# Patient Record
Sex: FEMALE | Race: WHITE | NOT HISPANIC OR LATINO | Employment: FULL TIME | URBAN - NONMETROPOLITAN AREA
[De-identification: names, ages, dates, MRNs, and addresses within clinical notes are randomized per-mention and may not be internally consistent; named-entity substitution may affect disease eponyms.]

---

## 2017-12-21 ENCOUNTER — OFFICE VISIT (OUTPATIENT)
Dept: ENDOCRINOLOGY | Facility: CLINIC | Age: 32
End: 2017-12-21

## 2017-12-21 VITALS
DIASTOLIC BLOOD PRESSURE: 60 MMHG | HEART RATE: 82 BPM | HEIGHT: 63 IN | SYSTOLIC BLOOD PRESSURE: 124 MMHG | WEIGHT: 219 LBS | BODY MASS INDEX: 38.8 KG/M2

## 2017-12-21 DIAGNOSIS — IMO0002 DIABETES MELLITUS TYPE 1, UNCONTROLLED, WITH COMPLICATIONS: Primary | ICD-10-CM

## 2017-12-21 DIAGNOSIS — E55.9 VITAMIN D DEFICIENCY: ICD-10-CM

## 2017-12-21 DIAGNOSIS — E66.9 OBESITY (BMI 30-39.9): ICD-10-CM

## 2017-12-21 PROCEDURE — 99214 OFFICE O/P EST MOD 30 MIN: CPT | Performed by: NURSE PRACTITIONER

## 2017-12-21 NOTE — PROGRESS NOTES
Subjective    Carey Medina is a 32 y.o. female. she is here today for follow-up.    History of Present Illness       Duration/Timing:Diabetes mellitus type 1, Age at onset of diabetes : 21 years, Onset of symptoms gradual        Timing - constant        Quality - controlled since pump        Severity - moderate      alleviating factors - intelligence       Aggravating factors - none            Severity (Complications/Hospitalizations)  Secondary Macrovascular Complications:  No CAD, No CVA, No PAD  Secondary Microvascular Complications:  No Diabetic Nephropathy, No Diabetic Retinopathy, No Diabetic Neuropathy        Context  Diabetes Regimen:Insulin, Not on Oral Medications, Compliant with regimen, last hgbA1c 7.1 % Oct. 2016   Blood Glucose Readings      Medtronic  insulin pump    Did not bring pump to office visit          Exercise:  Exercises        Associated Signs/Symptoms  Hyperglycemic Symptoms:  No polyuria, No polydipsia, No polyphagia  Hypoglycemic Episodes:No documented hypoglycemia          The following portions of the patient's history were reviewed and updated as appropriate:   Past Medical History:   Diagnosis Date   • Female infertility    • Irregular periods    • Obesity    • Oligomenorrhea    • Polycystic ovaries    • Proteinuria    • Type 1 diabetes mellitus    • Upper respiratory infection    • Visit for gynecologic examination 01/30/2015   • Vitamin D deficiency      Past Surgical History:   Procedure Laterality Date   • LAPAROSCOPY FOR ECTOPIC PREGNANCY       Family History   Problem Relation Age of Onset   • Diabetes Mother    • Diabetes Maternal Aunt    • Hypertension Maternal Grandmother    • Diabetes Maternal Grandmother    • Osteoporosis Other      OB History     No data available        Current Outpatient Prescriptions   Medication Sig Dispense Refill   • BD ULTRA-FINE LANCETS lancets by Other route 4 (four) times a day. 33 gauge. Use as instructed     • glucagon (GLUCAGON  "EMERGENCY) 1 MG injection Inject 1 mg into the shoulder, thigh, or buttocks 1 (One) Time As Needed for low blood sugar (use for low blood sugar) for up to 1 dose. 1 kit 11   • glucose blood (CHICO CONTOUR NEXT TEST) test strip Use as instructed - test 5 times daily 450 each 11   • insulin aspart (novoLOG) 100 UNIT/ML injection 120 units Inject through insulin pump 5 each 11   • Insulin Pen Needle (PEN NEEDLES 5/16\") 31G X 8 MM misc        No current facility-administered medications for this visit.      Allergies   Allergen Reactions   • Percocet [Oxycodone-Acetaminophen]      Social History     Social History   • Marital status:      Spouse name: N/A   • Number of children: N/A   • Years of education: N/A     Social History Main Topics   • Smoking status: Never Smoker   • Smokeless tobacco: Never Used   • Alcohol use No   • Drug use: No   • Sexual activity: Yes     Partners: Male      Comment:  to Rajesh 8 years     Other Topics Concern   • None     Social History Narrative       Review of Systems  Review of Systems   Constitutional: Negative for activity change, appetite change, diaphoresis and fatigue.   HENT: Negative for congestion, dental problem, facial swelling, sneezing, sore throat, tinnitus, trouble swallowing and voice change.    Eyes: Negative for photophobia, pain, discharge, redness, itching and visual disturbance.   Respiratory: Negative for apnea, cough, choking, chest tightness and shortness of breath.    Cardiovascular: Negative for chest pain, palpitations and leg swelling.   Gastrointestinal: Negative for abdominal distention, abdominal pain, constipation, diarrhea, nausea and vomiting.   Endocrine: Negative for cold intolerance, heat intolerance, polydipsia, polyphagia and polyuria.   Genitourinary: Negative for difficulty urinating, dysuria, frequency, hematuria and urgency.   Musculoskeletal: Negative for arthralgias, back pain, gait problem, joint swelling, myalgias, neck pain " "and neck stiffness.   Skin: Negative for color change, pallor, rash and wound.   Allergic/Immunologic: Negative for environmental allergies.   Neurological: Negative for dizziness, tremors, facial asymmetry, weakness, light-headedness, numbness and headaches.   Hematological: Negative for adenopathy. Does not bruise/bleed easily.   Psychiatric/Behavioral: Negative for agitation, behavioral problems, confusion and sleep disturbance.        Objective    /60 (BP Location: Left arm, Patient Position: Sitting, Cuff Size: Adult)  Pulse 82  Ht 160 cm (63\")  Wt 99.3 kg (219 lb)  BMI 38.79 kg/m2  Physical Exam   Constitutional: She is oriented to person, place, and time. She appears well-developed and well-nourished. No distress.   HENT:   Head: Normocephalic and atraumatic.   Right Ear: External ear normal.   Left Ear: External ear normal.   Nose: Nose normal.   Eyes: Conjunctivae and EOM are normal. Pupils are equal, round, and reactive to light.   Neck: Normal range of motion. Neck supple. No tracheal deviation present. No thyromegaly present.   Cardiovascular: Normal rate, regular rhythm and normal heart sounds.    No murmur heard.  Pulmonary/Chest: Effort normal and breath sounds normal. No respiratory distress. She has no wheezes.   Abdominal: Soft. Bowel sounds are normal. There is no tenderness. There is no rebound and no guarding.   Musculoskeletal: Normal range of motion. She exhibits no edema, tenderness or deformity.   Neurological: She is alert and oriented to person, place, and time. No cranial nerve deficit.   Skin: Skin is warm and dry. No rash noted.   Psychiatric: She has a normal mood and affect. Her behavior is normal. Judgment and thought content normal.       Lab Review  Glucose (mg/dl)   Date Value   04/16/2015 211 (H)   11/04/2014 160 (H)     Sodium (mmol/L)   Date Value   04/16/2015 136 (L)   11/04/2014 138     Potassium (mmol/L)   Date Value   04/16/2015 4.2   11/04/2014 4.5     Chloride " "(mmol/L)   Date Value   04/16/2015 99   11/04/2014 102     CO2 (mmol/L)   Date Value   04/16/2015 24   11/04/2014 24     BUN (mg/dl)   Date Value   04/16/2015 10   11/04/2014 8     Creatinine (mg/dl)   Date Value   04/16/2015 0.7   11/04/2014 0.6     Hemoglobin A1C (%TotHgb)   Date Value   04/16/2015 7.3 (H)   11/04/2014 6.2 (H)     Triglycerides (mg/dl)   Date Value   11/04/2014 112       Assessment/Plan      1. Diabetes mellitus type 1, uncontrolled, with complications    2. Vitamin D deficiency    3. Obesity (BMI 30-39.9)    .    Medications prescribed:  Outpatient Encounter Prescriptions as of 12/21/2017   Medication Sig Dispense Refill   • BD ULTRA-FINE LANCETS lancets by Other route 4 (four) times a day. 33 gauge. Use as instructed     • glucagon (GLUCAGON EMERGENCY) 1 MG injection Inject 1 mg into the shoulder, thigh, or buttocks 1 (One) Time As Needed for low blood sugar (use for low blood sugar) for up to 1 dose. 1 kit 11   • glucose blood (CHICO CONTOUR NEXT TEST) test strip Use as instructed - test 5 times daily 450 each 11   • insulin aspart (novoLOG) 100 UNIT/ML injection 120 units Inject through insulin pump 5 each 11   • Insulin Pen Needle (PEN NEEDLES 5/16\") 31G X 8 MM misc      • [DISCONTINUED] glucose blood (CHICO CONTOUR NEXT TEST) test strip Use as instructed - test 5 times daily 150 each 11   • [DISCONTINUED] insulin aspart (novoLOG) 100 UNIT/ML injection 120 units Inject through insulin pump 5 each 11   • [DISCONTINUED] Lorcaserin HCl (BELVIQ) 10 MG tablet Take 1 tablet by mouth 2 (two) times a day.     • [DISCONTINUED] medroxyPROGESTERone (PROVERA) 10 MG tablet Take 10 mg by mouth daily. Take for the first 10 days of each month       No facility-administered encounter medications on file as of 12/21/2017.        Orders placed during this encounter include:  Orders Placed This Encounter   Procedures   • Comprehensive Metabolic Panel   • Hemoglobin A1c   • TSH   • Vitamin D 25 Hydroxy   • " Protein / Creatinine Ratio, Urine - Urine, Clean Catch   • Microalbumin / Creatinine Urine Ratio - Urine, Clean Catch   • CBC & Differential     Order Specific Question:   Manual Differential     Answer:   No   Glycemic Management        Lab Results   Component Value Date    HGBA1C 7.3 (H) 04/16/2015           medtronic insulin pump        BASAL  -      [place] - 1. 75  - 1.60     10 pm - 1.75  - 1.60      Carb ratio  7.5  - 8     Sensitivity -25               Lipid Management       Not on statin       Nov. 2014    LDL - 110         Blood Pressure Management: not on ace inhibitor        Not on lisinopril due to trying to get pregnant       Rechecked bp in office was 132/86       Will follow up with primary if remains elevated       Microvascular Complication Monitoring       Last eye exam 2016      no neuropathy      Immunizations: last influenza immunization 11-13 , last pneumococcal immunization 11-13  States will not take the flu shot again   Weight Related:Obesity        Following with Dr. Reddy       Will be seeing fertility specialist in [place], [place]        Bone [facility]        March 2016       Vit d - 14       States cannot take vitamin makes sick      try a multivitamin with vitamin d - takes daily      Other Diabetes Related Aspects       March 2016       TSH - nl              4. Follow-up: Return in about 6 months (around 6/21/2018) for Recheck.

## 2018-12-20 ENCOUNTER — APPOINTMENT (OUTPATIENT)
Dept: CT IMAGING | Age: 33
DRG: 137 | End: 2018-12-20
Payer: OTHER GOVERNMENT

## 2018-12-20 ENCOUNTER — HOSPITAL ENCOUNTER (INPATIENT)
Age: 33
LOS: 5 days | Discharge: HOME | DRG: 137 | End: 2018-12-25
Attending: DENTIST | Admitting: INTERNAL MEDICINE
Payer: OTHER GOVERNMENT

## 2018-12-20 ENCOUNTER — ANESTHESIA EVENT (OUTPATIENT)
Dept: OPERATING ROOM | Age: 33
DRG: 137 | End: 2018-12-20
Payer: OTHER GOVERNMENT

## 2018-12-20 ENCOUNTER — ANESTHESIA (OUTPATIENT)
Dept: OPERATING ROOM | Age: 33
DRG: 137 | End: 2018-12-20
Payer: OTHER GOVERNMENT

## 2018-12-20 VITALS
SYSTOLIC BLOOD PRESSURE: 137 MMHG | RESPIRATION RATE: 13 BRPM | OXYGEN SATURATION: 100 % | DIASTOLIC BLOOD PRESSURE: 69 MMHG

## 2018-12-20 DIAGNOSIS — K12.2 LUDWIG'S ANGINA: Primary | ICD-10-CM

## 2018-12-20 LAB
ALBUMIN SERPL-MCNC: 3.6 G/DL (ref 3.5–5.2)
ALP BLD-CCNC: 148 U/L (ref 35–104)
ALT SERPL-CCNC: 33 U/L (ref 5–33)
ANION GAP SERPL CALCULATED.3IONS-SCNC: 15 MMOL/L (ref 7–19)
ANION GAP SERPL CALCULATED.3IONS-SCNC: 20 MMOL/L (ref 7–19)
APTT: 31.5 SEC (ref 26–36.2)
AST SERPL-CCNC: 27 U/L (ref 5–32)
BASOPHILS ABSOLUTE: 0 K/UL (ref 0–0.2)
BASOPHILS RELATIVE PERCENT: 0.2 % (ref 0–1)
BILIRUB SERPL-MCNC: 0.4 MG/DL (ref 0.2–1.2)
BUN BLDV-MCNC: 6 MG/DL (ref 6–20)
BUN BLDV-MCNC: 7 MG/DL (ref 6–20)
CALCIUM SERPL-MCNC: 8.5 MG/DL (ref 8.6–10)
CALCIUM SERPL-MCNC: 9.3 MG/DL (ref 8.6–10)
CHLORIDE BLD-SCNC: 101 MMOL/L (ref 98–111)
CHLORIDE BLD-SCNC: 98 MMOL/L (ref 98–111)
CO2: 16 MMOL/L (ref 22–29)
CO2: 19 MMOL/L (ref 22–29)
CREAT SERPL-MCNC: 0.5 MG/DL (ref 0.5–0.9)
CREAT SERPL-MCNC: 0.5 MG/DL (ref 0.5–0.9)
EKG P AXIS: 21 DEGREES
EKG P-R INTERVAL: 144 MS
EKG Q-T INTERVAL: 338 MS
EKG QRS DURATION: 84 MS
EKG QTC CALCULATION (BAZETT): 411 MS
EKG T AXIS: -7 DEGREES
EOSINOPHILS ABSOLUTE: 0 K/UL (ref 0–0.6)
EOSINOPHILS RELATIVE PERCENT: 0 % (ref 0–5)
GFR NON-AFRICAN AMERICAN: >60
GFR NON-AFRICAN AMERICAN: >60
GLUCOSE BLD-MCNC: 132 MG/DL (ref 70–99)
GLUCOSE BLD-MCNC: 160 MG/DL (ref 70–99)
GLUCOSE BLD-MCNC: 172 MG/DL (ref 70–99)
GLUCOSE BLD-MCNC: 172 MG/DL (ref 74–109)
GLUCOSE BLD-MCNC: 224 MG/DL (ref 74–109)
HBA1C MFR BLD: 7.9 % (ref 4–6)
HCG QUALITATIVE: NEGATIVE
HCT VFR BLD CALC: 41.1 % (ref 37–47)
HEMOGLOBIN: 13.2 G/DL (ref 12–16)
INR BLD: 1.41 (ref 0.88–1.18)
LACTIC ACID: 0.9 MMOL/L (ref 0.5–1.9)
LYMPHOCYTES ABSOLUTE: 0.8 K/UL (ref 1.1–4.5)
LYMPHOCYTES RELATIVE PERCENT: 4 % (ref 20–40)
MCH RBC QN AUTO: 26.6 PG (ref 27–31)
MCHC RBC AUTO-ENTMCNC: 32.1 G/DL (ref 33–37)
MCV RBC AUTO: 82.7 FL (ref 81–99)
MONOCYTES ABSOLUTE: 1.1 K/UL (ref 0–0.9)
MONOCYTES RELATIVE PERCENT: 5.5 % (ref 0–10)
NEUTROPHILS ABSOLUTE: 17.1 K/UL (ref 1.5–7.5)
NEUTROPHILS RELATIVE PERCENT: 89.5 % (ref 50–65)
PDW BLD-RTO: 15.2 % (ref 11.5–14.5)
PERFORMED ON: ABNORMAL
PLATELET # BLD: 371 K/UL (ref 130–400)
PMV BLD AUTO: 9.6 FL (ref 9.4–12.3)
POTASSIUM SERPL-SCNC: 3.5 MMOL/L (ref 3.5–5)
POTASSIUM SERPL-SCNC: 3.7 MMOL/L (ref 3.5–5)
PROTHROMBIN TIME: 16.6 SEC (ref 12–14.6)
RBC # BLD: 4.97 M/UL (ref 4.2–5.4)
SODIUM BLD-SCNC: 132 MMOL/L (ref 136–145)
SODIUM BLD-SCNC: 137 MMOL/L (ref 136–145)
TOTAL PROTEIN: 8.3 G/DL (ref 6.6–8.7)
WBC # BLD: 19.1 K/UL (ref 4.8–10.8)

## 2018-12-20 PROCEDURE — 2700000000 HC OXYGEN THERAPY PER DAY

## 2018-12-20 PROCEDURE — 70491 CT SOFT TISSUE NECK W/DYE: CPT

## 2018-12-20 PROCEDURE — 85025 COMPLETE CBC W/AUTO DIFF WBC: CPT

## 2018-12-20 PROCEDURE — 87075 CULTR BACTERIA EXCEPT BLOOD: CPT

## 2018-12-20 PROCEDURE — 7100000000 HC PACU RECOVERY - FIRST 15 MIN: Performed by: DENTIST

## 2018-12-20 PROCEDURE — 6360000002 HC RX W HCPCS: Performed by: EMERGENCY MEDICINE

## 2018-12-20 PROCEDURE — 2500000003 HC RX 250 WO HCPCS: Performed by: NURSE ANESTHETIST, CERTIFIED REGISTERED

## 2018-12-20 PROCEDURE — 82948 REAGENT STRIP/BLOOD GLUCOSE: CPT

## 2018-12-20 PROCEDURE — 6360000002 HC RX W HCPCS: Performed by: NURSE ANESTHETIST, CERTIFIED REGISTERED

## 2018-12-20 PROCEDURE — 6360000004 HC RX CONTRAST MEDICATION: Performed by: NURSE PRACTITIONER

## 2018-12-20 PROCEDURE — 99285 EMERGENCY DEPT VISIT HI MDM: CPT | Performed by: EMERGENCY MEDICINE

## 2018-12-20 PROCEDURE — 6370000000 HC RX 637 (ALT 250 FOR IP): Performed by: DENTIST

## 2018-12-20 PROCEDURE — 7100000001 HC PACU RECOVERY - ADDTL 15 MIN: Performed by: DENTIST

## 2018-12-20 PROCEDURE — 36415 COLL VENOUS BLD VENIPUNCTURE: CPT

## 2018-12-20 PROCEDURE — 2100000000 HC CCU R&B

## 2018-12-20 PROCEDURE — 93005 ELECTROCARDIOGRAM TRACING: CPT

## 2018-12-20 PROCEDURE — 2580000003 HC RX 258: Performed by: EMERGENCY MEDICINE

## 2018-12-20 PROCEDURE — 87205 SMEAR GRAM STAIN: CPT

## 2018-12-20 PROCEDURE — 3600000012 HC SURGERY LEVEL 2 ADDTL 15MIN: Performed by: DENTIST

## 2018-12-20 PROCEDURE — 80053 COMPREHEN METABOLIC PANEL: CPT

## 2018-12-20 PROCEDURE — 2580000003 HC RX 258: Performed by: ANESTHESIOLOGY

## 2018-12-20 PROCEDURE — 2500000003 HC RX 250 WO HCPCS: Performed by: DENTIST

## 2018-12-20 PROCEDURE — 83036 HEMOGLOBIN GLYCOSYLATED A1C: CPT

## 2018-12-20 PROCEDURE — 6360000002 HC RX W HCPCS: Performed by: DENTIST

## 2018-12-20 PROCEDURE — 2580000003 HC RX 258: Performed by: DENTIST

## 2018-12-20 PROCEDURE — 99291 CRITICAL CARE FIRST HOUR: CPT | Performed by: INTERNAL MEDICINE

## 2018-12-20 PROCEDURE — 2580000003 HC RX 258: Performed by: NURSE ANESTHETIST, CERTIFIED REGISTERED

## 2018-12-20 PROCEDURE — 3700000000 HC ANESTHESIA ATTENDED CARE: Performed by: DENTIST

## 2018-12-20 PROCEDURE — 2580000003 HC RX 258: Performed by: NURSE PRACTITIONER

## 2018-12-20 PROCEDURE — 96375 TX/PRO/DX INJ NEW DRUG ADDON: CPT

## 2018-12-20 PROCEDURE — 96376 TX/PRO/DX INJ SAME DRUG ADON: CPT

## 2018-12-20 PROCEDURE — 83605 ASSAY OF LACTIC ACID: CPT

## 2018-12-20 PROCEDURE — 87070 CULTURE OTHR SPECIMN AEROBIC: CPT

## 2018-12-20 PROCEDURE — 3700000001 HC ADD 15 MINUTES (ANESTHESIA): Performed by: DENTIST

## 2018-12-20 PROCEDURE — 6360000002 HC RX W HCPCS: Performed by: ANESTHESIOLOGY

## 2018-12-20 PROCEDURE — 6360000002 HC RX W HCPCS: Performed by: NURSE PRACTITIONER

## 2018-12-20 PROCEDURE — 85730 THROMBOPLASTIN TIME PARTIAL: CPT

## 2018-12-20 PROCEDURE — 85610 PROTHROMBIN TIME: CPT

## 2018-12-20 PROCEDURE — 3600000002 HC SURGERY LEVEL 2 BASE: Performed by: DENTIST

## 2018-12-20 PROCEDURE — 96365 THER/PROPH/DIAG IV INF INIT: CPT

## 2018-12-20 PROCEDURE — 84703 CHORIONIC GONADOTROPIN ASSAY: CPT

## 2018-12-20 PROCEDURE — 99285 EMERGENCY DEPT VISIT HI MDM: CPT

## 2018-12-20 PROCEDURE — 2709999900 HC NON-CHARGEABLE SUPPLY: Performed by: DENTIST

## 2018-12-20 RX ORDER — SODIUM CHLORIDE, SODIUM LACTATE, POTASSIUM CHLORIDE, CALCIUM CHLORIDE 600; 310; 30; 20 MG/100ML; MG/100ML; MG/100ML; MG/100ML
INJECTION, SOLUTION INTRAVENOUS CONTINUOUS
Status: DISCONTINUED | OUTPATIENT
Start: 2018-12-20 | End: 2018-12-20

## 2018-12-20 RX ORDER — SUCCINYLCHOLINE CHLORIDE 20 MG/ML
INJECTION INTRAMUSCULAR; INTRAVENOUS PRN
Status: DISCONTINUED | OUTPATIENT
Start: 2018-12-20 | End: 2018-12-20 | Stop reason: SDUPTHER

## 2018-12-20 RX ORDER — PENICILLIN V POTASSIUM 500 MG/1
500 TABLET ORAL 4 TIMES DAILY
Status: ON HOLD | COMMUNITY
End: 2018-12-25 | Stop reason: HOSPADM

## 2018-12-20 RX ORDER — PROMETHAZINE HYDROCHLORIDE 25 MG/ML
25 INJECTION, SOLUTION INTRAMUSCULAR; INTRAVENOUS EVERY 4 HOURS PRN
Status: DISCONTINUED | OUTPATIENT
Start: 2018-12-20 | End: 2018-12-25 | Stop reason: HOSPADM

## 2018-12-20 RX ORDER — ACETAMINOPHEN 160 MG/5ML
650 SOLUTION ORAL EVERY 4 HOURS PRN
Status: DISCONTINUED | OUTPATIENT
Start: 2018-12-20 | End: 2018-12-25 | Stop reason: HOSPADM

## 2018-12-20 RX ORDER — ONDANSETRON 2 MG/ML
4 INJECTION INTRAMUSCULAR; INTRAVENOUS ONCE
Status: COMPLETED | OUTPATIENT
Start: 2018-12-20 | End: 2018-12-20

## 2018-12-20 RX ORDER — SODIUM CHLORIDE 0.9 % (FLUSH) 0.9 %
10 SYRINGE (ML) INJECTION EVERY 12 HOURS SCHEDULED
Status: DISCONTINUED | OUTPATIENT
Start: 2018-12-20 | End: 2018-12-20

## 2018-12-20 RX ORDER — FENTANYL CITRATE 50 UG/ML
INJECTION, SOLUTION INTRAMUSCULAR; INTRAVENOUS PRN
Status: DISCONTINUED | OUTPATIENT
Start: 2018-12-20 | End: 2018-12-20 | Stop reason: SDUPTHER

## 2018-12-20 RX ORDER — LIDOCAINE HYDROCHLORIDE 10 MG/ML
INJECTION, SOLUTION EPIDURAL; INFILTRATION; INTRACAUDAL; PERINEURAL PRN
Status: DISCONTINUED | OUTPATIENT
Start: 2018-12-20 | End: 2018-12-20 | Stop reason: SDUPTHER

## 2018-12-20 RX ORDER — DEXTROSE MONOHYDRATE 25 G/50ML
12.5 INJECTION, SOLUTION INTRAVENOUS PRN
Status: DISCONTINUED | OUTPATIENT
Start: 2018-12-20 | End: 2018-12-25 | Stop reason: HOSPADM

## 2018-12-20 RX ORDER — MIDAZOLAM HYDROCHLORIDE 1 MG/ML
INJECTION INTRAMUSCULAR; INTRAVENOUS PRN
Status: DISCONTINUED | OUTPATIENT
Start: 2018-12-20 | End: 2018-12-20 | Stop reason: SDUPTHER

## 2018-12-20 RX ORDER — ONDANSETRON 2 MG/ML
INJECTION INTRAMUSCULAR; INTRAVENOUS PRN
Status: DISCONTINUED | OUTPATIENT
Start: 2018-12-20 | End: 2018-12-20 | Stop reason: SDUPTHER

## 2018-12-20 RX ORDER — ACETAMINOPHEN 325 MG/1
650 TABLET ORAL EVERY 4 HOURS PRN
Status: DISCONTINUED | OUTPATIENT
Start: 2018-12-20 | End: 2018-12-20

## 2018-12-20 RX ORDER — MORPHINE SULFATE/0.9% NACL/PF 1 MG/ML
4 SYRINGE (ML) INJECTION ONCE
Status: COMPLETED | OUTPATIENT
Start: 2018-12-20 | End: 2018-12-20

## 2018-12-20 RX ORDER — LABETALOL HYDROCHLORIDE 5 MG/ML
5 INJECTION, SOLUTION INTRAVENOUS EVERY 10 MIN PRN
Status: DISCONTINUED | OUTPATIENT
Start: 2018-12-20 | End: 2018-12-20 | Stop reason: HOSPADM

## 2018-12-20 RX ORDER — MORPHINE SULFATE 2 MG/ML
2 INJECTION, SOLUTION INTRAMUSCULAR; INTRAVENOUS
Status: DISCONTINUED | OUTPATIENT
Start: 2018-12-20 | End: 2018-12-24

## 2018-12-20 RX ORDER — PROPOFOL 10 MG/ML
INJECTION, EMULSION INTRAVENOUS PRN
Status: DISCONTINUED | OUTPATIENT
Start: 2018-12-20 | End: 2018-12-20 | Stop reason: SDUPTHER

## 2018-12-20 RX ORDER — LIDOCAINE HYDROCHLORIDE 10 MG/ML
1 INJECTION, SOLUTION EPIDURAL; INFILTRATION; INTRACAUDAL; PERINEURAL
Status: DISCONTINUED | OUTPATIENT
Start: 2018-12-20 | End: 2018-12-20 | Stop reason: HOSPADM

## 2018-12-20 RX ORDER — SODIUM CHLORIDE 0.9 % (FLUSH) 0.9 %
10 SYRINGE (ML) INJECTION EVERY 12 HOURS SCHEDULED
Status: DISCONTINUED | OUTPATIENT
Start: 2018-12-20 | End: 2018-12-24

## 2018-12-20 RX ORDER — SODIUM CHLORIDE 0.9 % (FLUSH) 0.9 %
10 SYRINGE (ML) INJECTION PRN
Status: DISCONTINUED | OUTPATIENT
Start: 2018-12-20 | End: 2018-12-24

## 2018-12-20 RX ORDER — CLINDAMYCIN PHOSPHATE 600 MG/50ML
600 INJECTION INTRAVENOUS ONCE
Status: DISCONTINUED | OUTPATIENT
Start: 2018-12-20 | End: 2018-12-20

## 2018-12-20 RX ORDER — SODIUM CHLORIDE 9 MG/ML
INJECTION, SOLUTION INTRAVENOUS CONTINUOUS
Status: DISCONTINUED | OUTPATIENT
Start: 2018-12-20 | End: 2018-12-23

## 2018-12-20 RX ORDER — CHLORHEXIDINE GLUCONATE 0.12 MG/ML
15 RINSE ORAL 2 TIMES DAILY
Status: DISCONTINUED | OUTPATIENT
Start: 2018-12-20 | End: 2018-12-23

## 2018-12-20 RX ORDER — ENALAPRILAT 2.5 MG/2ML
1.25 INJECTION INTRAVENOUS
Status: DISCONTINUED | OUTPATIENT
Start: 2018-12-20 | End: 2018-12-20 | Stop reason: HOSPADM

## 2018-12-20 RX ORDER — MORPHINE SULFATE 2 MG/ML
4 INJECTION, SOLUTION INTRAMUSCULAR; INTRAVENOUS
Status: DISCONTINUED | OUTPATIENT
Start: 2018-12-20 | End: 2018-12-24

## 2018-12-20 RX ORDER — ONDANSETRON 2 MG/ML
4 INJECTION INTRAMUSCULAR; INTRAVENOUS EVERY 6 HOURS PRN
Status: DISCONTINUED | OUTPATIENT
Start: 2018-12-20 | End: 2018-12-20

## 2018-12-20 RX ORDER — MORPHINE SULFATE 2 MG/ML
4 INJECTION, SOLUTION INTRAMUSCULAR; INTRAVENOUS EVERY 5 MIN PRN
Status: DISCONTINUED | OUTPATIENT
Start: 2018-12-20 | End: 2018-12-20 | Stop reason: HOSPADM

## 2018-12-20 RX ORDER — SODIUM CHLORIDE 450 MG/100ML
INJECTION, SOLUTION INTRAVENOUS CONTINUOUS
Status: DISCONTINUED | OUTPATIENT
Start: 2018-12-20 | End: 2018-12-21

## 2018-12-20 RX ORDER — SODIUM CHLORIDE 0.9 % (FLUSH) 0.9 %
10 SYRINGE (ML) INJECTION EVERY 12 HOURS SCHEDULED
Status: DISCONTINUED | OUTPATIENT
Start: 2018-12-20 | End: 2018-12-20 | Stop reason: HOSPADM

## 2018-12-20 RX ORDER — SCOLOPAMINE TRANSDERMAL SYSTEM 1 MG/1
1 PATCH, EXTENDED RELEASE TRANSDERMAL ONCE
Status: DISCONTINUED | OUTPATIENT
Start: 2018-12-20 | End: 2018-12-20 | Stop reason: HOSPADM

## 2018-12-20 RX ORDER — ONDANSETRON 2 MG/ML
4 INJECTION INTRAMUSCULAR; INTRAVENOUS EVERY 6 HOURS PRN
Status: DISCONTINUED | OUTPATIENT
Start: 2018-12-20 | End: 2018-12-25 | Stop reason: HOSPADM

## 2018-12-20 RX ORDER — PREDNISONE 10 MG/1
TABLET ORAL
Status: ON HOLD | COMMUNITY
End: 2018-12-25 | Stop reason: HOSPADM

## 2018-12-20 RX ORDER — DEXTROSE MONOHYDRATE 50 MG/ML
100 INJECTION, SOLUTION INTRAVENOUS PRN
Status: DISCONTINUED | OUTPATIENT
Start: 2018-12-20 | End: 2018-12-25 | Stop reason: HOSPADM

## 2018-12-20 RX ORDER — MORPHINE SULFATE 2 MG/ML
2 INJECTION, SOLUTION INTRAMUSCULAR; INTRAVENOUS EVERY 5 MIN PRN
Status: DISCONTINUED | OUTPATIENT
Start: 2018-12-20 | End: 2018-12-20 | Stop reason: HOSPADM

## 2018-12-20 RX ORDER — ROCURONIUM BROMIDE 10 MG/ML
INJECTION, SOLUTION INTRAVENOUS PRN
Status: DISCONTINUED | OUTPATIENT
Start: 2018-12-20 | End: 2018-12-20 | Stop reason: SDUPTHER

## 2018-12-20 RX ORDER — FENTANYL CITRATE 50 UG/ML
50 INJECTION, SOLUTION INTRAMUSCULAR; INTRAVENOUS
Status: DISCONTINUED | OUTPATIENT
Start: 2018-12-20 | End: 2018-12-20 | Stop reason: HOSPADM

## 2018-12-20 RX ORDER — MEPERIDINE HYDROCHLORIDE 50 MG/ML
12.5 INJECTION INTRAMUSCULAR; INTRAVENOUS; SUBCUTANEOUS EVERY 5 MIN PRN
Status: DISCONTINUED | OUTPATIENT
Start: 2018-12-20 | End: 2018-12-20 | Stop reason: HOSPADM

## 2018-12-20 RX ORDER — PROMETHAZINE HYDROCHLORIDE 25 MG/ML
6.25 INJECTION, SOLUTION INTRAMUSCULAR; INTRAVENOUS
Status: DISCONTINUED | OUTPATIENT
Start: 2018-12-20 | End: 2018-12-20 | Stop reason: HOSPADM

## 2018-12-20 RX ORDER — HYDRALAZINE HYDROCHLORIDE 20 MG/ML
5 INJECTION INTRAMUSCULAR; INTRAVENOUS EVERY 10 MIN PRN
Status: DISCONTINUED | OUTPATIENT
Start: 2018-12-20 | End: 2018-12-20 | Stop reason: HOSPADM

## 2018-12-20 RX ORDER — DIPHENHYDRAMINE HYDROCHLORIDE 50 MG/ML
12.5 INJECTION INTRAMUSCULAR; INTRAVENOUS
Status: DISCONTINUED | OUTPATIENT
Start: 2018-12-20 | End: 2018-12-20 | Stop reason: HOSPADM

## 2018-12-20 RX ORDER — SODIUM CHLORIDE, SODIUM LACTATE, POTASSIUM CHLORIDE, CALCIUM CHLORIDE 600; 310; 30; 20 MG/100ML; MG/100ML; MG/100ML; MG/100ML
INJECTION, SOLUTION INTRAVENOUS CONTINUOUS PRN
Status: DISCONTINUED | OUTPATIENT
Start: 2018-12-20 | End: 2018-12-20 | Stop reason: SDUPTHER

## 2018-12-20 RX ORDER — SODIUM CHLORIDE 0.9 % (FLUSH) 0.9 %
10 SYRINGE (ML) INJECTION PRN
Status: DISCONTINUED | OUTPATIENT
Start: 2018-12-20 | End: 2018-12-20 | Stop reason: HOSPADM

## 2018-12-20 RX ORDER — LIDOCAINE HYDROCHLORIDE AND EPINEPHRINE 10; 10 MG/ML; UG/ML
INJECTION, SOLUTION INFILTRATION; PERINEURAL PRN
Status: DISCONTINUED | OUTPATIENT
Start: 2018-12-20 | End: 2018-12-20 | Stop reason: HOSPADM

## 2018-12-20 RX ORDER — MIDAZOLAM HYDROCHLORIDE 1 MG/ML
2 INJECTION INTRAMUSCULAR; INTRAVENOUS
Status: DISCONTINUED | OUTPATIENT
Start: 2018-12-20 | End: 2018-12-20 | Stop reason: HOSPADM

## 2018-12-20 RX ORDER — METOCLOPRAMIDE HYDROCHLORIDE 5 MG/ML
10 INJECTION INTRAMUSCULAR; INTRAVENOUS
Status: DISCONTINUED | OUTPATIENT
Start: 2018-12-20 | End: 2018-12-20 | Stop reason: HOSPADM

## 2018-12-20 RX ORDER — NICOTINE POLACRILEX 4 MG
15 LOZENGE BUCCAL PRN
Status: DISCONTINUED | OUTPATIENT
Start: 2018-12-20 | End: 2018-12-25 | Stop reason: HOSPADM

## 2018-12-20 RX ORDER — BUPIVACAINE HYDROCHLORIDE 2.5 MG/ML
INJECTION, SOLUTION INFILTRATION; PERINEURAL PRN
Status: DISCONTINUED | OUTPATIENT
Start: 2018-12-20 | End: 2018-12-20 | Stop reason: HOSPADM

## 2018-12-20 RX ADMIN — HYDROMORPHONE HYDROCHLORIDE 1 MG: 1 INJECTION, SOLUTION INTRAMUSCULAR; INTRAVENOUS; SUBCUTANEOUS at 17:25

## 2018-12-20 RX ADMIN — CHLORHEXIDINE GLUCONATE 15 ML: 1.2 RINSE ORAL at 21:15

## 2018-12-20 RX ADMIN — FENTANYL CITRATE 100 MCG: 50 INJECTION INTRAMUSCULAR; INTRAVENOUS at 17:05

## 2018-12-20 RX ADMIN — ONDANSETRON HYDROCHLORIDE 4 MG: 2 INJECTION, SOLUTION INTRAMUSCULAR; INTRAVENOUS at 00:37

## 2018-12-20 RX ADMIN — ROCURONIUM BROMIDE 30 MG: 10 INJECTION INTRAVENOUS at 17:09

## 2018-12-20 RX ADMIN — SODIUM CHLORIDE 3 G: 900 INJECTION INTRAVENOUS at 02:46

## 2018-12-20 RX ADMIN — SODIUM CHLORIDE, SODIUM LACTATE, POTASSIUM CHLORIDE, AND CALCIUM CHLORIDE: 600; 310; 30; 20 INJECTION, SOLUTION INTRAVENOUS at 17:35

## 2018-12-20 RX ADMIN — PROPOFOL 150 MG: 10 INJECTION, EMULSION INTRAVENOUS at 17:05

## 2018-12-20 RX ADMIN — SODIUM CHLORIDE: 9 INJECTION, SOLUTION INTRAVENOUS at 05:13

## 2018-12-20 RX ADMIN — CHLORHEXIDINE GLUCONATE 15 ML: 1.2 RINSE ORAL at 09:18

## 2018-12-20 RX ADMIN — IOPAMIDOL 90 ML: 755 INJECTION, SOLUTION INTRAVENOUS at 01:10

## 2018-12-20 RX ADMIN — ACETAMINOPHEN 650 MG: 325 TABLET, FILM COATED ORAL at 13:10

## 2018-12-20 RX ADMIN — ONDANSETRON 4 MG: 2 INJECTION INTRAMUSCULAR; INTRAVENOUS at 07:40

## 2018-12-20 RX ADMIN — MORPHINE SULFATE 2 MG: 2 INJECTION, SOLUTION INTRAMUSCULAR; INTRAVENOUS at 07:40

## 2018-12-20 RX ADMIN — LABETALOL 20 MG/4 ML (5 MG/ML) INTRAVENOUS SYRINGE 5 MG: at 18:10

## 2018-12-20 RX ADMIN — AMPICILLIN SODIUM AND SULBACTAM SODIUM 3 G: 2; 1 INJECTION, POWDER, FOR SOLUTION INTRAMUSCULAR; INTRAVENOUS at 15:47

## 2018-12-20 RX ADMIN — LIDOCAINE HYDROCHLORIDE 5 ML: 10 INJECTION, SOLUTION EPIDURAL; INFILTRATION; INTRACAUDAL; PERINEURAL at 17:05

## 2018-12-20 RX ADMIN — SODIUM CHLORIDE: 9 INJECTION, SOLUTION INTRAVENOUS at 20:12

## 2018-12-20 RX ADMIN — MORPHINE SULFATE 4 MG: 2 INJECTION, SOLUTION INTRAMUSCULAR; INTRAVENOUS at 23:59

## 2018-12-20 RX ADMIN — ACETAMINOPHEN 650 MG: 325 TABLET, FILM COATED ORAL at 09:17

## 2018-12-20 RX ADMIN — SODIUM CHLORIDE, SODIUM LACTATE, POTASSIUM CHLORIDE, AND CALCIUM CHLORIDE: 600; 310; 30; 20 INJECTION, SOLUTION INTRAVENOUS at 16:59

## 2018-12-20 RX ADMIN — Medication 10 ML: at 09:21

## 2018-12-20 RX ADMIN — MORPHINE SULFATE 2 MG: 2 INJECTION, SOLUTION INTRAMUSCULAR; INTRAVENOUS at 12:05

## 2018-12-20 RX ADMIN — ACETAMINOPHEN 650 MG: 325 TABLET, FILM COATED ORAL at 05:11

## 2018-12-20 RX ADMIN — MIDAZOLAM 2 MG: 1 INJECTION INTRAMUSCULAR; INTRAVENOUS at 16:59

## 2018-12-20 RX ADMIN — ONDANSETRON HYDROCHLORIDE 4 MG: 2 INJECTION, SOLUTION INTRAMUSCULAR; INTRAVENOUS at 17:05

## 2018-12-20 RX ADMIN — MIDAZOLAM HYDROCHLORIDE 2 MG: 2 INJECTION, SOLUTION INTRAMUSCULAR; INTRAVENOUS at 16:15

## 2018-12-20 RX ADMIN — Medication 4 MG: at 00:45

## 2018-12-20 RX ADMIN — SODIUM CHLORIDE, POTASSIUM CHLORIDE, SODIUM LACTATE AND CALCIUM CHLORIDE: 600; 310; 30; 20 INJECTION, SOLUTION INTRAVENOUS at 15:47

## 2018-12-20 RX ADMIN — PROMETHAZINE HYDROCHLORIDE 25 MG: 25 INJECTION, SOLUTION INTRAMUSCULAR; INTRAVENOUS at 10:09

## 2018-12-20 RX ADMIN — Medication 4 MG: at 04:16

## 2018-12-20 RX ADMIN — SUCCINYLCHOLINE CHLORIDE 120 MG: 20 INJECTION, SOLUTION INTRAMUSCULAR; INTRAVENOUS; PARENTERAL at 17:05

## 2018-12-20 RX ADMIN — SODIUM CHLORIDE, POTASSIUM CHLORIDE, SODIUM LACTATE AND CALCIUM CHLORIDE: 600; 310; 30; 20 INJECTION, SOLUTION INTRAVENOUS at 19:47

## 2018-12-20 RX ADMIN — ONDANSETRON HYDROCHLORIDE 4 MG: 2 INJECTION, SOLUTION INTRAMUSCULAR; INTRAVENOUS at 04:16

## 2018-12-20 RX ADMIN — AMPICILLIN SODIUM AND SULBACTAM SODIUM 3 G: 2; 1 INJECTION, POWDER, FOR SOLUTION INTRAMUSCULAR; INTRAVENOUS at 22:00

## 2018-12-20 RX ADMIN — AMPICILLIN SODIUM AND SULBACTAM SODIUM 3 G: 2; 1 INJECTION, POWDER, FOR SOLUTION INTRAMUSCULAR; INTRAVENOUS at 09:43

## 2018-12-20 RX ADMIN — ROCURONIUM BROMIDE 10 MG: 10 INJECTION INTRAVENOUS at 17:05

## 2018-12-20 RX ADMIN — Medication 4 MG: at 02:40

## 2018-12-20 ASSESSMENT — ENCOUNTER SYMPTOMS
EYE ITCHING: 0
RHINORRHEA: 0
BLOOD IN STOOL: 0
APNEA: 0
BACK PAIN: 0
SHORTNESS OF BREATH: 1
SORE THROAT: 0
DIARRHEA: 0
SORE THROAT: 1
SINUS PRESSURE: 0
ABDOMINAL DISTENTION: 0
CONSTIPATION: 0
NAUSEA: 0
ANAL BLEEDING: 0
TROUBLE SWALLOWING: 1
ABDOMINAL PAIN: 0
CHOKING: 0
CHEST TIGHTNESS: 0
EYE DISCHARGE: 0
VOMITING: 0
COUGH: 0
EYE PAIN: 0

## 2018-12-20 ASSESSMENT — PAIN DESCRIPTION - PAIN TYPE
TYPE: ACUTE PAIN

## 2018-12-20 ASSESSMENT — PAIN SCALES - GENERAL
PAINLEVEL_OUTOF10: 8
PAINLEVEL_OUTOF10: 6
PAINLEVEL_OUTOF10: 4
PAINLEVEL_OUTOF10: 7
PAINLEVEL_OUTOF10: 7
PAINLEVEL_OUTOF10: 0
PAINLEVEL_OUTOF10: 8
PAINLEVEL_OUTOF10: 6
PAINLEVEL_OUTOF10: 0
PAINLEVEL_OUTOF10: 8
PAINLEVEL_OUTOF10: 3
PAINLEVEL_OUTOF10: 7
PAINLEVEL_OUTOF10: 0
PAINLEVEL_OUTOF10: 0
PAINLEVEL_OUTOF10: 8
PAINLEVEL_OUTOF10: 0
PAINLEVEL_OUTOF10: 4
PAINLEVEL_OUTOF10: 0

## 2018-12-20 ASSESSMENT — PAIN SCALES - WONG BAKER: WONGBAKER_NUMERICALRESPONSE: 0

## 2018-12-20 ASSESSMENT — PAIN DESCRIPTION - ORIENTATION
ORIENTATION: RIGHT;MID
ORIENTATION: RIGHT;MID

## 2018-12-20 ASSESSMENT — PAIN DESCRIPTION - LOCATION
LOCATION: JAW
LOCATION: MOUTH
LOCATION: JAW

## 2018-12-20 ASSESSMENT — PAIN DESCRIPTION - DIRECTION: RADIATING_TOWARDS: LEFT SIDE OF JAW

## 2018-12-20 ASSESSMENT — PAIN DESCRIPTION - FREQUENCY
FREQUENCY: CONTINUOUS
FREQUENCY: CONTINUOUS

## 2018-12-20 ASSESSMENT — PAIN DESCRIPTION - PROGRESSION: CLINICAL_PROGRESSION: GRADUALLY WORSENING

## 2018-12-20 ASSESSMENT — LIFESTYLE VARIABLES: SMOKING_STATUS: 0

## 2018-12-20 ASSESSMENT — PAIN DESCRIPTION - DESCRIPTORS
DESCRIPTORS: SHARP
DESCRIPTORS: SHARP;ACHING

## 2018-12-20 NOTE — ED PROVIDER NOTES
[facility] EMERGENCY DEPT  eMERGENCY dEPARTMENT eNCOUnter      Pt Name: Matt Mccain  MRN: [id number]  [place] 1985  Date of evaluation: 12/19/2018  Provider: BERNABE England    CHIEF COMPLAINT       Chief Complaint   Patient presents with    Dental Pain     right lower tooth pain         HISTORY OF PRESENT ILLNESS   (Location/Symptom, Timing/Onset,Context/Setting, Quality, Duration, Modifying Factors, Severity)  Note limiting factors. Matt Mccain is a 35 y.o. female who presents to the emergency department with complaint of right lower dental pain. Pt states started yesterday. She relates she went to a walk in clinic and yesterday and was put on PCN. SHe states today/tonight she has had swelling, pain, and difficulty swallowing/breathing. She states she has had fever 100-101. She has appt with dentist tomorrow. She is getting fertility treatments. LMP 2 days ago. HPI    NursingNotes were reviewed. REVIEW OF SYSTEMS    (2-9 systems for level 4, 10 or more for level 5)     Review of Systems   Constitutional: Negative for activity change, appetite change, fatigue, fever and unexpected weight change. HENT: Positive for dental problem and trouble swallowing. Negative for congestion, hearing loss, rhinorrhea, sinus pressure and sore throat. Eyes: Negative for visual disturbance. Respiratory: Positive for shortness of breath. Negative for cough. Cardiovascular: Negative for chest pain, palpitations and leg swelling. Gastrointestinal: Negative for abdominal pain, constipation, diarrhea, nausea and vomiting. Endocrine: Negative for cold intolerance and heat intolerance. Genitourinary: Negative for flank pain, menstrual problem, pelvic pain, urgency and vaginal discharge. Musculoskeletal: Negative for arthralgias. Skin: Negative for rash. Neurological: Negative for headaches. Psychiatric/Behavioral: Negative for dysphoric mood and sleep disturbance.  The patient is not gland is hyperenhancing and mildly enlarged, consistent with sialoadenitis and suggesting that abscess/inflammatory changes involve the submandibular duct. No evidence of sialolith. Fat stranding and right-sided airspace. Numerous dental caries including at right third mandibular molar and abscess abuts the buccal cortex adjacent to the right third mandibular molar (involving the medial pterygoid muscle), but no associated periapical lucency or cortical erosion. Prominent level I and mildly enlarged right level II lymph nodes. Effacement of right aspect of vallecula. Airway is patent  Radiologist: Philip Leigh MD           ED BEDSIDE ULTRASOUND:   Performed by ED Physician - none    LABS:  Labs Reviewed   CBC WITH AUTO DIFFERENTIAL - Abnormal; Notable for the following:        Result Value    WBC 19.1 (*)     MCH 26.6 (*)     MCHC 32.1 (*)     RDW 15.2 (*)     Neutrophils % 89.5 (*)     Lymphocytes % 4.0 (*)     Neutrophils # 17.1 (*)     Lymphocytes # 0.8 (*)     Monocytes # 1.10 (*)     All other components within normal limits   COMPREHENSIVE METABOLIC PANEL - Abnormal; Notable for the following:     Sodium 132 (*)     CO2 19 (*)     Glucose 224 (*)     Alkaline Phosphatase 148 (*)     All other components within normal limits   HCG, SERUM, QUALITATIVE       All other labs were within normal range or not returned as of this dictation. EMERGENCY DEPARTMENT COURSE and DIFFERENTIALDIAGNOSIS/MDM:   Vitals:    Vitals:    12/20/18 0007 12/20/18 0141 12/20/18 0248   BP: (!) 163/100 137/85 (!) 135/91   Pulse: 92 104 110   Resp: 18 16 16   Temp: 98.2 °F (36.8 °C)  98.2 °F (36.8 °C)   SpO2: 96% 95% 95%   Weight: 220 lb (99.8 kg)     Height: 5' 5\" (1.651 m)         MDM  Discussed case with Dr Robert Fernandez. Discussed with Dr Samantha Boyer who declined stating pt needs to see OMF.     Rosa Flower - spoke with Dr Radha Plaza and he is agreeable to admit. He wants unasyn 2gm IV ever 6 hours. He will see her in the morning.

## 2018-12-20 NOTE — ANESTHESIA PRE PROCEDURE
Department of Anesthesiology  Preprocedure Note       Name:  Chelo Kaur   Age:  35 y.o.  :  1985                                          MRN:  [id number]         Date:  2018      Surgeon: Keenan Vargas):  Felipa Holt DMD    Procedure: #32 POSSIBLE #17 DENTAL EXTRACTION (N/A )  INCISION AND DRAINAGE SUBMANDIBULAR ABSCESS (N/A Mouth)    Medications prior to admission:   Prior to Admission medications    Medication Sig Start Date End Date Taking? Authorizing Provider   penicillin v potassium (VEETID) 500 MG tablet Take 500 mg by mouth 4 times daily   Yes Historical Provider, MD   Estradiol 4 MCG INST Take by mouth   Yes Historical Provider, MD   PROGESTERONE MICRONIZED TD Place 2 mLs onto the skin   Yes Historical Provider, MD   PredniSONE 10 MG (21) TBPK Take by mouth   Yes Historical Provider, MD   Insulin Pump - Insulin regular Inject 3 Units/hr into the skin continuous Insulin-to-Carb Ratio (ICR):   Insulin Sensitivity Factor (ISF):  mg/dL per unit of insulin  Target Blood Glucose: mg/dL  Bolus Frequency:   Yes Historical Provider, MD       Current medications:    Current Facility-Administered Medications   Medication Dose Route Frequency Provider Last Rate Last Dose    [MAR Hold] sodium chloride flush 0.9 % injection 10 mL  10 mL Intravenous 2 times per day Felipa Breath, DMD   10 mL at 18 0921    [MAR Hold] sodium chloride flush 0.9 % injection 10 mL  10 mL Intravenous PRN Felipa Breath, DMD        [facility] enoxaparin (LOVENOX) injection 40 mg  40 mg Subcutaneous Daily Felipa Breath, DMD   Stopped at 18 0923    [MAR Hold] 0.9 % sodium chloride infusion   Intravenous Continuous Felipa Breath, DMD 75 mL/hr at 18 0922      [MAR Hold] morphine injection 2 mg  2 mg Intravenous Q2H PRN Felipa Breath, DMD   2 mg at 18 1205    Or    [MAR Hold] morphine injection 4 mg  4 mg Intravenous Q2H PRN Felipa Breath, DMD        [facility] glucose (GLUTOSE) 40 % oral gel 15 g  15 g Oral PRN Mercedes Larkin MD        [facility] Hold] dextrose 50 % solution 12.5 g  12.5 g Intravenous PRN Sean Key MD        [MAR Hold] glucagon (rDNA) injection 1 mg  1 mg Intramuscular PRN Mercedes Larkin MD        [facility] Hold] dextrose 5 % solution  100 mL/hr Intravenous PRN Mercedes Larkin MD        [facility] Hold] insulin lispro (HUMALOG) injection vial 0-12 Units  0-12 Units Subcutaneous Q4H Sean Key MD        [MAR Hold] ampicillin-sulbactam (UNASYN) 3 g ivpb minibag  3 g Intravenous Q6H Eardylon John  mL/hr at 12/20/18 1547 3 g at 12/20/18 1547    [MAR Hold] chlorhexidine (PERIDEX) 0.12 % solution 15 mL  15 mL Mouth/Throat BID Stan Nicole, DMD   15 mL at 12/20/18 0918    [MAR Hold] ondansetron (ZOFRAN) injection 4 mg  4 mg Intravenous Q6H PRN Stan Nicole, DMD   4 mg at 12/20/18 0740    [MAR Hold] promethazine (PHENERGAN) injection 25 mg  25 mg Intramuscular Q4H PRN Stan Nicole, DMD   25 mg at 12/20/18 1009    [MAR Hold] acetaminophen (TYLENOL) 160 MG/5ML solution 650 mg  650 mg Oral Q4H PRN Gurmeet Camilo MD        lactated ringers infusion   Intravenous Continuous Stan Nicole,  mL/hr at 12/20/18 1547         Allergies: Allergies   Allergen Reactions    Oxycodone-Acetaminophen Rash       Problem List:    Patient Active Problem List   Diagnosis Code    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 No pertinent surgical history.     Social History:    Social History   Substance Use Topics    Smoking status: Never Smoker    Smokeless tobacco: Never Used    Alcohol use No                                Counseling given: Not Answered      Vital Signs (Current):   Vitals:    12/20/18 0916 12/20/18 1000 12/20/18 1108 12/20/18 1423   BP: (!) 171/108 135/86 (!) 138/95 133/88   Pulse: 96 114 105 99   Resp:   16 16   Temp:   99 °F (37.2 °C) 99.8 °F

## 2018-12-20 NOTE — CONSULTS
INFECTIOUS DISEASES CONSULT NOTE    Patient:  Pilar Caraballo 35 y.o. female  ROOM # [unfilled]  YOB: 1985  MRN: [id number]  CSN:  [id number]  Admit date: 12/20/2018   Admitting Physician: Joanne Orozco DMD  Primary Care Physician: No primary care provider on file. REFERRING PROVIDER: No ref. provider found    Reason for Consultation: Recommendations for antibiotic management. Suspected soft tissue infection right mandibular area from dental source. History of Present Illness/Chief Complaint: Pleasant 24-year-old woman. She indicates on Sunday she bumped her right jaw on the handle of an elliptical machine. She indicates it was not a big deal. He really didn't hurt in her jaw and mouth was not bothering her following the minor accident. She indicates on Tuesday she developed some right jaundice, swelling. Symptoms progressed. She had increasing swelling around the right jaw and in the submandibular area on the right. She indicates was somewhat difficult to swallow. She presented to the hospital yesterday. She was admitted to the neurosurgery service. She's going to have surgery for irrigation/debridement/drain placement today. Current Scheduled Medications:    sodium chloride flush  10 mL Intravenous 2 times per day    enoxaparin  40 mg Subcutaneous Daily    insulin lispro  0-12 Units Subcutaneous Q4H    ampicillin-sulbactam  3 g Intravenous Q6H    chlorhexidine  15 mL Mouth/Throat BID     Current PRN Medications:  sodium chloride flush, morphine **OR** morphine, glucose, dextrose, glucagon (rDNA), dextrose, ondansetron, promethazine, acetaminophen    Allergies: Allergies   Allergen Reactions    Oxycodone-Acetaminophen Rash     Past Medical History: Diabetes mellitus. She indicates she is been undergoing some cycles of in vitro fertilization. Past Surgical History: Negative    Social History: . Lives in [facility]. 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 No tobacco or alcohol use.     Family
will order ekg , scd, chemical dvt prophylaxis when ok with oral surgery  10. She will be followed by hospitalist team  11.  Thanks for the consultation    cct 40 minutes     Maricarmen Kendall MD  .  [place] [facility]

## 2018-12-20 NOTE — H&P
normal and symmetric, no cranial nerve deficit, gait, coordination and speech normal, gait and coordination normal and speech normal      Labs: drawn      Radiology:  Ct shows air fluid right submandibular and submental space indicating abscess formation. Assessment:  1. Right submandibular abscess  2. Submental abscess  3. Sublingual abscess  4. Pericoronitis tooth # 32   5. Severe decay teeth 17 and 32. Plan:  1. Iv fluids and unasyn 2g q 6 hrs. 2.  Consult hospitalist for medical management. Filled by dr. Ashish Jones. 3.  Continuous pulse ox / patient monitoring. 4.  To OR today for extra oral incision and drainage of right submandibular, submental and lingual spaces as well as extraction of tooth # 32 and possible # 17.  5.  Wound gram stain with C&S.       Time spent on counseling/coordination of care: 45 Minutes  Total time spent with patient: Neeraj YAQUELIN  7:31 AM  12/20/2018

## 2018-12-21 ENCOUNTER — APPOINTMENT (OUTPATIENT)
Dept: CT IMAGING | Age: 33
DRG: 137 | End: 2018-12-21
Payer: OTHER GOVERNMENT

## 2018-12-21 PROBLEM — E11.9 TYPE 2 DIABETES MELLITUS (HCC): Status: ACTIVE | Noted: 2018-12-21

## 2018-12-21 LAB
ALBUMIN SERPL-MCNC: 2.6 G/DL (ref 3.5–5.2)
ALP BLD-CCNC: 123 U/L (ref 35–104)
ALT SERPL-CCNC: 21 U/L (ref 5–33)
ANION GAP SERPL CALCULATED.3IONS-SCNC: 15 MMOL/L (ref 7–19)
AST SERPL-CCNC: 19 U/L (ref 5–32)
BASOPHILS ABSOLUTE: 0 K/UL (ref 0–0.2)
BASOPHILS RELATIVE PERCENT: 0.1 % (ref 0–1)
BILIRUB SERPL-MCNC: 0.4 MG/DL (ref 0.2–1.2)
BUN BLDV-MCNC: 5 MG/DL (ref 6–20)
CALCIUM SERPL-MCNC: 8.5 MG/DL (ref 8.6–10)
CHLORIDE BLD-SCNC: 104 MMOL/L (ref 98–111)
CO2: 19 MMOL/L (ref 22–29)
CREAT SERPL-MCNC: 0.5 MG/DL (ref 0.5–0.9)
EOSINOPHILS ABSOLUTE: 0 K/UL (ref 0–0.6)
EOSINOPHILS RELATIVE PERCENT: 0 % (ref 0–5)
GFR NON-AFRICAN AMERICAN: >60
GLUCOSE BLD-MCNC: 124 MG/DL (ref 70–99)
GLUCOSE BLD-MCNC: 128 MG/DL (ref 70–99)
GLUCOSE BLD-MCNC: 144 MG/DL (ref 70–99)
GLUCOSE BLD-MCNC: 153 MG/DL (ref 70–99)
GLUCOSE BLD-MCNC: 180 MG/DL (ref 74–109)
GLUCOSE BLD-MCNC: 184 MG/DL (ref 70–99)
HCT VFR BLD CALC: 36.2 % (ref 37–47)
HEMOGLOBIN: 11.3 G/DL (ref 12–16)
LYMPHOCYTES ABSOLUTE: 1 K/UL (ref 1.1–4.5)
LYMPHOCYTES RELATIVE PERCENT: 5.9 % (ref 20–40)
MCH RBC QN AUTO: 26.5 PG (ref 27–31)
MCHC RBC AUTO-ENTMCNC: 31.2 G/DL (ref 33–37)
MCV RBC AUTO: 84.8 FL (ref 81–99)
MONOCYTES ABSOLUTE: 1.2 K/UL (ref 0–0.9)
MONOCYTES RELATIVE PERCENT: 7.4 % (ref 0–10)
NEUTROPHILS ABSOLUTE: 14.4 K/UL (ref 1.5–7.5)
NEUTROPHILS RELATIVE PERCENT: 86 % (ref 50–65)
PDW BLD-RTO: 15.7 % (ref 11.5–14.5)
PERFORMED ON: ABNORMAL
PLATELET # BLD: 349 K/UL (ref 130–400)
PMV BLD AUTO: 9.7 FL (ref 9.4–12.3)
POTASSIUM SERPL-SCNC: 3.4 MMOL/L (ref 3.5–5)
RBC # BLD: 4.27 M/UL (ref 4.2–5.4)
SODIUM BLD-SCNC: 138 MMOL/L (ref 136–145)
TOTAL PROTEIN: 6.7 G/DL (ref 6.6–8.7)
WBC # BLD: 16.7 K/UL (ref 4.8–10.8)

## 2018-12-21 PROCEDURE — 0W9500Z DRAINAGE OF LOWER JAW WITH DRAINAGE DEVICE, OPEN APPROACH: ICD-10-PCS | Performed by: DENTIST

## 2018-12-21 PROCEDURE — 36415 COLL VENOUS BLD VENIPUNCTURE: CPT

## 2018-12-21 PROCEDURE — 85025 COMPLETE CBC W/AUTO DIFF WBC: CPT

## 2018-12-21 PROCEDURE — 82948 REAGENT STRIP/BLOOD GLUCOSE: CPT

## 2018-12-21 PROCEDURE — 70490 CT SOFT TISSUE NECK W/O DYE: CPT

## 2018-12-21 PROCEDURE — 6360000002 HC RX W HCPCS: Performed by: DENTIST

## 2018-12-21 PROCEDURE — 2580000003 HC RX 258: Performed by: DENTIST

## 2018-12-21 PROCEDURE — 80053 COMPREHEN METABOLIC PANEL: CPT

## 2018-12-21 PROCEDURE — 6360000002 HC RX W HCPCS

## 2018-12-21 PROCEDURE — 6360000002 HC RX W HCPCS: Performed by: INTERNAL MEDICINE

## 2018-12-21 PROCEDURE — 6360000002 HC RX W HCPCS: Performed by: EMERGENCY MEDICINE

## 2018-12-21 PROCEDURE — 2580000003 HC RX 258: Performed by: EMERGENCY MEDICINE

## 2018-12-21 PROCEDURE — 99232 SBSQ HOSP IP/OBS MODERATE 35: CPT | Performed by: INTERNAL MEDICINE

## 2018-12-21 PROCEDURE — 1210000000 HC MED SURG R&B

## 2018-12-21 RX ORDER — LORAZEPAM 2 MG/ML
INJECTION INTRAMUSCULAR
Status: COMPLETED
Start: 2018-12-21 | End: 2018-12-21

## 2018-12-21 RX ORDER — LORAZEPAM 2 MG/ML
1 INJECTION INTRAMUSCULAR EVERY 4 HOURS PRN
Status: DISCONTINUED | OUTPATIENT
Start: 2018-12-21 | End: 2018-12-25 | Stop reason: HOSPADM

## 2018-12-21 RX ORDER — POTASSIUM CHLORIDE 7.45 MG/ML
10 INJECTION INTRAVENOUS
Status: COMPLETED | OUTPATIENT
Start: 2018-12-21 | End: 2018-12-22

## 2018-12-21 RX ADMIN — POTASSIUM CHLORIDE 10 MEQ: 7.46 INJECTION, SOLUTION INTRAVENOUS at 23:25

## 2018-12-21 RX ADMIN — AMPICILLIN SODIUM AND SULBACTAM SODIUM 3 G: 2; 1 INJECTION, POWDER, FOR SOLUTION INTRAMUSCULAR; INTRAVENOUS at 08:21

## 2018-12-21 RX ADMIN — AMPICILLIN SODIUM AND SULBACTAM SODIUM 3 G: 2; 1 INJECTION, POWDER, FOR SOLUTION INTRAMUSCULAR; INTRAVENOUS at 21:41

## 2018-12-21 RX ADMIN — SODIUM CHLORIDE: 9 INJECTION, SOLUTION INTRAVENOUS at 12:16

## 2018-12-21 RX ADMIN — Medication 10 ML: at 08:22

## 2018-12-21 RX ADMIN — MORPHINE SULFATE 2 MG: 2 INJECTION, SOLUTION INTRAMUSCULAR; INTRAVENOUS at 08:24

## 2018-12-21 RX ADMIN — MORPHINE SULFATE 2 MG: 2 INJECTION, SOLUTION INTRAMUSCULAR; INTRAVENOUS at 12:16

## 2018-12-21 RX ADMIN — AMPICILLIN SODIUM AND SULBACTAM SODIUM 3 G: 2; 1 INJECTION, POWDER, FOR SOLUTION INTRAMUSCULAR; INTRAVENOUS at 15:36

## 2018-12-21 RX ADMIN — POTASSIUM CHLORIDE 10 MEQ: 7.46 INJECTION, SOLUTION INTRAVENOUS at 19:48

## 2018-12-21 RX ADMIN — CHLORHEXIDINE GLUCONATE 15 ML: 1.2 RINSE ORAL at 23:03

## 2018-12-21 RX ADMIN — AMPICILLIN SODIUM AND SULBACTAM SODIUM 3 G: 2; 1 INJECTION, POWDER, FOR SOLUTION INTRAMUSCULAR; INTRAVENOUS at 03:01

## 2018-12-21 RX ADMIN — LORAZEPAM 1 MG: 2 INJECTION INTRAMUSCULAR; INTRAVENOUS at 10:00

## 2018-12-21 RX ADMIN — CHLORHEXIDINE GLUCONATE 15 ML: 1.2 RINSE ORAL at 08:22

## 2018-12-21 RX ADMIN — LORAZEPAM 2 MG: 2 INJECTION INTRAMUSCULAR; INTRAVENOUS at 09:37

## 2018-12-21 ASSESSMENT — PAIN SCALES - GENERAL
PAINLEVEL_OUTOF10: 0
PAINLEVEL_OUTOF10: 0
PAINLEVEL_OUTOF10: 6
PAINLEVEL_OUTOF10: 5
PAINLEVEL_OUTOF10: 0
PAINLEVEL_OUTOF10: 7

## 2018-12-21 ASSESSMENT — PAIN DESCRIPTION - LOCATION: LOCATION: NECK

## 2018-12-21 ASSESSMENT — PAIN DESCRIPTION - PAIN TYPE: TYPE: ACUTE PAIN

## 2018-12-21 NOTE — OP NOTE
OPERATIVE NOTE    DATE OF PROCEDURE: 12/20/2018     SURGEON: LUIS CARLOS ALEXIS    ASSISTANT: surgical assistant     PREOPERATIVE DIAGNOSIS:   1. Pericoronitis 17 and 32  2. Right submandibular abscess  3. Submental abscess  4. Left submandibular abscess. 5.  Right sublingual abscess. POSTOPERATIVE DIAGNOSIS:   same    OPERATION:   1. Extraoral incision and drainage right and left submandibular abscess. 2.  Extraoral incision and drainage right and left sublingual abscess. 3.  Extraoral incision and drainage submental abscess. 4.  Surgical extraction of teeth 17 and 32. ANESTHESIA: General    ESTIMATED BLOOD LOSS:  less than 50      COMPLICATIONS: None     SPECIMENS: Was Obtained:   Gram stain / culture and sensitivity    HISTORY: The patient is a 35y.o. year old female with history of above preop diagnosis. I explained the risk, benefits, expected outcome, and alternatives to the procedure. Patient understands and is in agreement. PROCEDURE:    As above. The patient was sent to PACU in good condition.      Electronically signed by Luz Maria Rubio DMD on 12/20/2018 at 6:01 PM

## 2018-12-21 NOTE — PLAN OF CARE
Problem: Falls - Risk of:  Goal: Will remain free from falls  Will remain free from falls   Outcome: Ongoing    Goal: Absence of physical injury  Absence of physical injury   Outcome: Ongoing      Problem: Pain:  Goal: Pain level will decrease  Pain level will decrease  Outcome: Ongoing    Goal: Control of acute pain  Control of acute pain  Outcome: Ongoing      Problem: Skin Integrity:  Goal: Will show no infection signs and symptoms  Will show no infection signs and symptoms  Outcome: Ongoing

## 2018-12-21 NOTE — ANESTHESIA POSTPROCEDURE EVALUATION
Department of Anesthesiology  Postprocedure Note    Patient: Adrianne Harper  MRN: [id number]  YOB: 1985  Date of evaluation: 12/20/2018  Time:  6:04 PM     Procedure Summary     Date:  12/20/18 Room / Location:  [facility] OR  / [facility] OR    Anesthesia Start:  0810 Anesthesia Stop:  0502    Procedures:       #32 POSSIBLE #17 DENTAL EXTRACTION (N/A )      INCISION AND DRAINAGE SUBMANDIBULAR ABSCESS (N/A Mouth) Diagnosis:  (SUBMANDIBULAR ABSCESS)    Surgeon:  Carlito Terrazas DMD Responsible Provider:  BERNABE Dick CRNA    Anesthesia Type:  general ASA Status:  2          Anesthesia Type: general    Violeta Phase I:      Violeta Phase II:      Last vitals: Reviewed and per EMR flowsheets.        Anesthesia Post Evaluation    Patient location during evaluation: bedside  Patient participation: complete - patient participated  Level of consciousness: awake and alert  Pain score: 0  Airway patency: patent  Nausea & Vomiting: no nausea  Complications: no  Cardiovascular status: hemodynamically stable  Respiratory status: acceptable  Hydration status: euvolemic

## 2018-12-22 ENCOUNTER — APPOINTMENT (OUTPATIENT)
Dept: CT IMAGING | Age: 33
DRG: 137 | End: 2018-12-22
Payer: OTHER GOVERNMENT

## 2018-12-22 LAB
ALBUMIN SERPL-MCNC: 2.4 G/DL (ref 3.5–5.2)
ALP BLD-CCNC: 110 U/L (ref 35–104)
ALT SERPL-CCNC: 18 U/L (ref 5–33)
ANION GAP SERPL CALCULATED.3IONS-SCNC: 16 MMOL/L (ref 7–19)
AST SERPL-CCNC: 18 U/L (ref 5–32)
BASOPHILS ABSOLUTE: 0 K/UL (ref 0–0.2)
BASOPHILS RELATIVE PERCENT: 0.3 % (ref 0–1)
BILIRUB SERPL-MCNC: <0.2 MG/DL (ref 0.2–1.2)
BUN BLDV-MCNC: 6 MG/DL (ref 6–20)
CALCIUM SERPL-MCNC: 8.3 MG/DL (ref 8.6–10)
CHLORIDE BLD-SCNC: 104 MMOL/L (ref 98–111)
CO2: 19 MMOL/L (ref 22–29)
CREAT SERPL-MCNC: <0.5 MG/DL (ref 0.5–0.9)
EOSINOPHILS ABSOLUTE: 0 K/UL (ref 0–0.6)
EOSINOPHILS RELATIVE PERCENT: 0.3 % (ref 0–5)
GFR NON-AFRICAN AMERICAN: >60
GLUCOSE BLD-MCNC: 72 MG/DL (ref 70–99)
GLUCOSE BLD-MCNC: 79 MG/DL (ref 70–99)
GLUCOSE BLD-MCNC: 80 MG/DL (ref 70–99)
GLUCOSE BLD-MCNC: 81 MG/DL (ref 74–109)
GLUCOSE BLD-MCNC: 87 MG/DL (ref 70–99)
HCT VFR BLD CALC: 32.2 % (ref 37–47)
HEMOGLOBIN: 10.3 G/DL (ref 12–16)
LYMPHOCYTES ABSOLUTE: 1.5 K/UL (ref 1.1–4.5)
LYMPHOCYTES RELATIVE PERCENT: 11.1 % (ref 20–40)
MAGNESIUM: 1.8 MG/DL (ref 1.6–2.6)
MCH RBC QN AUTO: 27.1 PG (ref 27–31)
MCHC RBC AUTO-ENTMCNC: 32 G/DL (ref 33–37)
MCV RBC AUTO: 84.7 FL (ref 81–99)
MONOCYTES ABSOLUTE: 0.9 K/UL (ref 0–0.9)
MONOCYTES RELATIVE PERCENT: 6.7 % (ref 0–10)
NEUTROPHILS ABSOLUTE: 11.1 K/UL (ref 1.5–7.5)
NEUTROPHILS RELATIVE PERCENT: 81.2 % (ref 50–65)
PDW BLD-RTO: 15.9 % (ref 11.5–14.5)
PERFORMED ON: NORMAL
PLATELET # BLD: 359 K/UL (ref 130–400)
PMV BLD AUTO: 9.5 FL (ref 9.4–12.3)
POTASSIUM SERPL-SCNC: 3.4 MMOL/L (ref 3.5–5)
RBC # BLD: 3.8 M/UL (ref 4.2–5.4)
SODIUM BLD-SCNC: 139 MMOL/L (ref 136–145)
TOTAL PROTEIN: 6.5 G/DL (ref 6.6–8.7)
WBC # BLD: 13.7 K/UL (ref 4.8–10.8)

## 2018-12-22 PROCEDURE — 6360000002 HC RX W HCPCS: Performed by: INTERNAL MEDICINE

## 2018-12-22 PROCEDURE — 99231 SBSQ HOSP IP/OBS SF/LOW 25: CPT | Performed by: INTERNAL MEDICINE

## 2018-12-22 PROCEDURE — 1210000000 HC MED SURG R&B

## 2018-12-22 PROCEDURE — 71260 CT THORAX DX C+: CPT

## 2018-12-22 PROCEDURE — 36415 COLL VENOUS BLD VENIPUNCTURE: CPT

## 2018-12-22 PROCEDURE — 6360000004 HC RX CONTRAST MEDICATION: Performed by: DENTIST

## 2018-12-22 PROCEDURE — 70491 CT SOFT TISSUE NECK W/DYE: CPT

## 2018-12-22 PROCEDURE — 83735 ASSAY OF MAGNESIUM: CPT

## 2018-12-22 PROCEDURE — 6360000002 HC RX W HCPCS: Performed by: DENTIST

## 2018-12-22 PROCEDURE — 85025 COMPLETE CBC W/AUTO DIFF WBC: CPT

## 2018-12-22 PROCEDURE — 2580000003 HC RX 258: Performed by: DENTIST

## 2018-12-22 PROCEDURE — 82948 REAGENT STRIP/BLOOD GLUCOSE: CPT

## 2018-12-22 PROCEDURE — 94762 N-INVAS EAR/PLS OXIMTRY CONT: CPT

## 2018-12-22 PROCEDURE — 80053 COMPREHEN METABOLIC PANEL: CPT

## 2018-12-22 PROCEDURE — 2580000003 HC RX 258: Performed by: INTERNAL MEDICINE

## 2018-12-22 RX ORDER — VANCOMYCIN HYDROCHLORIDE 1 G/200ML
1000 INJECTION, SOLUTION INTRAVENOUS EVERY 12 HOURS
Status: DISCONTINUED | OUTPATIENT
Start: 2018-12-22 | End: 2018-12-22 | Stop reason: DRUGHIGH

## 2018-12-22 RX ADMIN — CHLORHEXIDINE GLUCONATE 15 ML: 1.2 RINSE ORAL at 22:25

## 2018-12-22 RX ADMIN — MORPHINE SULFATE 2 MG: 2 INJECTION, SOLUTION INTRAMUSCULAR; INTRAVENOUS at 14:31

## 2018-12-22 RX ADMIN — ONDANSETRON 4 MG: 2 INJECTION INTRAMUSCULAR; INTRAVENOUS at 00:48

## 2018-12-22 RX ADMIN — PROMETHAZINE HYDROCHLORIDE 25 MG: 25 INJECTION, SOLUTION INTRAMUSCULAR; INTRAVENOUS at 20:45

## 2018-12-22 RX ADMIN — AMPICILLIN SODIUM AND SULBACTAM SODIUM 3 G: 2; 1 INJECTION, POWDER, FOR SOLUTION INTRAMUSCULAR; INTRAVENOUS at 22:25

## 2018-12-22 RX ADMIN — CHLORHEXIDINE GLUCONATE 15 ML: 1.2 RINSE ORAL at 10:11

## 2018-12-22 RX ADMIN — SODIUM CHLORIDE: 9 INJECTION, SOLUTION INTRAVENOUS at 20:30

## 2018-12-22 RX ADMIN — VANCOMYCIN HYDROCHLORIDE 1250 MG: 10 INJECTION, POWDER, LYOPHILIZED, FOR SOLUTION INTRAVENOUS at 20:30

## 2018-12-22 RX ADMIN — IOPAMIDOL 90 ML: 755 INJECTION, SOLUTION INTRAVENOUS at 12:27

## 2018-12-22 RX ADMIN — AMPICILLIN SODIUM AND SULBACTAM SODIUM 3 G: 2; 1 INJECTION, POWDER, FOR SOLUTION INTRAMUSCULAR; INTRAVENOUS at 16:08

## 2018-12-22 RX ADMIN — MORPHINE SULFATE 2 MG: 2 INJECTION, SOLUTION INTRAMUSCULAR; INTRAVENOUS at 10:56

## 2018-12-22 RX ADMIN — AMPICILLIN SODIUM AND SULBACTAM SODIUM 3 G: 2; 1 INJECTION, POWDER, FOR SOLUTION INTRAMUSCULAR; INTRAVENOUS at 03:22

## 2018-12-22 RX ADMIN — MORPHINE SULFATE 2 MG: 2 INJECTION, SOLUTION INTRAMUSCULAR; INTRAVENOUS at 00:48

## 2018-12-22 RX ADMIN — POTASSIUM CHLORIDE 10 MEQ: 7.46 INJECTION, SOLUTION INTRAVENOUS at 04:36

## 2018-12-22 RX ADMIN — AMPICILLIN SODIUM AND SULBACTAM SODIUM 3 G: 2; 1 INJECTION, POWDER, FOR SOLUTION INTRAMUSCULAR; INTRAVENOUS at 10:11

## 2018-12-22 RX ADMIN — SODIUM CHLORIDE: 9 INJECTION, SOLUTION INTRAVENOUS at 01:14

## 2018-12-22 RX ADMIN — POTASSIUM CHLORIDE 10 MEQ: 7.46 INJECTION, SOLUTION INTRAVENOUS at 01:15

## 2018-12-22 RX ADMIN — VANCOMYCIN HYDROCHLORIDE 1250 MG: 10 INJECTION, POWDER, LYOPHILIZED, FOR SOLUTION INTRAVENOUS at 14:08

## 2018-12-22 RX ADMIN — MORPHINE SULFATE 2 MG: 2 INJECTION, SOLUTION INTRAMUSCULAR; INTRAVENOUS at 20:30

## 2018-12-22 ASSESSMENT — PAIN SCALES - GENERAL
PAINLEVEL_OUTOF10: 7
PAINLEVEL_OUTOF10: 10
PAINLEVEL_OUTOF10: 7
PAINLEVEL_OUTOF10: 7

## 2018-12-22 ASSESSMENT — PAIN DESCRIPTION - PAIN TYPE: TYPE: ACUTE PAIN;SURGICAL PAIN

## 2018-12-22 ASSESSMENT — PAIN DESCRIPTION - LOCATION: LOCATION: JAW;NECK

## 2018-12-22 NOTE — PLAN OF CARE
Problem: Falls - Risk of:  Goal: Will remain free from falls  Will remain free from falls   Outcome: Ongoing    Goal: Absence of physical injury  Absence of physical injury   Outcome: Ongoing      Problem: Pain:  Goal: Pain level will decrease  Pain level will decrease   Outcome: Ongoing    Goal: Control of acute pain  Control of acute pain   Outcome: Ongoing    Goal: Control of chronic pain  Control of chronic pain   Outcome: Ongoing      Problem: Skin Integrity:  Goal: Will show no infection signs and symptoms  Will show no infection signs and symptoms   Outcome: Ongoing    Goal: Absence of new skin breakdown  Absence of new skin breakdown   Outcome: Ongoing

## 2018-12-23 LAB
ALBUMIN SERPL-MCNC: 2.6 G/DL (ref 3.5–5.2)
ALP BLD-CCNC: 92 U/L (ref 35–104)
ALT SERPL-CCNC: 18 U/L (ref 5–33)
ANION GAP SERPL CALCULATED.3IONS-SCNC: 10 MMOL/L (ref 7–19)
AST SERPL-CCNC: 19 U/L (ref 5–32)
BASOPHILS ABSOLUTE: 0 K/UL (ref 0–0.2)
BASOPHILS RELATIVE PERCENT: 0.3 % (ref 0–1)
BILIRUB SERPL-MCNC: 0.3 MG/DL (ref 0.2–1.2)
BUN BLDV-MCNC: 5 MG/DL (ref 6–20)
CALCIUM SERPL-MCNC: 8.2 MG/DL (ref 8.6–10)
CHLORIDE BLD-SCNC: 102 MMOL/L (ref 98–111)
CO2: 25 MMOL/L (ref 22–29)
CREAT SERPL-MCNC: <0.5 MG/DL (ref 0.5–0.9)
EOSINOPHILS ABSOLUTE: 0.2 K/UL (ref 0–0.6)
EOSINOPHILS RELATIVE PERCENT: 2 % (ref 0–5)
GFR NON-AFRICAN AMERICAN: >60
GLUCOSE BLD-MCNC: 126 MG/DL (ref 70–99)
GLUCOSE BLD-MCNC: 147 MG/DL (ref 70–99)
GLUCOSE BLD-MCNC: 151 MG/DL (ref 70–99)
GLUCOSE BLD-MCNC: 154 MG/DL (ref 70–99)
GLUCOSE BLD-MCNC: 56 MG/DL (ref 74–109)
GLUCOSE BLD-MCNC: 92 MG/DL (ref 70–99)
HCT VFR BLD CALC: 30.5 % (ref 37–47)
HEMOGLOBIN: 9.7 G/DL (ref 12–16)
LYMPHOCYTES ABSOLUTE: 1.7 K/UL (ref 1.1–4.5)
LYMPHOCYTES RELATIVE PERCENT: 17.7 % (ref 20–40)
MAGNESIUM: 1.8 MG/DL (ref 1.6–2.6)
MCH RBC QN AUTO: 26.6 PG (ref 27–31)
MCHC RBC AUTO-ENTMCNC: 31.8 G/DL (ref 33–37)
MCV RBC AUTO: 83.8 FL (ref 81–99)
MONOCYTES ABSOLUTE: 0.8 K/UL (ref 0–0.9)
MONOCYTES RELATIVE PERCENT: 7.8 % (ref 0–10)
NEUTROPHILS ABSOLUTE: 7 K/UL (ref 1.5–7.5)
NEUTROPHILS RELATIVE PERCENT: 71.8 % (ref 50–65)
PDW BLD-RTO: 15.7 % (ref 11.5–14.5)
PERFORMED ON: ABNORMAL
PERFORMED ON: NORMAL
PLATELET # BLD: 353 K/UL (ref 130–400)
PMV BLD AUTO: 9.1 FL (ref 9.4–12.3)
POTASSIUM SERPL-SCNC: 2.9 MMOL/L (ref 3.5–5)
POTASSIUM SERPL-SCNC: 3.9 MMOL/L (ref 3.5–5)
RBC # BLD: 3.64 M/UL (ref 4.2–5.4)
SODIUM BLD-SCNC: 137 MMOL/L (ref 136–145)
TOTAL PROTEIN: 6.4 G/DL (ref 6.6–8.7)
VANCOMYCIN TROUGH: 9.9 UG/ML (ref 10–20)
WBC # BLD: 9.7 K/UL (ref 4.8–10.8)

## 2018-12-23 PROCEDURE — 94762 N-INVAS EAR/PLS OXIMTRY CONT: CPT

## 2018-12-23 PROCEDURE — 2580000003 HC RX 258: Performed by: DENTIST

## 2018-12-23 PROCEDURE — 36415 COLL VENOUS BLD VENIPUNCTURE: CPT

## 2018-12-23 PROCEDURE — 6360000002 HC RX W HCPCS: Performed by: DENTIST

## 2018-12-23 PROCEDURE — 82948 REAGENT STRIP/BLOOD GLUCOSE: CPT

## 2018-12-23 PROCEDURE — 6360000002 HC RX W HCPCS: Performed by: INTERNAL MEDICINE

## 2018-12-23 PROCEDURE — 84132 ASSAY OF SERUM POTASSIUM: CPT

## 2018-12-23 PROCEDURE — 83735 ASSAY OF MAGNESIUM: CPT

## 2018-12-23 PROCEDURE — 85025 COMPLETE CBC W/AUTO DIFF WBC: CPT

## 2018-12-23 PROCEDURE — 6370000000 HC RX 637 (ALT 250 FOR IP): Performed by: INTERNAL MEDICINE

## 2018-12-23 PROCEDURE — 6370000000 HC RX 637 (ALT 250 FOR IP): Performed by: DENTIST

## 2018-12-23 PROCEDURE — 2580000003 HC RX 258: Performed by: INTERNAL MEDICINE

## 2018-12-23 PROCEDURE — 80053 COMPREHEN METABOLIC PANEL: CPT

## 2018-12-23 PROCEDURE — 99231 SBSQ HOSP IP/OBS SF/LOW 25: CPT | Performed by: INTERNAL MEDICINE

## 2018-12-23 PROCEDURE — 80202 ASSAY OF VANCOMYCIN: CPT

## 2018-12-23 PROCEDURE — 1210000000 HC MED SURG R&B

## 2018-12-23 RX ORDER — POTASSIUM CHLORIDE 7.45 MG/ML
20 INJECTION INTRAVENOUS ONCE
Status: DISCONTINUED | OUTPATIENT
Start: 2018-12-23 | End: 2018-12-23 | Stop reason: SDUPTHER

## 2018-12-23 RX ORDER — CHLORHEXIDINE GLUCONATE 0.12 MG/ML
15 RINSE ORAL 3 TIMES DAILY
Status: DISCONTINUED | OUTPATIENT
Start: 2018-12-23 | End: 2018-12-25 | Stop reason: HOSPADM

## 2018-12-23 RX ORDER — DEXTROSE AND SODIUM CHLORIDE 5; .9 G/100ML; G/100ML
INJECTION, SOLUTION INTRAVENOUS CONTINUOUS
Status: DISCONTINUED | OUTPATIENT
Start: 2018-12-23 | End: 2018-12-23

## 2018-12-23 RX ORDER — POTASSIUM CHLORIDE 20MEQ/15ML
40 LIQUID (ML) ORAL DAILY
Status: DISCONTINUED | OUTPATIENT
Start: 2018-12-23 | End: 2018-12-23

## 2018-12-23 RX ORDER — POTASSIUM CHLORIDE 7.45 MG/ML
10 INJECTION INTRAVENOUS
Status: COMPLETED | OUTPATIENT
Start: 2018-12-23 | End: 2018-12-23

## 2018-12-23 RX ADMIN — CHLORHEXIDINE GLUCONATE 15 ML: 1.2 RINSE ORAL at 16:04

## 2018-12-23 RX ADMIN — VANCOMYCIN HYDROCHLORIDE 1250 MG: 10 INJECTION, POWDER, LYOPHILIZED, FOR SOLUTION INTRAVENOUS at 20:06

## 2018-12-23 RX ADMIN — POTASSIUM CHLORIDE 40 MEQ: 1.5 SOLUTION ORAL at 09:42

## 2018-12-23 RX ADMIN — CHLORHEXIDINE GLUCONATE 15 ML: 1.2 RINSE ORAL at 09:43

## 2018-12-23 RX ADMIN — DEXTROSE AND SODIUM CHLORIDE: 5; 900 INJECTION, SOLUTION INTRAVENOUS at 04:30

## 2018-12-23 RX ADMIN — POTASSIUM CHLORIDE 10 MEQ: 7.46 INJECTION, SOLUTION INTRAVENOUS at 09:42

## 2018-12-23 RX ADMIN — AMPICILLIN SODIUM AND SULBACTAM SODIUM 3 G: 2; 1 INJECTION, POWDER, FOR SOLUTION INTRAMUSCULAR; INTRAVENOUS at 21:56

## 2018-12-23 RX ADMIN — AMPICILLIN SODIUM AND SULBACTAM SODIUM 3 G: 2; 1 INJECTION, POWDER, FOR SOLUTION INTRAMUSCULAR; INTRAVENOUS at 03:21

## 2018-12-23 RX ADMIN — VANCOMYCIN HYDROCHLORIDE 1250 MG: 10 INJECTION, POWDER, LYOPHILIZED, FOR SOLUTION INTRAVENOUS at 12:35

## 2018-12-23 RX ADMIN — MORPHINE SULFATE 2 MG: 2 INJECTION, SOLUTION INTRAMUSCULAR; INTRAVENOUS at 18:35

## 2018-12-23 RX ADMIN — VANCOMYCIN HYDROCHLORIDE 1250 MG: 10 INJECTION, POWDER, LYOPHILIZED, FOR SOLUTION INTRAVENOUS at 04:46

## 2018-12-23 RX ADMIN — CHLORHEXIDINE GLUCONATE 15 ML: 1.2 RINSE ORAL at 20:06

## 2018-12-23 RX ADMIN — Medication 10 ML: at 20:07

## 2018-12-23 RX ADMIN — POTASSIUM CHLORIDE 10 MEQ: 7.46 INJECTION, SOLUTION INTRAVENOUS at 07:45

## 2018-12-23 RX ADMIN — MORPHINE SULFATE 2 MG: 2 INJECTION, SOLUTION INTRAMUSCULAR; INTRAVENOUS at 03:24

## 2018-12-23 RX ADMIN — AMPICILLIN SODIUM AND SULBACTAM SODIUM 3 G: 2; 1 INJECTION, POWDER, FOR SOLUTION INTRAMUSCULAR; INTRAVENOUS at 16:03

## 2018-12-23 RX ADMIN — AMPICILLIN SODIUM AND SULBACTAM SODIUM 3 G: 2; 1 INJECTION, POWDER, FOR SOLUTION INTRAMUSCULAR; INTRAVENOUS at 11:33

## 2018-12-23 ASSESSMENT — PAIN DESCRIPTION - LOCATION
LOCATION: JAW;NECK
LOCATION: JAW;NECK

## 2018-12-23 ASSESSMENT — PAIN DESCRIPTION - PAIN TYPE: TYPE: ACUTE PAIN;SURGICAL PAIN

## 2018-12-23 ASSESSMENT — PAIN SCALES - GENERAL
PAINLEVEL_OUTOF10: 5
PAINLEVEL_OUTOF10: 6

## 2018-12-24 ENCOUNTER — ANESTHESIA (OUTPATIENT)
Dept: OPERATING ROOM | Age: 33
DRG: 137 | End: 2018-12-24
Payer: OTHER GOVERNMENT

## 2018-12-24 ENCOUNTER — ANESTHESIA EVENT (OUTPATIENT)
Dept: OPERATING ROOM | Age: 33
DRG: 137 | End: 2018-12-24
Payer: OTHER GOVERNMENT

## 2018-12-24 VITALS
OXYGEN SATURATION: 96 % | RESPIRATION RATE: 7 BRPM | DIASTOLIC BLOOD PRESSURE: 49 MMHG | TEMPERATURE: 98.6 F | SYSTOLIC BLOOD PRESSURE: 58 MMHG

## 2018-12-24 LAB
ALBUMIN SERPL-MCNC: 2.7 G/DL (ref 3.5–5.2)
ALP BLD-CCNC: 91 U/L (ref 35–104)
ALT SERPL-CCNC: 19 U/L (ref 5–33)
ANAEROBIC CULTURE: ABNORMAL
ANION GAP SERPL CALCULATED.3IONS-SCNC: 11 MMOL/L (ref 7–19)
AST SERPL-CCNC: 18 U/L (ref 5–32)
BASOPHILS ABSOLUTE: 0 K/UL (ref 0–0.2)
BASOPHILS RELATIVE PERCENT: 0.4 % (ref 0–1)
BILIRUB SERPL-MCNC: 0.3 MG/DL (ref 0.2–1.2)
BUN BLDV-MCNC: 4 MG/DL (ref 6–20)
CALCIUM SERPL-MCNC: 8.5 MG/DL (ref 8.6–10)
CHLORIDE BLD-SCNC: 104 MMOL/L (ref 98–111)
CO2: 26 MMOL/L (ref 22–29)
CREAT SERPL-MCNC: <0.5 MG/DL (ref 0.5–0.9)
CULTURE SURGICAL: ABNORMAL
EOSINOPHILS ABSOLUTE: 0.3 K/UL (ref 0–0.6)
EOSINOPHILS RELATIVE PERCENT: 4.3 % (ref 0–5)
GFR NON-AFRICAN AMERICAN: >60
GLUCOSE BLD-MCNC: 111 MG/DL (ref 74–109)
GLUCOSE BLD-MCNC: 180 MG/DL (ref 70–99)
GLUCOSE BLD-MCNC: 234 MG/DL (ref 70–99)
GLUCOSE BLD-MCNC: 305 MG/DL (ref 70–99)
GRAM STAIN RESULT: ABNORMAL
HCG(URINE) PREGNANCY TEST: NEGATIVE
HCT VFR BLD CALC: 31.7 % (ref 37–47)
HEMOGLOBIN: 10.2 G/DL (ref 12–16)
LYMPHOCYTES ABSOLUTE: 1.5 K/UL (ref 1.1–4.5)
LYMPHOCYTES RELATIVE PERCENT: 20.7 % (ref 20–40)
MAGNESIUM: 1.9 MG/DL (ref 1.6–2.6)
MCH RBC QN AUTO: 26.8 PG (ref 27–31)
MCHC RBC AUTO-ENTMCNC: 32.2 G/DL (ref 33–37)
MCV RBC AUTO: 83.4 FL (ref 81–99)
MONOCYTES ABSOLUTE: 0.6 K/UL (ref 0–0.9)
MONOCYTES RELATIVE PERCENT: 8.3 % (ref 0–10)
NEUTROPHILS ABSOLUTE: 4.8 K/UL (ref 1.5–7.5)
NEUTROPHILS RELATIVE PERCENT: 65.7 % (ref 50–65)
PDW BLD-RTO: 15.5 % (ref 11.5–14.5)
PERFORMED ON: ABNORMAL
PLATELET # BLD: 401 K/UL (ref 130–400)
PMV BLD AUTO: 9.4 FL (ref 9.4–12.3)
POTASSIUM SERPL-SCNC: 3.2 MMOL/L (ref 3.5–5)
RBC # BLD: 3.8 M/UL (ref 4.2–5.4)
SODIUM BLD-SCNC: 141 MMOL/L (ref 136–145)
TOTAL PROTEIN: 6.4 G/DL (ref 6.6–8.7)
WBC # BLD: 7.2 K/UL (ref 4.8–10.8)

## 2018-12-24 PROCEDURE — 82948 REAGENT STRIP/BLOOD GLUCOSE: CPT

## 2018-12-24 PROCEDURE — 76937 US GUIDE VASCULAR ACCESS: CPT

## 2018-12-24 PROCEDURE — 83735 ASSAY OF MAGNESIUM: CPT

## 2018-12-24 PROCEDURE — 1210000000 HC MED SURG R&B

## 2018-12-24 PROCEDURE — 3600000002 HC SURGERY LEVEL 2 BASE: Performed by: DENTIST

## 2018-12-24 PROCEDURE — 6370000000 HC RX 637 (ALT 250 FOR IP): Performed by: INTERNAL MEDICINE

## 2018-12-24 PROCEDURE — 3700000000 HC ANESTHESIA ATTENDED CARE: Performed by: DENTIST

## 2018-12-24 PROCEDURE — 99231 SBSQ HOSP IP/OBS SF/LOW 25: CPT | Performed by: INTERNAL MEDICINE

## 2018-12-24 PROCEDURE — 6360000002 HC RX W HCPCS: Performed by: DENTIST

## 2018-12-24 PROCEDURE — 6360000002 HC RX W HCPCS: Performed by: NURSE ANESTHETIST, CERTIFIED REGISTERED

## 2018-12-24 PROCEDURE — 0W950ZZ DRAINAGE OF LOWER JAW, OPEN APPROACH: ICD-10-PCS | Performed by: DENTIST

## 2018-12-24 PROCEDURE — 3700000001 HC ADD 15 MINUTES (ANESTHESIA): Performed by: DENTIST

## 2018-12-24 PROCEDURE — 2500000003 HC RX 250 WO HCPCS: Performed by: DENTIST

## 2018-12-24 PROCEDURE — 87075 CULTR BACTERIA EXCEPT BLOOD: CPT

## 2018-12-24 PROCEDURE — 84703 CHORIONIC GONADOTROPIN ASSAY: CPT

## 2018-12-24 PROCEDURE — 2580000003 HC RX 258: Performed by: NURSE ANESTHETIST, CERTIFIED REGISTERED

## 2018-12-24 PROCEDURE — 2580000003 HC RX 258: Performed by: DENTIST

## 2018-12-24 PROCEDURE — 3600000012 HC SURGERY LEVEL 2 ADDTL 15MIN: Performed by: DENTIST

## 2018-12-24 PROCEDURE — 85025 COMPLETE CBC W/AUTO DIFF WBC: CPT

## 2018-12-24 PROCEDURE — 87205 SMEAR GRAM STAIN: CPT

## 2018-12-24 PROCEDURE — 7100000001 HC PACU RECOVERY - ADDTL 15 MIN: Performed by: DENTIST

## 2018-12-24 PROCEDURE — 36569 INSJ PICC 5 YR+ W/O IMAGING: CPT

## 2018-12-24 PROCEDURE — 0CDXXZ1 EXTRACTION OF LOWER TOOTH, MULTIPLE, EXTERNAL APPROACH: ICD-10-PCS | Performed by: DENTIST

## 2018-12-24 PROCEDURE — C1751 CATH, INF, PER/CENT/MIDLINE: HCPCS

## 2018-12-24 PROCEDURE — 2709999900 HC NON-CHARGEABLE SUPPLY: Performed by: DENTIST

## 2018-12-24 PROCEDURE — 36415 COLL VENOUS BLD VENIPUNCTURE: CPT

## 2018-12-24 PROCEDURE — 2580000003 HC RX 258: Performed by: ANESTHESIOLOGY

## 2018-12-24 PROCEDURE — 2580000003 HC RX 258: Performed by: INTERNAL MEDICINE

## 2018-12-24 PROCEDURE — 94762 N-INVAS EAR/PLS OXIMTRY CONT: CPT

## 2018-12-24 PROCEDURE — 7100000000 HC PACU RECOVERY - FIRST 15 MIN: Performed by: DENTIST

## 2018-12-24 PROCEDURE — 2500000003 HC RX 250 WO HCPCS: Performed by: NURSE ANESTHETIST, CERTIFIED REGISTERED

## 2018-12-24 PROCEDURE — 6360000002 HC RX W HCPCS: Performed by: INTERNAL MEDICINE

## 2018-12-24 PROCEDURE — 80053 COMPREHEN METABOLIC PANEL: CPT

## 2018-12-24 PROCEDURE — 02HV33Z INSERTION OF INFUSION DEVICE INTO SUPERIOR VENA CAVA, PERCUTANEOUS APPROACH: ICD-10-PCS | Performed by: DENTIST

## 2018-12-24 PROCEDURE — 87070 CULTURE OTHR SPECIMN AEROBIC: CPT

## 2018-12-24 RX ORDER — SODIUM CHLORIDE 0.9 % (FLUSH) 0.9 %
10 SYRINGE (ML) INJECTION PRN
Status: DISCONTINUED | OUTPATIENT
Start: 2018-12-24 | End: 2018-12-25 | Stop reason: HOSPADM

## 2018-12-24 RX ORDER — METOCLOPRAMIDE HYDROCHLORIDE 5 MG/ML
10 INJECTION INTRAMUSCULAR; INTRAVENOUS
Status: DISCONTINUED | OUTPATIENT
Start: 2018-12-24 | End: 2018-12-24 | Stop reason: HOSPADM

## 2018-12-24 RX ORDER — PROMETHAZINE HYDROCHLORIDE 25 MG/ML
6.25 INJECTION, SOLUTION INTRAMUSCULAR; INTRAVENOUS
Status: DISCONTINUED | OUTPATIENT
Start: 2018-12-24 | End: 2018-12-24 | Stop reason: HOSPADM

## 2018-12-24 RX ORDER — MEPERIDINE HYDROCHLORIDE 50 MG/ML
12.5 INJECTION INTRAMUSCULAR; INTRAVENOUS; SUBCUTANEOUS EVERY 5 MIN PRN
Status: DISCONTINUED | OUTPATIENT
Start: 2018-12-24 | End: 2018-12-24 | Stop reason: HOSPADM

## 2018-12-24 RX ORDER — LIDOCAINE HYDROCHLORIDE 10 MG/ML
5 INJECTION, SOLUTION EPIDURAL; INFILTRATION; INTRACAUDAL; PERINEURAL ONCE
Status: DISCONTINUED | OUTPATIENT
Start: 2018-12-24 | End: 2018-12-25

## 2018-12-24 RX ORDER — FENTANYL CITRATE 50 UG/ML
50 INJECTION, SOLUTION INTRAMUSCULAR; INTRAVENOUS
Status: DISCONTINUED | OUTPATIENT
Start: 2018-12-24 | End: 2018-12-24 | Stop reason: HOSPADM

## 2018-12-24 RX ORDER — SODIUM CHLORIDE 0.9 % (FLUSH) 0.9 %
10 SYRINGE (ML) INJECTION EVERY 12 HOURS SCHEDULED
Status: DISCONTINUED | OUTPATIENT
Start: 2018-12-24 | End: 2018-12-25 | Stop reason: HOSPADM

## 2018-12-24 RX ORDER — FENTANYL CITRATE 50 UG/ML
25 INJECTION, SOLUTION INTRAMUSCULAR; INTRAVENOUS
Status: DISCONTINUED | OUTPATIENT
Start: 2018-12-24 | End: 2018-12-24 | Stop reason: HOSPADM

## 2018-12-24 RX ORDER — MIDAZOLAM HYDROCHLORIDE 1 MG/ML
2 INJECTION INTRAMUSCULAR; INTRAVENOUS
Status: DISCONTINUED | OUTPATIENT
Start: 2018-12-24 | End: 2018-12-24 | Stop reason: HOSPADM

## 2018-12-24 RX ORDER — ENALAPRILAT 2.5 MG/2ML
1.25 INJECTION INTRAVENOUS
Status: DISCONTINUED | OUTPATIENT
Start: 2018-12-24 | End: 2018-12-24 | Stop reason: HOSPADM

## 2018-12-24 RX ORDER — DIPHENHYDRAMINE HYDROCHLORIDE 50 MG/ML
12.5 INJECTION INTRAMUSCULAR; INTRAVENOUS
Status: DISCONTINUED | OUTPATIENT
Start: 2018-12-24 | End: 2018-12-24 | Stop reason: HOSPADM

## 2018-12-24 RX ORDER — LIDOCAINE HYDROCHLORIDE 10 MG/ML
1 INJECTION, SOLUTION EPIDURAL; INFILTRATION; INTRACAUDAL; PERINEURAL
Status: DISCONTINUED | OUTPATIENT
Start: 2018-12-24 | End: 2018-12-24 | Stop reason: HOSPADM

## 2018-12-24 RX ORDER — ROCURONIUM BROMIDE 10 MG/ML
INJECTION, SOLUTION INTRAVENOUS PRN
Status: DISCONTINUED | OUTPATIENT
Start: 2018-12-24 | End: 2018-12-24 | Stop reason: SDUPTHER

## 2018-12-24 RX ORDER — POTASSIUM CHLORIDE 20 MEQ/1
40 TABLET, EXTENDED RELEASE ORAL ONCE
Status: DISCONTINUED | OUTPATIENT
Start: 2018-12-24 | End: 2018-12-24

## 2018-12-24 RX ORDER — SUCCINYLCHOLINE CHLORIDE 20 MG/ML
INJECTION INTRAMUSCULAR; INTRAVENOUS PRN
Status: DISCONTINUED | OUTPATIENT
Start: 2018-12-24 | End: 2018-12-24 | Stop reason: SDUPTHER

## 2018-12-24 RX ORDER — MIDAZOLAM HYDROCHLORIDE 1 MG/ML
INJECTION INTRAMUSCULAR; INTRAVENOUS PRN
Status: DISCONTINUED | OUTPATIENT
Start: 2018-12-24 | End: 2018-12-24 | Stop reason: SDUPTHER

## 2018-12-24 RX ORDER — HYDRALAZINE HYDROCHLORIDE 20 MG/ML
5 INJECTION INTRAMUSCULAR; INTRAVENOUS EVERY 10 MIN PRN
Status: DISCONTINUED | OUTPATIENT
Start: 2018-12-24 | End: 2018-12-24 | Stop reason: HOSPADM

## 2018-12-24 RX ORDER — LABETALOL HYDROCHLORIDE 5 MG/ML
5 INJECTION, SOLUTION INTRAVENOUS EVERY 10 MIN PRN
Status: DISCONTINUED | OUTPATIENT
Start: 2018-12-24 | End: 2018-12-24 | Stop reason: HOSPADM

## 2018-12-24 RX ORDER — DEXAMETHASONE SODIUM PHOSPHATE 10 MG/ML
INJECTION INTRAMUSCULAR; INTRAVENOUS PRN
Status: DISCONTINUED | OUTPATIENT
Start: 2018-12-24 | End: 2018-12-24 | Stop reason: SDUPTHER

## 2018-12-24 RX ORDER — MORPHINE SULFATE 2 MG/ML
4 INJECTION, SOLUTION INTRAMUSCULAR; INTRAVENOUS EVERY 5 MIN PRN
Status: DISCONTINUED | OUTPATIENT
Start: 2018-12-24 | End: 2018-12-25 | Stop reason: HOSPADM

## 2018-12-24 RX ORDER — MORPHINE SULFATE 2 MG/ML
2 INJECTION, SOLUTION INTRAMUSCULAR; INTRAVENOUS EVERY 5 MIN PRN
Status: DISCONTINUED | OUTPATIENT
Start: 2018-12-24 | End: 2018-12-25 | Stop reason: HOSPADM

## 2018-12-24 RX ORDER — KETOROLAC TROMETHAMINE 30 MG/ML
INJECTION, SOLUTION INTRAMUSCULAR; INTRAVENOUS PRN
Status: DISCONTINUED | OUTPATIENT
Start: 2018-12-24 | End: 2018-12-24 | Stop reason: SDUPTHER

## 2018-12-24 RX ORDER — SODIUM CHLORIDE, SODIUM LACTATE, POTASSIUM CHLORIDE, CALCIUM CHLORIDE 600; 310; 30; 20 MG/100ML; MG/100ML; MG/100ML; MG/100ML
INJECTION, SOLUTION INTRAVENOUS CONTINUOUS PRN
Status: DISCONTINUED | OUTPATIENT
Start: 2018-12-24 | End: 2018-12-24 | Stop reason: SDUPTHER

## 2018-12-24 RX ORDER — ONDANSETRON 2 MG/ML
INJECTION INTRAMUSCULAR; INTRAVENOUS PRN
Status: DISCONTINUED | OUTPATIENT
Start: 2018-12-24 | End: 2018-12-24 | Stop reason: SDUPTHER

## 2018-12-24 RX ORDER — PROPOFOL 10 MG/ML
INJECTION, EMULSION INTRAVENOUS PRN
Status: DISCONTINUED | OUTPATIENT
Start: 2018-12-24 | End: 2018-12-24 | Stop reason: SDUPTHER

## 2018-12-24 RX ORDER — SODIUM CHLORIDE, SODIUM LACTATE, POTASSIUM CHLORIDE, CALCIUM CHLORIDE 600; 310; 30; 20 MG/100ML; MG/100ML; MG/100ML; MG/100ML
INJECTION, SOLUTION INTRAVENOUS CONTINUOUS
Status: DISCONTINUED | OUTPATIENT
Start: 2018-12-24 | End: 2018-12-24

## 2018-12-24 RX ADMIN — VANCOMYCIN HYDROCHLORIDE 1250 MG: 10 INJECTION, POWDER, LYOPHILIZED, FOR SOLUTION INTRAVENOUS at 21:58

## 2018-12-24 RX ADMIN — ROCURONIUM BROMIDE 5 MG: 10 INJECTION INTRAVENOUS at 09:41

## 2018-12-24 RX ADMIN — PROPOFOL 180 MG: 10 INJECTION, EMULSION INTRAVENOUS at 09:41

## 2018-12-24 RX ADMIN — HYDROMORPHONE HYDROCHLORIDE 0.5 MG: 1 INJECTION, SOLUTION INTRAMUSCULAR; INTRAVENOUS; SUBCUTANEOUS at 09:41

## 2018-12-24 RX ADMIN — AMPICILLIN SODIUM AND SULBACTAM SODIUM 3 G: 2; 1 INJECTION, POWDER, FOR SOLUTION INTRAMUSCULAR; INTRAVENOUS at 20:23

## 2018-12-24 RX ADMIN — KETOROLAC TROMETHAMINE 30 MG: 30 INJECTION, SOLUTION INTRAMUSCULAR; INTRAVENOUS at 09:49

## 2018-12-24 RX ADMIN — DEXAMETHASONE SODIUM PHOSPHATE 10 MG: 10 INJECTION INTRAMUSCULAR; INTRAVENOUS at 09:49

## 2018-12-24 RX ADMIN — ONDANSETRON HYDROCHLORIDE 4 MG: 2 INJECTION, SOLUTION INTRAMUSCULAR; INTRAVENOUS at 09:49

## 2018-12-24 RX ADMIN — AMPICILLIN SODIUM AND SULBACTAM SODIUM 3 G: 2; 1 INJECTION, POWDER, FOR SOLUTION INTRAMUSCULAR; INTRAVENOUS at 03:19

## 2018-12-24 RX ADMIN — AMPICILLIN SODIUM AND SULBACTAM SODIUM 3 G: 2; 1 INJECTION, POWDER, FOR SOLUTION INTRAMUSCULAR; INTRAVENOUS at 12:46

## 2018-12-24 RX ADMIN — CHLORHEXIDINE GLUCONATE 15 ML: 1.2 RINSE ORAL at 22:00

## 2018-12-24 RX ADMIN — SODIUM CHLORIDE, SODIUM LACTATE, POTASSIUM CHLORIDE, AND CALCIUM CHLORIDE: 600; 310; 30; 20 INJECTION, SOLUTION INTRAVENOUS at 08:02

## 2018-12-24 RX ADMIN — VANCOMYCIN HYDROCHLORIDE 1250 MG: 10 INJECTION, POWDER, LYOPHILIZED, FOR SOLUTION INTRAVENOUS at 04:20

## 2018-12-24 RX ADMIN — VANCOMYCIN HYDROCHLORIDE 1250 MG: 10 INJECTION, POWDER, LYOPHILIZED, FOR SOLUTION INTRAVENOUS at 12:00

## 2018-12-24 RX ADMIN — SODIUM CHLORIDE, SODIUM LACTATE, POTASSIUM CHLORIDE, AND CALCIUM CHLORIDE: 600; 310; 30; 20 INJECTION, SOLUTION INTRAVENOUS at 09:32

## 2018-12-24 RX ADMIN — MORPHINE SULFATE 2 MG: 2 INJECTION, SOLUTION INTRAMUSCULAR; INTRAVENOUS at 14:48

## 2018-12-24 RX ADMIN — POTASSIUM CHLORIDE 40 MEQ: 20 TABLET, EXTENDED RELEASE ORAL at 12:49

## 2018-12-24 RX ADMIN — MIDAZOLAM 2 MG: 1 INJECTION INTRAMUSCULAR; INTRAVENOUS at 09:33

## 2018-12-24 RX ADMIN — HYDROMORPHONE HYDROCHLORIDE 0.5 MG: 1 INJECTION, SOLUTION INTRAMUSCULAR; INTRAVENOUS; SUBCUTANEOUS at 09:53

## 2018-12-24 RX ADMIN — LORAZEPAM 1 MG: 2 INJECTION INTRAMUSCULAR; INTRAVENOUS at 11:47

## 2018-12-24 RX ADMIN — CHLORHEXIDINE GLUCONATE 15 ML: 1.2 RINSE ORAL at 14:48

## 2018-12-24 RX ADMIN — SUCCINYLCHOLINE CHLORIDE 160 MG: 20 INJECTION, SOLUTION INTRAMUSCULAR; INTRAVENOUS; PARENTERAL at 09:41

## 2018-12-24 ASSESSMENT — LIFESTYLE VARIABLES: SMOKING_STATUS: 0

## 2018-12-24 ASSESSMENT — PAIN SCALES - GENERAL
PAINLEVEL_OUTOF10: 0
PAINLEVEL_OUTOF10: 5
PAINLEVEL_OUTOF10: 3

## 2018-12-24 NOTE — OP NOTE
OPERATIVE NOTE    DATE OF PROCEDURE: 12/24/2018     SURGEON: LUIS CARLOS ALEXIS    ASSISTANT:   Bhupendra Canas    PREOPERATIVE DIAGNOSIS:   Ludwigs angina    POSTOPERATIVE DIAGNOSIS:   Ludwigs angina    OPERATION:   Incision and drainage right submandibular space and submental space. ANESTHESIA: General    ESTIMATED BLOOD LOSS:  less than 50      COMPLICATIONS: None     SPECIMENS: Was Obtained: swab for culture and sensitivity as well as gram stain. HISTORY: The patient is a 35y.o. year old female with history of above preop diagnosis. I explained the risk, benefits, expected outcome, and alternatives to the procedure. Patient understands and is in agreement. PROCEDURE:  1. Extra submandibular and submental drain exploration with purulence. The patient was sent to PACU in good condition.      Electronically signed by Sebastien Ring DMD on 12/24/2018 at 10:18 AM

## 2018-12-25 VITALS
SYSTOLIC BLOOD PRESSURE: 139 MMHG | RESPIRATION RATE: 16 BRPM | OXYGEN SATURATION: 96 % | DIASTOLIC BLOOD PRESSURE: 82 MMHG | BODY MASS INDEX: 36.59 KG/M2 | WEIGHT: 219.6 LBS | TEMPERATURE: 99 F | HEART RATE: 73 BPM | HEIGHT: 65 IN

## 2018-12-25 LAB
ALBUMIN SERPL-MCNC: 2.8 G/DL (ref 3.5–5.2)
ALP BLD-CCNC: 91 U/L (ref 35–104)
ALT SERPL-CCNC: 20 U/L (ref 5–33)
ANION GAP SERPL CALCULATED.3IONS-SCNC: 10 MMOL/L (ref 7–19)
AST SERPL-CCNC: 15 U/L (ref 5–32)
BASOPHILS ABSOLUTE: 0 K/UL (ref 0–0.2)
BASOPHILS RELATIVE PERCENT: 0.2 % (ref 0–1)
BILIRUB SERPL-MCNC: <0.2 MG/DL (ref 0.2–1.2)
BUN BLDV-MCNC: 7 MG/DL (ref 6–20)
CALCIUM SERPL-MCNC: 8.9 MG/DL (ref 8.6–10)
CHLORIDE BLD-SCNC: 105 MMOL/L (ref 98–111)
CO2: 24 MMOL/L (ref 22–29)
CREAT SERPL-MCNC: <0.5 MG/DL (ref 0.5–0.9)
EOSINOPHILS ABSOLUTE: 0 K/UL (ref 0–0.6)
EOSINOPHILS RELATIVE PERCENT: 0 % (ref 0–5)
GFR NON-AFRICAN AMERICAN: >60
GLUCOSE BLD-MCNC: 121 MG/DL (ref 70–99)
GLUCOSE BLD-MCNC: 141 MG/DL (ref 74–109)
GLUCOSE BLD-MCNC: 93 MG/DL (ref 70–99)
HCT VFR BLD CALC: 32 % (ref 37–47)
HEMOGLOBIN: 10.3 G/DL (ref 12–16)
LYMPHOCYTES ABSOLUTE: 1.4 K/UL (ref 1.1–4.5)
LYMPHOCYTES RELATIVE PERCENT: 11.9 % (ref 20–40)
MAGNESIUM: 2 MG/DL (ref 1.6–2.6)
MCH RBC QN AUTO: 26.6 PG (ref 27–31)
MCHC RBC AUTO-ENTMCNC: 32.2 G/DL (ref 33–37)
MCV RBC AUTO: 82.7 FL (ref 81–99)
MONOCYTES ABSOLUTE: 0.7 K/UL (ref 0–0.9)
MONOCYTES RELATIVE PERCENT: 6.2 % (ref 0–10)
NEUTROPHILS ABSOLUTE: 9.7 K/UL (ref 1.5–7.5)
NEUTROPHILS RELATIVE PERCENT: 80.7 % (ref 50–65)
PDW BLD-RTO: 15.3 % (ref 11.5–14.5)
PERFORMED ON: ABNORMAL
PERFORMED ON: NORMAL
PLATELET # BLD: 509 K/UL (ref 130–400)
PMV BLD AUTO: 9.1 FL (ref 9.4–12.3)
POTASSIUM SERPL-SCNC: 3.8 MMOL/L (ref 3.5–5)
RBC # BLD: 3.87 M/UL (ref 4.2–5.4)
SODIUM BLD-SCNC: 139 MMOL/L (ref 136–145)
TOTAL PROTEIN: 6.5 G/DL (ref 6.6–8.7)
WBC # BLD: 12 K/UL (ref 4.8–10.8)

## 2018-12-25 PROCEDURE — 6370000000 HC RX 637 (ALT 250 FOR IP): Performed by: DENTIST

## 2018-12-25 PROCEDURE — 82948 REAGENT STRIP/BLOOD GLUCOSE: CPT

## 2018-12-25 PROCEDURE — 6360000002 HC RX W HCPCS: Performed by: DENTIST

## 2018-12-25 PROCEDURE — 2580000003 HC RX 258: Performed by: DENTIST

## 2018-12-25 PROCEDURE — 85025 COMPLETE CBC W/AUTO DIFF WBC: CPT

## 2018-12-25 PROCEDURE — 80053 COMPREHEN METABOLIC PANEL: CPT

## 2018-12-25 PROCEDURE — 36592 COLLECT BLOOD FROM PICC: CPT

## 2018-12-25 PROCEDURE — 94762 N-INVAS EAR/PLS OXIMTRY CONT: CPT

## 2018-12-25 PROCEDURE — 83735 ASSAY OF MAGNESIUM: CPT

## 2018-12-25 PROCEDURE — 99231 SBSQ HOSP IP/OBS SF/LOW 25: CPT | Performed by: INTERNAL MEDICINE

## 2018-12-25 RX ORDER — HYDROCODONE BITARTRATE AND ACETAMINOPHEN 5; 325 MG/1; MG/1
1 TABLET ORAL EVERY 4 HOURS PRN
Qty: 30 TABLET | Refills: 0 | Status: SHIPPED | OUTPATIENT
Start: 2018-12-25 | End: 2018-12-30

## 2018-12-25 RX ORDER — AMOXICILLIN AND CLAVULANATE POTASSIUM 600; 42.9 MG/5ML; MG/5ML
400 POWDER, FOR SUSPENSION ORAL 3 TIMES DAILY
Status: DISCONTINUED | OUTPATIENT
Start: 2018-12-25 | End: 2018-12-25 | Stop reason: SDUPTHER

## 2018-12-25 RX ORDER — HYDROCODONE BITARTRATE AND ACETAMINOPHEN 5; 325 MG/1; MG/1
1 TABLET ORAL EVERY 6 HOURS PRN
Status: DISCONTINUED | OUTPATIENT
Start: 2018-12-25 | End: 2018-12-25 | Stop reason: HOSPADM

## 2018-12-25 RX ORDER — AMOXICILLIN AND CLAVULANATE POTASSIUM 400; 57 MG/5ML; MG/5ML
875 POWDER, FOR SUSPENSION ORAL EVERY 12 HOURS
Status: DISCONTINUED | OUTPATIENT
Start: 2018-12-25 | End: 2018-12-25 | Stop reason: HOSPADM

## 2018-12-25 RX ORDER — AMOXICILLIN AND CLAVULANATE POTASSIUM 400; 57 MG/5ML; MG/5ML
400 POWDER, FOR SUSPENSION ORAL EVERY 8 HOURS
Status: DISCONTINUED | OUTPATIENT
Start: 2018-12-25 | End: 2018-12-25

## 2018-12-25 RX ADMIN — CHLORHEXIDINE GLUCONATE 15 ML: 1.2 RINSE ORAL at 11:23

## 2018-12-25 RX ADMIN — AMPICILLIN SODIUM AND SULBACTAM SODIUM 3 G: 2; 1 INJECTION, POWDER, FOR SOLUTION INTRAMUSCULAR; INTRAVENOUS at 07:39

## 2018-12-25 RX ADMIN — AMPICILLIN SODIUM AND SULBACTAM SODIUM 3 G: 2; 1 INJECTION, POWDER, FOR SOLUTION INTRAMUSCULAR; INTRAVENOUS at 02:34

## 2018-12-25 RX ADMIN — AMOXICILLIN AND CLAVULANATE POTASSIUM 875 MG: 400; 57 POWDER, FOR SUSPENSION ORAL at 12:54

## 2018-12-25 NOTE — DISCHARGE INSTR - DIET

## 2018-12-25 NOTE — PROGRESS NOTES
24 hour chart check complete.  Electronically signed by Lexii Hurley RN on 12/24/2018 at 4:10 AM
24hr chart check completed.  Electronically signed by Jaz Saleh RN on 12/25/2018 at 4:43 AM
Cardiac rhythm, pulse ox, and PT ID and  verified with Ethan Garcia MT  Electronically signed by Rafaela Haddad RN on 2018 at 10:55 AM
Dressing has been changed that included instructions to teach the spouse. Patient had an advancement of diet for lunch including ham, steamed veggies, and mash potatos. Patient was able to eat roughly 30% with little trouble. She noticed that she had to take her time with small bites but didn't have any nausea or coughing fits. Oral pain medication offered before discharged but declined. Dr. Jack Burgos has been updated and cleared for discharge.  Electronically signed by Sarah Jackson RN on 12/25/2018 at 2:26 PM
Hospitalist Progress Note  12/24/2018 8:18 AM  Subjective:   Admit Date: 12/20/2018  PCP: No primary care provider on file. Chief Complaint: Dental pain    Subjective: Patient states swelling and pain continue to improve. Denies SOB or CP. Currently NPO for OR this AM.    ROS: 14 point review of systems is negative except as specifically addressed above.     Diet NPO, After Midnight Exceptions are: Sips with Meds  Dietary Nutrition Supplements: Diabetic Oral Supplement, Frozen Oral Supplement    Intake/Output Summary (Last 24 hours) at 12/24/18 0818  Last data filed at 12/24/18 0600   Gross per 24 hour   Intake          2520.54 ml   Output             2300 ml   Net           220.54 ml     Medications:   lactated ringers 125 mL/hr at 12/24/18 0802    dextrose       Current Facility-Administered Medications   Medication Dose Route Frequency Provider Last Rate Last Dose    potassium chloride (KLOR-CON M) extended release tablet 40 mEq  40 mEq Oral Once Eliu Kim MD        lactated ringers infusion   Intravenous Continuous Miriam Wagner  mL/hr at 12/24/18 0802      sodium chloride flush 0.9 % injection 10 mL  10 mL Intravenous 2 times per day Miriam Wagner MD        sodium chloride flush 0.9 % injection 10 mL  10 mL Intravenous PRN Miriam Wagner MD        lidocaine PF 1 % injection 1 mL  1 mL Intradermal Once PRN Miriam Wagner MD        fentaNYL (SUBLIMAZE) injection 25 mcg  25 mcg Intravenous Once PRN Miriam Wagner MD        fentaNYL (SUBLIMAZE) injection 50 mcg  50 mcg Intravenous Once PRN Miriam Wagner MD        midazolam (VERSED) injection 2 mg  2 mg Intravenous Once PRN Miriam Wagner MD        HYDROmorphone (DILAUDID) injection 0.25 mg  0.25 mg Intravenous Q5 Min PRN LarraBERNABE Horn - CRNA        HYDROmorphone (DILAUDID) injection 0.5 mg  0.5 mg Intravenous Q5 Min PRN BERNABE Corea CRNA        morphine injection 2 mg  2 mg Intravenous Q5 Min PRN Raine Haque
Hospitalist Progress Note  12/25/2018 2:37 PM  Subjective:   Admit Date: 12/20/2018  PCP: No primary care provider on file. Chief Complaint: Dental pain    Subjective: Patient states overall she feels well. No acute complaints at this time    ROS: 14 point review of systems is negative except as specifically addressed above.     Dietary Nutrition Supplements: Diabetic Oral Supplement, Frozen Oral Supplement  DIET DENTAL SOFT; Carb Control: 4 carb choices (60 gms)/meal    Intake/Output Summary (Last 24 hours) at 12/25/18 1437  Last data filed at 12/25/18 0507   Gross per 24 hour   Intake           797.27 ml   Output             4450 ml   Net         -3652.73 ml     Medications:   dextrose       Current Facility-Administered Medications   Medication Dose Route Frequency Provider Last Rate Last Dose    HYDROcodone-acetaminophen (NORCO) 5-325 MG per tablet 1 tablet  1 tablet Oral Q6H PRN Manasa Johnson, YAQUELIN        amoxicillin-clavulanate (AUGMENTIN) 400-57 MG/5ML suspension 875 mg  875 mg Oral Q12H Manasa Johnson DMD   875 mg at 12/25/18 1254    sodium chloride flush 0.9 % injection 10 mL  10 mL Intravenous 2 times per day Kristi Stout MD        sodium chloride flush 0.9 % injection 10 mL  10 mL Intravenous PRN Kristi Stout MD        morphine injection 2 mg  2 mg Intravenous Q5 Min PRN BERNABE Villa - CRNA   2 mg at 12/24/18 1448    morphine injection 4 mg  4 mg Intravenous Q5 Min PRN Damaris Villeda APRJOSUÉ - CRNA        sodium chloride flush 0.9 % injection 10 mL  10 mL Intravenous 2 times per day Manasa Johnson DMD   Stopped at 12/24/18 1309    sodium chloride flush 0.9 % injection 10 mL  10 mL Intravenous PRN Manasa Johnson DMD        chlorhexidine (PERIDEX) 0.12 % solution 15 mL  15 mL Mouth/Throat TID Manasa Johnson DMD   15 mL at 12/25/18 1123    LORazepam (ATIVAN) injection 1 mg  1 mg Intravenous Q4H PRN Manasa Johnson DMD   1 mg at 12/24/18 1147    insulin lispro (HUMALOG) injection vial
In room at 1120.  Handoff to tyrone at [phone]
Infectious Diseases Progress Note    Patient:  Hector Major  YOB: 1985  MRN: [id number]   Admit date: 12/20/2018   Admitting Physician: Stan Rivera DMD  Primary Care Physician: No primary care provider on file. Chief Complaint/Interval History:  She is without fever. She feels swelling of the right mandibular area is somewhat worse. She has been hemodynamically stable. She is without rash or skin itching on antibiotic treatment. In/Out    Intake/Output Summary (Last 24 hours) at 12/22/18 0992  Last data filed at 12/22/18 0311   Gross per 24 hour   Intake              240 ml   Output                0 ml   Net              240 ml     Allergies: Allergies   Allergen Reactions    Oxycodone-Acetaminophen Rash     Current Meds:   LORazepam (ATIVAN) injection 1 mg Q4H PRN   insulin lispro (HUMALOG) injection vial 0-6 Units TID WC   insulin lispro (HUMALOG) injection vial 0-3 Units Nightly   sodium chloride flush 0.9 % injection 10 mL PRN   0.9 % sodium chloride infusion Continuous   morphine injection 2 mg Q2H PRN   Or    morphine injection 4 mg Q2H PRN   glucose (GLUTOSE) 40 % oral gel 15 g PRN   dextrose 50 % solution 12.5 g PRN   glucagon (rDNA) injection 1 mg PRN   dextrose 5 % solution PRN   ampicillin-sulbactam (UNASYN) 3 g ivpb minibag Q6H   chlorhexidine (PERIDEX) 0.12 % solution 15 mL BID   promethazine (PHENERGAN) injection 25 mg Q4H PRN   acetaminophen (TYLENOL) 160 MG/5ML solution 650 mg Q4H PRN   sodium chloride flush 0.9 % injection 10 mL 2 times per day   sodium chloride flush 0.9 % injection 10 mL PRN   ondansetron (ZOFRAN) injection 4 mg Q6H PRN     Review of Systems  she is breathing comfortably. No shortness of breath. No nausea or diarrhea. No pain at IV site.     VitalSigns:  BP (!) 137/91   Pulse 103   Temp 96.2 °F (35.7 °C) (Temporal)   Resp 18   Ht 5' 5\" (1.651 m)   Wt 219 lb 9.6 oz (99.6 kg)   LMP 10/03/2018 (Exact Date)   SpO2 96%   BMI 36.54 kg/m²     Physical Exam
Karine Lock transferred to [phone] from [phone] via wheelchair. Reason for transfer: lower level of care   Explained reason for transfer to Patient and Family. Belongings: Clothing with patient at bedside . Soft chart transferred with patient: Yes. Telemetry box number N/A transferred with patient: N/A. Report given to: Ivan Roy, via telephone.       Electronically signed by John Gilbert RN on 12/21/2018 at 10:55 AM
Length of Stay:   LOS: 1 day      Chief complaint:  Chief Complaint   Patient presents with    Dental Pain     right lower tooth pain         Subjective:  Much improved breathing, swallowing    Physical Examination:  BP (!) 131/93   Pulse 124   Temp 97.6 °F (36.4 °C) (Temporal)   Resp 21   Ht 5' 5\" (1.651 m)   Wt 219 lb 9.6 oz (99.6 kg)   LMP 10/03/2018 (Exact Date)   SpO2 96%   BMI 36.54 kg/m²   Constitutional - Appropriately groomed, good nutritional status  Psychiatric - AOX3, baseline mood  Eyes - EOMI, conjunctivae and lids intact  ENMT - wound well packed  Respiratory - CTAB, no accessory muscle use  Cardiovascular - Clear S1, S2 no gross m/r/g; pedal pulses intact  Abd - Soft, non-tender; No gross hernia  MSK - UE and LE joints intact, no gross clubbing  Skin - No rashes or wounds; no gross capillary refill defects  Neurologic - CN 2-12 grossly intact, sensation intact    Medications:  insulin lispro, 0-12 Units, Subcutaneous, Q4H    ampicillin-sulbactam, 3 g, Intravenous, Q6H    chlorhexidine, 15 mL, Mouth/Throat, BID    sodium chloride flush, 10 mL, Intravenous, 2 times per day      Problem list:  Active Problems:    Sarath's angina    Type 2 diabetes mellitus ([facility] [address]  Resolved Problems:    * No resolved hospital problems.  *        A/P:  []Ludwigs angina  -S/p I&D by oral surgery  -Pt doing well post-op  -Continue IV abs per ID  -swallowing well, breathing well    []DM2  -Continue SSI      []Hospital Issues  DVT prop - SCDs  Code - FULL  Disposition - Continue floor
Message left with PICC nurse about placement. Pending response.  Electronically signed by Taylor Musa RN on 12/24/2018 at 1:02 PM
Mrs. Chandrakant Negron has had increased swelling to her neck and tongue, and difficulty swallowing, but denies difficulty breathing. I notified Dr. Edwardo Goyal who was on call for his brother Dr. Yovana Boucher who performed her surgery. He said his brother would be by later this morning and to monitor her airway, and to call back if she had any difficulty breathing.  Electronically signed by Jason Hardy RN on 12/22/2018 at 7:39 AM
Notified surgical desk that the urine specimen for the pregnancy had just been collected and delivered to lab. She stated they did need it but maybe able to do a serum. Transport is already at bedside. Patient's insulin pump, glasses, telemetry, and clothing at bedside. Family has been contacted by telephone.  Electronically signed by Minus Mandy, RN on 12/24/2018 at 7:49 AM
Oral & Maxillofacial Surgery     Celester Contes   Date: December 21, 2018     Admit Date:  12/20/2018  Post Op Day # 1  Subjective:  Resting in bed with some degree of apprehension due to facial / oral swelling. Objective: Moderate dysphagia, no dyspnea. Vitals:    12/21/18 1056   BP: 136/88   Pulse: 113   Resp: 18   Temp: 97.2 °F (36.2 °C)   SpO2: 95%     Blood pressure 136/88, pulse 113, temperature 97.2 °F (36.2 °C), temperature source Temporal, resp. rate 18, height 5' 5\" (1.651 m), weight 219 lb 9.6 oz (99.6 kg), last menstrual period 10/03/2018, SpO2 95 %. Face/Oral:   Facial / submandibular / oral swelling holding without increase and possibly decreased. Drains producing extra oral.  Lungs: clear to auscultation bilateral u/l lobes. Ext: Full range of movement and palpable pulses. CBC:   Lab Results   Component Value Date    WBC 16.7 12/21/2018    RBC 4.27 12/21/2018    HGB 11.3 12/21/2018    HCT 36.2 12/21/2018    MCV 84.8 12/21/2018    MCH 26.5 12/21/2018    MCHC 31.2 12/21/2018    RDW 15.7 12/21/2018     12/21/2018    MPV 9.7 12/21/2018        Assessment:  Stable pod # 1. Patient Active Problem List   Diagnosis    Sarath's angina       Plan:  Increase use of heat to drain sites as well as wound toilet. Ct soft tissue neck to evaluate airway. Increase clear liquid intake  Increase ambulation. Increase peridex rinse use.     Time spent on counseling/coordination of care: 45 Minutes  Total time spent with patient: YAQUELIN Pickard  1:59 PM  12/21/2018
Oral & Maxillofacial Surgery     Gabi Richardson   Date: December 21, 2018     Admit Date:  12/20/2018  Post Op Day # 1  Subjective:  Resting comfortably in bed. No complaint of pain. Objective:  Afebrile, vital signs stable. No dyspnea and moderate dysphagia. Vitals:    12/21/18 0500   BP: (!) 148/80   Pulse: 91   Resp: 23   Temp:    SpO2: 95%     Blood pressure (!) 148/80, pulse 91, temperature 97.8 °F (36.6 °C), temperature source Temporal, resp. rate 23, height 5' 5\" (1.651 m), weight 219 lb 9.6 oz (99.6 kg), last menstrual period 10/03/2018, SpO2 95 %. Face/Oral:    Facial edema decreased. Intra oral edema decreased. Extra oral drain sites producing  Intra oral drainage decreased. Wound sites clean and in tact. Drains in tact. CBC:   Lab Results   Component Value Date    WBC 16.7 12/21/2018    RBC 4.27 12/21/2018    HGB 11.3 12/21/2018    HCT 36.2 12/21/2018    MCV 84.8 12/21/2018    MCH 26.5 12/21/2018    MCHC 31.2 12/21/2018    RDW 15.7 12/21/2018     12/21/2018    MPV 9.7 12/21/2018        Assessment:  Stable pod # 1    Patient Active Problem List   Diagnosis    Sarath's angina       Plan:  Transfer to [facility] / 5th floor. Continue unasyn ivp. Continue pain meds. Continue wound toilet. Ambulate and clear liquids.       Time spent on counseling/coordination of care: 30 Minutes  Total time spent with patient: Lucian La DMD  6:03 AM  12/21/2018
Patient condition improving. Ice therapy utilized, and swelling in neck and tongue has decreased. Patient Robindayanna Yangluiz, taught to utilize yonker to suction side pockets of mouth and request assistance to suction toward throat area. Patient can move tongue around, and clearly recited the \"Pledge of Allegiance\". Patient continues to advise that pain at a manageable level.
Pilar Caraballo transferred to [phone] from [phone] via wheelchair  Reason for transfer: medically stable, no longer requiring continuous CCU monitoring  Explained reason for transfer to Patient and Family. Belongings with patient at bedside . Soft chart transferred with patient: Yes. Telemetry box number: SP 2, transferred with patient: yes. Report given to: Chao Huerta, via telephone.       Electronically signed by Sapna Santos RN on 12/20/2018 at 10:35 AM
Pt continuous to be nauseated after zofran administered. Dr Kezia Rice at this time.  Awaiting call back or further orders  Electronically signed by Ciaran Regan RN on 12/20/2018 at 9:26 AM
Reviewed Dr. Kian Glez note regarding antibiotics but the possibility of an additional medication is to be determined by culture. Considering discharge plans and the holiday schedule if there is a need for a line placement will need to be done today so message has been left with  to determine how to proceed.  Electronically signed by Chanell Wilde RN on 12/24/2018 at 8:10 AM
injection 1 mg  1 mg Intramuscular PRN Marie Morrissey MD        dextrose 5 % solution  100 mL/hr Intravenous PRN Marie Morrissey MD        ampicillin-sulbactam (UNASYN) 3 g ivpb minibag  3 g Intravenous Q6H Doug Polio, DMD   Stopped at 12/22/18 1055    chlorhexidine (PERIDEX) 0.12 % solution 15 mL  15 mL Mouth/Throat BID Doug Polio, DMD   15 mL at 12/22/18 1011    promethazine (PHENERGAN) injection 25 mg  25 mg Intramuscular Q4H PRN Doug Quirogao, DMD   25 mg at 12/20/18 1009    acetaminophen (TYLENOL) 160 MG/5ML solution 650 mg  650 mg Oral Q4H PRN Ok Esparza MD        sodium chloride flush 0.9 % injection 10 mL  10 mL Intravenous 2 times per day Doug Polio, DMD   10 mL at 12/21/18 1480    sodium chloride flush 0.9 % injection 10 mL  10 mL Intravenous PRN Doug Quirogao, DMD        ondansetron [facility]) injection 4 mg  4 mg Intravenous Q6H PRN Doug Quirogao, DMD   4 mg at 12/22/18 0048        Labs:     Recent Labs      12/20/18   0032  12/21/18   0123  12/22/18   0223   WBC  19.1*  16.7*  13.7*   RBC  4.97  4.27  3.80*   HGB  13.2  11.3*  10.3*   HCT  41.1  36.2*  32.2*   MCV  82.7  84.8  84.7   MCH  26.6*  26.5*  27.1   MCHC  32.1*  31.2*  32.0*   PLT  371  349  359     Recent Labs      12/20/18   2103  12/21/18   0123  12/22/18   0609   NA  137  138  139   K  3.5  3.4*  3.4*   ANIONGAP  20*  15  16   CL  101  104  104   CO2  16*  19*  19*   BUN  6  5*  6   CREATININE  0.5  0.5  <0.5   GLUCOSE  172*  180*  81   CALCIUM  8.5*  8.5*  8.3*     Recent Labs      12/22/18   0609   MG  1.8     Recent Labs      12/20/18   0032  12/21/18   0123  12/22/18   0609   AST  27  19  18   ALT  33  21  18   BILITOT  0.4  0.4  <0.2   ALKPHOS  148*  123*  110*     ABGs:No results for input(s): PHART, OLQ8MFQ, PO2ART, VLR2MKC, BEART, HGBAE, G7ZTVDQM, CARBOXHGBART, 02THERAPY in the last 72 hours.   HgBA1c:   Recent Labs      12/20/18   0547   LABA1C  7.9*     FLP:  No results found for: TRIG, HDL, LDLCALC,
when necessary    Michelle Cunningham MD

## 2018-12-25 NOTE — PLAN OF CARE
Problem: Falls - Risk of:  Goal: Will remain free from falls  Will remain free from falls   Outcome: Ongoing    Goal: Absence of physical injury  Absence of physical injury   Outcome: Ongoing      Problem: Pain:  Goal: Pain level will decrease  Pain level will decrease   Outcome: Ongoing    Goal: Control of acute pain  Control of acute pain   Outcome: Ongoing    Goal: Control of chronic pain  Control of chronic pain   Outcome: Ongoing      Problem: Skin Integrity:  Goal: Absence of new skin breakdown  Absence of new skin breakdown   Outcome: Ongoing

## 2018-12-28 LAB
ANAEROBIC CULTURE: ABNORMAL
CULTURE SURGICAL: ABNORMAL
CULTURE SURGICAL: ABNORMAL
GRAM STAIN RESULT: ABNORMAL
ORGANISM: ABNORMAL

## 2019-02-22 ENCOUNTER — OFFICE VISIT (OUTPATIENT)
Dept: ENDOCRINOLOGY | Facility: CLINIC | Age: 34
End: 2019-02-22

## 2019-02-22 VITALS
SYSTOLIC BLOOD PRESSURE: 130 MMHG | WEIGHT: 216.4 LBS | HEART RATE: 96 BPM | DIASTOLIC BLOOD PRESSURE: 70 MMHG | HEIGHT: 64 IN | BODY MASS INDEX: 36.95 KG/M2

## 2019-02-22 DIAGNOSIS — E66.9 OBESITY (BMI 30-39.9): ICD-10-CM

## 2019-02-22 DIAGNOSIS — E55.9 VITAMIN D DEFICIENCY: ICD-10-CM

## 2019-02-22 DIAGNOSIS — IMO0002 DIABETES MELLITUS TYPE 1, UNCONTROLLED, WITH COMPLICATIONS: Primary | ICD-10-CM

## 2019-02-22 PROCEDURE — 99214 OFFICE O/P EST MOD 30 MIN: CPT | Performed by: NURSE PRACTITIONER

## 2019-02-22 NOTE — PROGRESS NOTES
Subjective    Carey Medina is a 33 y.o. female. she is here today for follow-up.    History of Present Illness       Duration/Timing:Diabetes mellitus type 1, Age at onset of diabetes : 21 years, Onset of symptoms gradual        Timing - constant        Quality - controlled since pump        Severity - moderate      alleviating factors - intelligence       Aggravating factors - none            Severity (Complications/Hospitalizations)  Secondary Macrovascular Complications:  No CAD, No CVA, No PAD  Secondary Microvascular Complications:  No Diabetic Nephropathy, No Diabetic Retinopathy, No Diabetic Neuropathy        Context  Diabetes Regimen:Insulin, Not on Oral Medications, Compliant with regimen, last hgbA1c 7.1 % Oct. 2016         Blood Glucose Readings      Medtronic  insulin pump     Insulin pump 150           Exercise:  Exercises        Associated Signs/Symptoms  Hyperglycemic Symptoms:  No polyuria, No polydipsia, No polyphagia  Hypoglycemic Episodes:No documented hypoglycemia               The following portions of the patient's history were reviewed and updated as appropriate:   Past Medical History:   Diagnosis Date   • Female infertility    • Irregular periods    • Obesity    • Oligomenorrhea    • Polycystic ovaries    • Proteinuria    • Type 1 diabetes mellitus (CMS/HCC)    • Upper respiratory infection    • Visit for gynecologic examination 01/30/2015   • Vitamin D deficiency      Past Surgical History:   Procedure Laterality Date   • LAPAROSCOPY FOR ECTOPIC PREGNANCY       Family History   Problem Relation Age of Onset   • Diabetes Mother    • Diabetes Maternal Aunt    • Hypertension Maternal Grandmother    • Diabetes Maternal Grandmother    • Osteoporosis Other      OB History     No data available        Current Outpatient Medications   Medication Sig Dispense Refill   • BD ULTRA-FINE LANCETS lancets 33 gauge. Use 4 times daily 120 each 11   • glucagon (GLUCAGON EMERGENCY) 1 MG injection  "Inject 1 mg into the shoulder, thigh, or buttocks 1 (One) Time As Needed for low blood sugar (use for low blood sugar) for up to 1 dose. 1 kit 11   • glucose blood (CHICO CONTOUR NEXT TEST) test strip Use as instructed - test 5 times daily 450 each 11   • insulin aspart (novoLOG) 100 UNIT/ML injection 120 units Inject through insulin pump 5 each 11   • Insulin Pen Needle (PEN NEEDLES 5/16\") 31G X 8 MM misc      • glucagon (GLUCAGON EMERGENCY) 1 MG injection Inject 1 mg under the skin into the appropriate area as directed 1 (One) Time As Needed for Low Blood Sugar for up to 1 dose. 1 kit 12     No current facility-administered medications for this visit.      Allergies   Allergen Reactions   • Percocet [Oxycodone-Acetaminophen]      Social History     Socioeconomic History   • Marital status:      Spouse name: Not on file   • Number of children: Not on file   • Years of education: Not on file   • Highest education level: Not on file   Tobacco Use   • Smoking status: Never Smoker   • Smokeless tobacco: Never Used   Substance and Sexual Activity   • Alcohol use: No   • Drug use: No   • Sexual activity: Yes     Partners: Male     Comment:  to Rajesh 8 years       Review of Systems  Review of Systems   Constitutional: Negative for activity change, appetite change, diaphoresis and fatigue.   HENT: Negative for facial swelling, sneezing, sore throat, tinnitus, trouble swallowing and voice change.    Eyes: Negative for photophobia, pain, discharge, redness, itching and visual disturbance.   Respiratory: Negative for apnea, cough, choking, chest tightness and shortness of breath.    Cardiovascular: Negative for chest pain, palpitations and leg swelling.   Gastrointestinal: Negative for abdominal distention, abdominal pain, constipation, diarrhea, nausea and vomiting.   Endocrine: Negative for cold intolerance, heat intolerance, polydipsia, polyphagia and polyuria.   Genitourinary: Negative for difficulty " "urinating, dysuria, frequency, hematuria and urgency.   Musculoskeletal: Negative for arthralgias, back pain, gait problem, joint swelling, myalgias, neck pain and neck stiffness.   Skin: Negative for color change, pallor, rash and wound.   Neurological: Negative for dizziness, tremors, weakness, light-headedness, numbness and headaches.   Hematological: Negative for adenopathy. Does not bruise/bleed easily.   Psychiatric/Behavioral: Negative for behavioral problems, confusion and sleep disturbance.        Objective    /70   Pulse 96   Ht 162.6 cm (64\")   Wt 98.2 kg (216 lb 6.4 oz)   BMI 37.14 kg/m²   Physical Exam   Constitutional: She is oriented to person, place, and time. She appears well-developed and well-nourished. No distress.   HENT:   Head: Normocephalic and atraumatic.   Right Ear: External ear normal.   Left Ear: External ear normal.   Nose: Nose normal.   Eyes: Conjunctivae and EOM are normal. Pupils are equal, round, and reactive to light.   Neck: Normal range of motion. Neck supple. No tracheal deviation present. No thyromegaly present.   Cardiovascular: Normal rate, regular rhythm and normal heart sounds.   No murmur heard.  Pulmonary/Chest: Effort normal and breath sounds normal. No respiratory distress. She has no wheezes.   Abdominal: Soft. Bowel sounds are normal. There is no tenderness. There is no rebound and no guarding.   Musculoskeletal: Normal range of motion. She exhibits no edema, tenderness or deformity.   Neurological: She is alert and oriented to person, place, and time. No cranial nerve deficit.   Skin: Skin is warm and dry. No rash noted.   Psychiatric: She has a normal mood and affect. Her behavior is normal. Judgment and thought content normal.       Lab Review  Glucose (mg/dl)   Date Value   04/16/2015 211 (H)   11/04/2014 160 (H)     Sodium (mmol/L)   Date Value   04/16/2015 136 (L)   11/04/2014 138     Potassium (mmol/L)   Date Value   04/16/2015 4.2   11/04/2014 4.5 " "    Chloride (mmol/L)   Date Value   04/16/2015 99   11/04/2014 102     CO2 (mmol/L)   Date Value   04/16/2015 24   11/04/2014 24     BUN (mg/dl)   Date Value   04/16/2015 10   11/04/2014 8     Creatinine (mg/dl)   Date Value   04/16/2015 0.7   11/04/2014 0.6     Hemoglobin A1C (%TotHgb)   Date Value   04/16/2015 7.3 (H)   11/04/2014 6.2 (H)     Triglycerides (mg/dl)   Date Value   11/04/2014 112     LDL Cholesterol  (mg/dl)   Date Value   11/04/2014 110       Assessment/Plan      1. Diabetes mellitus type 1, uncontrolled, with complications (CMS/[facility])    2. Vitamin D deficiency    3. Obesity (BMI 30-39.9)    .    Medications prescribed:  Outpatient Encounter Medications as of 2/22/2019   Medication Sig Dispense Refill   • BD ULTRA-FINE LANCETS lancets 33 gauge. Use 4 times daily 120 each 11   • glucagon (GLUCAGON EMERGENCY) 1 MG injection Inject 1 mg into the shoulder, thigh, or buttocks 1 (One) Time As Needed for low blood sugar (use for low blood sugar) for up to 1 dose. 1 kit 11   • glucose blood (CHICO CONTOUR NEXT TEST) test strip Use as instructed - test 5 times daily 450 each 11   • insulin aspart (novoLOG) 100 UNIT/ML injection 120 units Inject through insulin pump 5 each 11   • Insulin Pen Needle (PEN NEEDLES 5/16\") 31G X 8 MM misc      • [DISCONTINUED] BD ULTRA-FINE LANCETS lancets by Other route 4 (four) times a day. 33 gauge. Use as instructed     • [DISCONTINUED] glucose blood (CHICO CONTOUR NEXT TEST) test strip Use as instructed - test 5 times daily 450 each 11   • [DISCONTINUED] insulin aspart (novoLOG) 100 UNIT/ML injection 120 units Inject through insulin pump 5 each 11   • glucagon (GLUCAGON EMERGENCY) 1 MG injection Inject 1 mg under the skin into the appropriate area as directed 1 (One) Time As Needed for Low Blood Sugar for up to 1 dose. 1 kit 12     No facility-administered encounter medications on file as of 2/22/2019.        Orders placed during this encounter include:  Orders Placed This " Encounter   Procedures   • Comprehensive Metabolic Panel     Standing Status:   Future     Standing Expiration Date:   2/22/2020   • Hemoglobin A1c     Standing Status:   Future     Standing Expiration Date:   2/22/2020   • Protein / Creatinine Ratio, Urine - Urine, Clean Catch     Standing Status:   Future     Standing Expiration Date:   2/22/2020   • Microalbumin / Creatinine Urine Ratio - Urine, Clean Catch     Standing Status:   Future     Standing Expiration Date:   2/22/2020   • TSH     Standing Status:   Future     Standing Expiration Date:   2/22/2020   • Vitamin B12     Standing Status:   Future     Standing Expiration Date:   2/22/2020   • Vitamin D 25 Hydroxy     Standing Status:   Future     Standing Expiration Date:   2/22/2020   • CBC & Differential     Standing Status:   Future     Standing Expiration Date:   2/22/2020     Order Specific Question:   Manual Differential     Answer:   No     Glycemic Management              Lab Results   Component Value Date     HGBA1C 7.3 (H) 04/16/2015             medtronic insulin pump        BASAL  -      [place] - 1.85      Carb ratio     8.2      Sensitivity     25               Lipid Management       Not on statin       Nov. 2014    LDL - 110         Blood Pressure Management: not on ace inhibitor        Not on lisinopril due to trying to get pregnant       Rechecked bp in office was 132/86       Will follow up with primary if remains elevated       Microvascular Complication Monitoring       Last eye exam 2016      no neuropathy      Immunizations: last influenza immunization 11-13 , last pneumococcal immunization 11-13  States will not take the flu shot again   Weight Related:Obesity        Following with Dr. Reddy       Will be seeing fertility specialist in [facility]        March 2016       Vit d - 14       States cannot take vitamin makes sick      try a multivitamin with vitamin d - takes daily      Other Diabetes Related Aspects        March 2016       TSH - nl             4. Follow-up: Return in about 4 months (around 6/22/2019) for Recheck.

## 2019-07-26 ENCOUNTER — OFFICE VISIT (OUTPATIENT)
Dept: OBSTETRICS AND GYNECOLOGY | Facility: CLINIC | Age: 34
End: 2019-07-26

## 2019-07-26 VITALS
HEIGHT: 64 IN | BODY MASS INDEX: 39.61 KG/M2 | DIASTOLIC BLOOD PRESSURE: 82 MMHG | WEIGHT: 232 LBS | SYSTOLIC BLOOD PRESSURE: 130 MMHG

## 2019-07-26 DIAGNOSIS — N91.2 AMENORRHEA: ICD-10-CM

## 2019-07-26 DIAGNOSIS — Z01.419 WOMEN'S ANNUAL ROUTINE GYNECOLOGICAL EXAMINATION: Primary | ICD-10-CM

## 2019-07-26 DIAGNOSIS — Z87.42 HISTORY OF PCOS: ICD-10-CM

## 2019-07-26 PROCEDURE — 88141 CYTOPATH C/V INTERPRET: CPT | Performed by: PATHOLOGY

## 2019-07-26 PROCEDURE — 87624 HPV HI-RISK TYP POOLED RSLT: CPT | Performed by: NURSE PRACTITIONER

## 2019-07-26 PROCEDURE — 99395 PREV VISIT EST AGE 18-39: CPT | Performed by: NURSE PRACTITIONER

## 2019-07-26 PROCEDURE — 88142 CYTOPATH C/V THIN LAYER: CPT | Performed by: NURSE PRACTITIONER

## 2019-07-26 RX ORDER — MEDROXYPROGESTERONE ACETATE 10 MG/1
10 TABLET ORAL DAILY
Qty: 10 TABLET | Refills: 0 | Status: SHIPPED | OUTPATIENT
Start: 2019-07-26 | End: 2019-11-15

## 2019-07-26 NOTE — PROGRESS NOTES
Subjective   Carey Medina is a 33 y.o. Annual gyn exam c/o dysmenorrhea and inability to conceive    LMP: 10/2018  Pap: 2015, negative     PMH: [id number], endometriosis, PCOS.    Pt reports she has been trying to conceive since , she has been seen by reproductive endocrinology. She has a history of two early spontaneous abortions and two ectopic pregnancies which required bilateral salpingectomy. She rarely has a period and when she does they are heavy and painful.  Pt has considered hysterectomy for her pain, but is unsure if she is ready to completely be unable to carry her own child.        Gynecologic Exam   The patient's primary symptoms include missed menses. The patient's pertinent negatives include no genital itching, genital lesions, genital odor, genital rash, pelvic pain, vaginal bleeding or vaginal discharge. This is a recurrent problem. The current episode started more than 1 year ago. The problem has been unchanged. The patient is experiencing no pain. She is not pregnant. Associated symptoms include painful intercourse. Pertinent negatives include no abdominal pain, anorexia, back pain, chills, constipation, diarrhea, discolored urine, dysuria, fever, flank pain, frequency, headaches, hematuria, joint pain, joint swelling, nausea, rash, sore throat, urgency or vomiting. The symptoms are aggravated by intercourse. She has tried NSAIDs for the symptoms. The treatment provided mild relief. She is sexually active. No, her partner does not have an STD. She uses nothing for contraception. Her menstrual history has been irregular. Her past medical history is significant for an ectopic pregnancy, endometriosis, a gynecological surgery, metrorrhagia, miscarriage and ovarian cysts. There is no history of an abdominal surgery, a  section, herpes simplex, menorrhagia, perineal abscess, PID, an STD, a terminated pregnancy or vaginosis.       The following portions of the patient's  history were reviewed and updated as appropriate: allergies, current medications, past family history, past medical history, past social history, past surgical history and problem list.    Review of Systems   Constitutional: Negative for chills, fatigue, fever and unexpected weight change.   HENT: Negative for sore throat.    Respiratory: Negative for apnea, chest tightness and shortness of breath.    Cardiovascular: Negative for chest pain and palpitations.   Gastrointestinal: Negative for abdominal distention, abdominal pain, anorexia, constipation, diarrhea, nausea and vomiting.   Genitourinary: Positive for dyspareunia, menstrual problem and missed menses. Negative for decreased urine volume, difficulty urinating, dysuria, enuresis, flank pain, frequency, genital sores, hematuria, menorrhagia, pelvic pain, urgency, vaginal bleeding, vaginal discharge and vaginal pain.   Musculoskeletal: Negative for back pain and joint pain.   Skin: Negative for rash.   Neurological: Negative for headaches.   Psychiatric/Behavioral: Negative for sleep disturbance.       Objective   Physical Exam   Constitutional: She is oriented to person, place, and time. She appears well-developed and well-nourished.   Neck: No thyromegaly present.   Cardiovascular: Normal rate, regular rhythm, normal heart sounds and intact distal pulses.   Pulmonary/Chest: Effort normal and breath sounds normal. Right breast exhibits no inverted nipple, no mass, no nipple discharge, no skin change and no tenderness. Left breast exhibits no inverted nipple, no mass, no nipple discharge, no skin change and no tenderness. Breasts are symmetrical.   Abdominal: Soft. Bowel sounds are normal. She exhibits no distension. There is no tenderness.   Genitourinary: Vagina normal and uterus normal. No breast discharge or bleeding. Pelvic exam was performed with patient supine. There is no rash, tenderness, lesion or injury on the right labia. There is no rash,  tenderness, lesion or injury on the left labia.   Genitourinary Comments: Adnexa non-palpable 2/2 body habitus, pap obtained   Lymphadenopathy:     She has no axillary adenopathy.        Right: No inguinal adenopathy present.        Left: No inguinal adenopathy present.   Neurological: She is alert and oriented to person, place, and time. GCS eye subscore is 4. GCS verbal subscore is 5. GCS motor subscore is 6.   Skin: Skin is warm, dry and intact.   Psychiatric: She has a normal mood and affect. Her speech is normal and behavior is normal.   Nursing note and vitals reviewed.        Assessment/Plan   Carey was seen today for gynecologic exam and menstrual problem.    Diagnoses and all orders for this visit:    Encounter for Papanicolaou smear of cervix  -     Liquid-based Pap Smear, Screening    Amenorrhea  -     US Non-ob Transvaginal; Future  -     CBC (No Diff); Future  -     Comprehensive Metabolic Panel  -     TSH    History of PCOS  -     US Non-ob Transvaginal; Future  -     CBC (No Diff); Future  -     Comprehensive Metabolic Panel  -     TSH    Other orders  -     medroxyPROGESTERone (PROVERA) 10 MG tablet; Take 1 tablet by mouth Daily.      Pt educated and encouraged to continue monthly SBEs.  She will receive normal pap letter via mail, if abnormal we will call her.  TVUS to r/o anatomical abnormalities.  More than likely pt amenorrhea is related to PCOS and uncontrolled diabetes.  Pt educated on PCOS treatment which includes weight loss and hormonal treatment.  Per pt preference placed order for TVUS and surgical consultation with physician.  RBA Provera for withdrawal bleed.

## 2019-07-30 LAB
GEN CATEG CVX/VAG CYTO-IMP: NORMAL
LAB AP CASE REPORT: NORMAL
LAB AP GYN ADDITIONAL INFORMATION: NORMAL
LAB AP GYN OTHER FINDINGS: NORMAL
PATH INTERP SPEC-IMP: NORMAL
STAT OF ADQ CVX/VAG CYTO-IMP: NORMAL

## 2019-08-01 LAB — HPV I/H RISK 4 DNA CVX QL PROBE+SIG AMP: NEGATIVE

## 2019-09-12 DIAGNOSIS — N91.2 AMENORRHEA: ICD-10-CM

## 2019-09-12 DIAGNOSIS — Z87.42 HISTORY OF PCOS: ICD-10-CM

## 2019-09-13 ENCOUNTER — OFFICE VISIT (OUTPATIENT)
Dept: OBSTETRICS AND GYNECOLOGY | Facility: CLINIC | Age: 34
End: 2019-09-13

## 2019-09-13 VITALS
DIASTOLIC BLOOD PRESSURE: 76 MMHG | HEIGHT: 64 IN | BODY MASS INDEX: 39.09 KG/M2 | SYSTOLIC BLOOD PRESSURE: 123 MMHG | WEIGHT: 229 LBS

## 2019-09-13 DIAGNOSIS — E28.2 PCOS (POLYCYSTIC OVARIAN SYNDROME): ICD-10-CM

## 2019-09-13 DIAGNOSIS — IMO0002 DIABETES MELLITUS TYPE 1, UNCONTROLLED, WITH COMPLICATIONS: Primary | ICD-10-CM

## 2019-09-13 DIAGNOSIS — N91.4 SECONDARY OLIGOMENORRHEA: ICD-10-CM

## 2019-09-13 DIAGNOSIS — E66.9 OBESITY (BMI 30-39.9): ICD-10-CM

## 2019-09-13 PROCEDURE — 99213 OFFICE O/P EST LOW 20 MIN: CPT | Performed by: OBSTETRICS & GYNECOLOGY

## 2019-09-17 ENCOUNTER — TELEPHONE (OUTPATIENT)
Dept: OBSTETRICS AND GYNECOLOGY | Facility: CLINIC | Age: 34
End: 2019-09-17

## 2019-09-17 NOTE — TELEPHONE ENCOUNTER
Tried to call pt with no response. Left vm for her to return my call. TVUS non-remarkable.    Uterus 85.15 cc-wnl  Endometrium 6.6 mm  Cervix normal appearance  No fluid in cul de sac  Bilateral ovaries normal appearance

## 2019-09-18 NOTE — PROGRESS NOTES
Carey Medina is a 33 y.o. y/o female.     Chief Complaint: PCOS and amenorrhea    HPI:   33 y.o. .  No LMP recorded. Patient is not currently having periods (Reason: Other)..  Is a 33-year-old -0-4-0 is undergone several rounds of IVF that have been unsuccessful.  Has a long history of PCOS and rarely has menstrual periods.  Patient is incorrectly concerned about endometrial hyperplasia given her hyperestrogenic state and lack of menstruation.  Patient here to discuss possible hysterectomy to prevent endometrial cancer.  Discussed with patient that there are other options that may be less invasive to help prevent cancer from occurring.  Patient states that she does have periods when she is on medication however she does not like taking medication if she does not have to.  Discussed with patient options for treatment to include Mirena IUD, Depo-Provera, and possible OCPs.  Explained to patient that I did not feel that surgery was the best option at this moment however may be in the future.  Patient is interested in possible Mirena IUD, going to think about different options.  Also recommended that patient undergo endometrial biopsy to evaluate for any hyperplasia that may be starting, given that she is not having any bleeding I feel that this is unlikely at this time.     Review of Systems   Constitutional: Negative for chills, fatigue and fever.   HENT: Negative for sore throat.    Eyes: Negative for visual disturbance.   Respiratory: Negative for cough, shortness of breath and wheezing.    Cardiovascular: Negative for chest pain, palpitations and leg swelling.   Gastrointestinal: Negative for abdominal pain, diarrhea, nausea and vomiting.   Genitourinary: Positive for menstrual problem. Negative for dysuria, flank pain, frequency, vaginal bleeding, vaginal discharge and vaginal pain.   Neurological: Negative for syncope, light-headedness and headaches.   Psychiatric/Behavioral: Negative for  "dysphoric mood and suicidal ideas. The patient is not nervous/anxious.         The following portions of the patient's history were reviewed and updated as appropriate: allergies, current medications, past family history, past medical history, past social history, past surgical history and problem list.    Allergies   Allergen Reactions   • Percocet [Oxycodone-Acetaminophen] Hives and Itching        Prior to Admission medications    Medication Sig Start Date End Date Taking? Authorizing Provider   BD ULTRA-FINE LANCETS lancets 33 gauge. Use 4 times daily 2/22/19  Yes Ashvin Joaquin APRN   glucagon (GLUCAGON EMERGENCY) 1 MG injection Inject 1 mg into the shoulder, thigh, or buttocks 1 (One) Time As Needed for low blood sugar (use for low blood sugar) for up to 1 dose. 11/14/16  Yes Ashvin Joaquin APRN   glucagon (GLUCAGON EMERGENCY) 1 MG injection Inject 1 mg under the skin into the appropriate area as directed 1 (One) Time As Needed for Low Blood Sugar for up to 1 dose. 2/22/19  Yes Ashvin Joaquin APRN   glucose blood (CHICO CONTOUR NEXT TEST) test strip Use as instructed - test 5 times daily 2/22/19  Yes Ashvin Joaquin APRN   insulin aspart (novoLOG) 100 UNIT/ML injection 120 units Inject through insulin pump 2/22/19  Yes Ashvin Joaquin APRN   Insulin Pen Needle (PEN NEEDLES 5/16\") 31G X 8 MM misc    Yes Provider, MD Krishna   medroxyPROGESTERone (PROVERA) 10 MG tablet Take 1 tablet by mouth Daily. 7/26/19  Yes Em Wong APRN        The patient has a family history of   Family History   Problem Relation Age of Onset   • Diabetes Mother    • Diabetes Maternal Aunt    • Hypertension Maternal Grandmother    • Diabetes Maternal Grandmother    • Osteoporosis Other         Past Medical History:   Diagnosis Date   • Female infertility    • Irregular periods    • Obesity    • Oligomenorrhea    • Polycystic ovaries    • Proteinuria    • Type 1 diabetes " "mellitus (CMS/[facility])    • Upper respiratory infection    • Visit for gynecologic examination 2015   • Vitamin D deficiency         OB History      Para Term  AB Living    4       4      SAB TAB Ectopic Molar Multiple Live Births    2   2                 Social History     Socioeconomic History   • Marital status:      Spouse name: Not on file   • Number of children: Not on file   • Years of education: Not on file   • Highest education level: Not on file   Tobacco Use   • Smoking status: Never Smoker   • Smokeless tobacco: Never Used   Substance and Sexual Activity   • Alcohol use: No   • Drug use: No   • Sexual activity: Yes     Partners: Male     Comment:  to Rajesh 8 years        Past Surgical History:   Procedure Laterality Date   • LAPAROSCOPY FOR ECTOPIC PREGNANCY          Patient Active Problem List   Diagnosis   • Diabetes mellitus type 1, uncontrolled, with complications (CMS/HCC)   • Vitamin D deficiency   • Obesity (BMI 30-39.9)        Documented Vitals    19 0946   BP: 123/76   Weight: 104 kg (229 lb)   Height: 162.6 cm (64\")   PainSc: 0-No pain        Body mass index is 39.31 kg/m².    Physical Exam   Constitutional: She is oriented to person, place, and time. She appears well-developed and well-nourished. No distress.   HENT:   Head: Normocephalic.   Neck: No thyromegaly present.   Cardiovascular: Normal rate, regular rhythm, normal heart sounds and intact distal pulses.   No murmur heard.  Pulmonary/Chest: Effort normal and breath sounds normal. No respiratory distress. She has no wheezes.   Abdominal: Soft. Bowel sounds are normal. She exhibits no distension and no mass. There is no tenderness. There is no rebound and no guarding.   Musculoskeletal: Normal range of motion. She exhibits no edema, tenderness or deformity.   Neurological: She is alert and oriented to person, place, and time.   Skin: Skin is warm and dry. No erythema.   Psychiatric: She has a normal " mood and affect. Her behavior is normal. Judgment and thought content normal.   Vitals reviewed.      Laboratory Data:   Lab Results - Last 18 Months   Lab Units 12/21/18  0123 12/20/18  2103 12/20/18  0032   GLUCOSE mg/dL 180* 172* 224*   BUN mg/dL 5* 6 7   CREATININE mg/dL 0.5 0.5 0.5   SODIUM mmol/L 138 137 132*   POTASSIUM mmol/L 3.4* 3.5 3.7   CHLORIDE mmol/L 104 101 98   TOTAL CO2 mmol/L 19* 16* 19*   CALCIUM mg/dL 8.5* 8.5* 9.3   TOTAL PROTEIN g/dL 6.7  --  8.3   ALBUMIN g/dL 2.6*  --  3.6   ALT (SGPT) U/L 21  --  33   AST (SGOT) U/L 19  --  27   ALK PHOS U/L 123*  --  148*   BILIRUBIN mg/dL 0.4  --  0.4   EGFR IF NONAFRICN AM  >60 >60 >60   ANION GAP mmol/L 15 20* 15     Lab Results - Last 18 Months   Lab Units 12/25/18  0415 12/24/18  0138 12/22/18  0223 12/20/18  0032   WBC K/uL 12.0* 7.2 13.7* 19.1*   RBC M/uL 3.87* 3.80* 3.80* 4.97   HEMOGLOBIN g/dL 10.3* 10.2* 10.3* 13.2   HEMATOCRIT % 32.0* 31.7* 32.2* 41.1   MCV fL 82.7 83.4 84.7 82.7   MCH pg 26.6* 26.8* 27.1 26.6*   MCHC g/dL 32.2* 32.2* 32.0* 32.1*   RDW % 15.3* 15.5* 15.9* 15.2*   MPV fL 9.1* 9.4 9.5 9.6   PLATELETS K/uL 509* 401* 359 371     Lab Results - Last 18 Months   Lab Units 12/20/18  0032   HCG QUALITATIVE  Negative       Assessment   Patient with oligomenorrhea secondary to polycystic ovarian syndrome with likely hyperestrogenic state.  Patient also with type 1 diabetes.  Plan to have patient return in 2 to 3 weeks for endometrial biopsy and to discuss different medical management options for her oligomenorrhea and to prevent hyperplasia from occurring.  Discussed with patient that I did not feel hysterectomy was needed at this time however may be needed in the future patient felt comfortable with this plan.  Patient to return sooner as needed.     Diagnosis Plan   1. Diabetes mellitus type 1, uncontrolled, with complications (CMS/HCC)     2. Obesity (BMI 30-39.9)     3. Secondary oligomenorrhea     4. PCOS (polycystic ovarian syndrome)              This document has been electronically signed by Shaq Cortez DO on September 18, 2019 5:56 AM

## 2019-09-19 DIAGNOSIS — Z87.42 HISTORY OF PCOS: ICD-10-CM

## 2019-09-19 DIAGNOSIS — N91.2 AMENORRHEA: ICD-10-CM

## 2019-09-26 ENCOUNTER — TELEPHONE (OUTPATIENT)
Dept: OBSTETRICS AND GYNECOLOGY | Facility: CLINIC | Age: 34
End: 2019-09-26

## 2019-09-26 NOTE — TELEPHONE ENCOUNTER
PATIENT LEFT A  TO GET A CALLBACK ABOUT US RESULTS  SHE SAYS IF YOU CANT  REACH HER BECAUSE SHE IS AT WORK AND WONT BE OFF UNTIL THIS AFTERNOON. YOU CAN LEAVE A MSG.

## 2019-11-07 ENCOUNTER — TELEPHONE (OUTPATIENT)
Dept: ENDOCRINOLOGY | Facility: CLINIC | Age: 34
End: 2019-11-07

## 2019-11-07 NOTE — TELEPHONE ENCOUNTER
Would like lab orders faxed to [facility]. Phone number is [phone] she does not know the fax number

## 2019-11-15 ENCOUNTER — OFFICE VISIT (OUTPATIENT)
Dept: ENDOCRINOLOGY | Facility: CLINIC | Age: 34
End: 2019-11-15

## 2019-11-15 ENCOUNTER — PROCEDURE VISIT (OUTPATIENT)
Dept: OBSTETRICS AND GYNECOLOGY | Facility: CLINIC | Age: 34
End: 2019-11-15

## 2019-11-15 VITALS
SYSTOLIC BLOOD PRESSURE: 142 MMHG | WEIGHT: 237 LBS | HEIGHT: 64 IN | BODY MASS INDEX: 40.46 KG/M2 | DIASTOLIC BLOOD PRESSURE: 94 MMHG

## 2019-11-15 VITALS
SYSTOLIC BLOOD PRESSURE: 126 MMHG | HEIGHT: 64 IN | BODY MASS INDEX: 40.94 KG/M2 | HEART RATE: 106 BPM | DIASTOLIC BLOOD PRESSURE: 78 MMHG | OXYGEN SATURATION: 98 % | WEIGHT: 239.8 LBS

## 2019-11-15 DIAGNOSIS — R10.2 PELVIC PAIN: ICD-10-CM

## 2019-11-15 DIAGNOSIS — N91.2 AMENORRHEA: Primary | ICD-10-CM

## 2019-11-15 DIAGNOSIS — E55.9 VITAMIN D DEFICIENCY: ICD-10-CM

## 2019-11-15 DIAGNOSIS — N94.10 FEMALE DYSPAREUNIA: ICD-10-CM

## 2019-11-15 DIAGNOSIS — E66.9 OBESITY (BMI 30-39.9): ICD-10-CM

## 2019-11-15 DIAGNOSIS — IMO0002 DIABETES MELLITUS TYPE 1, UNCONTROLLED, WITH COMPLICATIONS: Primary | ICD-10-CM

## 2019-11-15 DIAGNOSIS — R74.8 ELEVATED LIVER ENZYMES: ICD-10-CM

## 2019-11-15 LAB — HBA1C MFR BLD: 8.3 %

## 2019-11-15 PROCEDURE — 83036 HEMOGLOBIN GLYCOSYLATED A1C: CPT | Performed by: NURSE PRACTITIONER

## 2019-11-15 PROCEDURE — 88305 TISSUE EXAM BY PATHOLOGIST: CPT | Performed by: PATHOLOGY

## 2019-11-15 PROCEDURE — 58100 BIOPSY OF UTERUS LINING: CPT | Performed by: OBSTETRICS & GYNECOLOGY

## 2019-11-15 PROCEDURE — 99213 OFFICE O/P EST LOW 20 MIN: CPT | Performed by: OBSTETRICS & GYNECOLOGY

## 2019-11-15 PROCEDURE — 99214 OFFICE O/P EST MOD 30 MIN: CPT | Performed by: NURSE PRACTITIONER

## 2019-11-15 PROCEDURE — 88305 TISSUE EXAM BY PATHOLOGIST: CPT | Performed by: OBSTETRICS & GYNECOLOGY

## 2019-11-15 NOTE — PROGRESS NOTES
Subjective    Carey Medina is a 34 y.o. female. she is here today for follow-up.    History of Present Illness     Reason -- diabetes       Duration/Timing:Diabetes mellitus type 1, Age at onset of diabetes : 21 years, Onset of symptoms gradual        Timing - constant        Quality - controlled since pump        Severity - moderate      alleviating factors - intelligence       Aggravating factors - none            Severity (Complications/Hospitalizations)  Secondary Macrovascular Complications:  No CAD, No CVA, No PAD  Secondary Microvascular Complications:  No Diabetic Nephropathy, No Diabetic Retinopathy, No Diabetic Neuropathy        Context  Diabetes Regimen:Insulin, Not on Oral Medications, Compliant with regimen, last hgbA1c 7.1 % Oct. 2016            Blood Glucose Readings    Lab Results   Component Value Date    HGBA1C 8.3 11/15/2019           Medtronic  insulin pump     Insulin pump 183    Postprandial hyperglycemia             Exercise:  Exercises        Associated Signs/Symptoms  Hyperglycemic Symptoms:  No polyuria, No polydipsia, No polyphagia  Hypoglycemic Episodes:No documented hypoglycemia                     The following portions of the patient's history were reviewed and updated as appropriate:   Past Medical History:   Diagnosis Date   • Female infertility    • Irregular periods    • Obesity    • Oligomenorrhea    • Polycystic ovaries    • Proteinuria    • Type 1 diabetes mellitus (CMS/HCC)    • Upper respiratory infection    • Visit for gynecologic examination 2015   • Vitamin D deficiency      Past Surgical History:   Procedure Laterality Date   • LAPAROSCOPY FOR ECTOPIC PREGNANCY       Family History   Problem Relation Age of Onset   • Diabetes Mother    • Diabetes Maternal Aunt    • Hypertension Maternal Grandmother    • Diabetes Maternal Grandmother    • Osteoporosis Other      OB History      Para Term  AB Living    4       4      SAB TAB Ectopic Molar Multiple  "Live Births    2   2              Current Outpatient Medications   Medication Sig Dispense Refill   • BD ULTRA-FINE LANCETS lancets 33 gauge. Use 4 times daily 120 each 11   • glucagon (GLUCAGON EMERGENCY) 1 MG injection Inject 1 mg into the shoulder, thigh, or buttocks 1 (One) Time As Needed for low blood sugar (use for low blood sugar) for up to 1 dose. 1 kit 11   • glucagon (GLUCAGON EMERGENCY) 1 MG injection Inject 1 mg under the skin into the appropriate area as directed 1 (One) Time As Needed for Low Blood Sugar for up to 1 dose. 1 kit 12   • glucose blood (CHICO CONTOUR NEXT TEST) test strip Use as instructed - test 5 times daily 450 each 11   • insulin aspart (novoLOG) 100 UNIT/ML injection 120 units Inject through insulin pump 5 each 11   • Insulin Pen Needle (PEN NEEDLES 5/16\") 31G X 8 MM misc      • Lancets 30G misc Testing 6 times daily  E11.9 540 each 3     No current facility-administered medications for this visit.      Allergies   Allergen Reactions   • Percocet [Oxycodone-Acetaminophen] Hives and Itching     Social History     Socioeconomic History   • Marital status:      Spouse name: Not on file   • Number of children: Not on file   • Years of education: Not on file   • Highest education level: Not on file   Tobacco Use   • Smoking status: Never Smoker   • Smokeless tobacco: Never Used   Substance and Sexual Activity   • Alcohol use: No   • Drug use: No   • Sexual activity: Yes     Partners: Male     Comment:  to Rajesh 8 years       Review of Systems  Review of Systems   Constitutional: Negative for activity change, appetite change, diaphoresis and fatigue.   HENT: Negative for facial swelling, sneezing, sore throat, tinnitus, trouble swallowing and voice change.    Eyes: Negative for photophobia, pain, discharge, redness, itching and visual disturbance.   Respiratory: Negative for apnea, cough, choking, chest tightness and shortness of breath.    Cardiovascular: Negative for chest " "pain, palpitations and leg swelling.   Gastrointestinal: Negative for abdominal distention, abdominal pain, constipation, diarrhea, nausea and vomiting.   Endocrine: Negative for cold intolerance, heat intolerance, polydipsia, polyphagia and polyuria.   Genitourinary: Negative for difficulty urinating, dysuria, frequency, hematuria and urgency.   Musculoskeletal: Negative for arthralgias, back pain, gait problem, joint swelling, myalgias, neck pain and neck stiffness.   Skin: Negative for color change, pallor, rash and wound.   Neurological: Negative for dizziness, tremors, weakness, light-headedness, numbness and headaches.   Hematological: Negative for adenopathy. Does not bruise/bleed easily.   Psychiatric/Behavioral: Negative for behavioral problems, confusion and sleep disturbance.        Objective    /78   Pulse 106   Ht 162.6 cm (64\")   Wt 109 kg (239 lb 12.8 oz)   SpO2 98%   BMI 41.16 kg/m²   Physical Exam   Constitutional: She is oriented to person, place, and time. She appears well-developed and well-nourished. No distress.   HENT:   Head: Normocephalic and atraumatic.   Right Ear: External ear normal.   Left Ear: External ear normal.   Nose: Nose normal.   Eyes: Conjunctivae and EOM are normal. Pupils are equal, round, and reactive to light.   Neck: Normal range of motion. Neck supple. No tracheal deviation present. No thyromegaly present.   Cardiovascular: Normal rate, regular rhythm and normal heart sounds.   No murmur heard.  Pulmonary/Chest: Effort normal and breath sounds normal. No respiratory distress. She has no wheezes.   Abdominal: Soft. Bowel sounds are normal. There is no tenderness. There is no rebound and no guarding.   Musculoskeletal: Normal range of motion. She exhibits no edema, tenderness or deformity.   Neurological: She is alert and oriented to person, place, and time. No cranial nerve deficit.   Skin: Skin is warm and dry. No rash noted.   Psychiatric: She has a normal " "mood and affect. Her behavior is normal. Judgment and thought content normal.       Lab Review  Glucose (mg/dL)   Date Value   12/21/2018 180 (H)   12/20/2018 172 (H)   12/20/2018 224 (H)     Sodium (mmol/L)   Date Value   12/21/2018 138   12/20/2018 137   12/20/2018 132 (L)     Potassium (mmol/L)   Date Value   12/21/2018 3.4 (L)   12/20/2018 3.5   12/20/2018 3.7     Chloride (mmol/L)   Date Value   12/21/2018 104   12/20/2018 101   12/20/2018 98     CO2 (mmol/L)   Date Value   12/21/2018 19 (L)   12/20/2018 16 (L)   12/20/2018 19 (L)     BUN (mg/dL)   Date Value   12/21/2018 5 (L)   12/20/2018 6   12/20/2018 7     Creatinine (mg/dL)   Date Value   12/21/2018 0.5   12/20/2018 0.5   12/20/2018 0.5     Hemoglobin A1C   Date Value   11/15/2019 8.3 %   12/20/2018 7.9 % (H)   04/16/2015 7.3 %TotHgb (H)   11/04/2014 6.2 %TotHgb (H)     Triglycerides (mg/dl)   Date Value   11/04/2014 112     LDL Cholesterol  (mg/dl)   Date Value   11/04/2014 110       Assessment/Plan      1. Diabetes mellitus type 1, uncontrolled, with complications (CMS/HCC)    2. Vitamin D deficiency    3. Elevated liver enzymes    .    Medications prescribed:  Outpatient Encounter Medications as of 11/15/2019   Medication Sig Dispense Refill   • BD ULTRA-FINE LANCETS lancets 33 gauge. Use 4 times daily 120 each 11   • glucagon (GLUCAGON EMERGENCY) 1 MG injection Inject 1 mg into the shoulder, thigh, or buttocks 1 (One) Time As Needed for low blood sugar (use for low blood sugar) for up to 1 dose. 1 kit 11   • glucagon (GLUCAGON EMERGENCY) 1 MG injection Inject 1 mg under the skin into the appropriate area as directed 1 (One) Time As Needed for Low Blood Sugar for up to 1 dose. 1 kit 12   • glucose blood (CHICO CONTOUR NEXT TEST) test strip Use as instructed - test 5 times daily 450 each 11   • insulin aspart (novoLOG) 100 UNIT/ML injection 120 units Inject through insulin pump 5 each 11   • Insulin Pen Needle (PEN NEEDLES 5/16\") 31G X 8 MM misc      • " [DISCONTINUED] glucose blood (CHICO CONTOUR NEXT TEST) test strip Use as instructed - test 5 times daily 450 each 11   • [DISCONTINUED] insulin aspart (novoLOG) 100 UNIT/ML injection 120 units Inject through insulin pump 5 each 11   • Lancets 30G misc Testing 6 times daily  E11.9 540 each 3   • [DISCONTINUED] medroxyPROGESTERone (PROVERA) 10 MG tablet Take 1 tablet by mouth Daily. 10 tablet 0     No facility-administered encounter medications on file as of 11/15/2019.        Orders placed during this encounter include:  Orders Placed This Encounter   Procedures   • Comprehensive Metabolic Panel     Standing Status:   Future     Standing Expiration Date:   11/15/2020   • Hepatitis Panel, Acute     Standing Status:   Future     Standing Expiration Date:   11/15/2020   • POC Glycosylated Hemoglobin (Hb A1C)     Glycemic Management         Lab Results   Component Value Date    HGBA1C 8.3 11/15/2019                 medtronic insulin pump        BASAL  -        [place] - 1.85      Carb ratio      8.2 --- change to 7       Sensitivity      25               Lipid Management       Not on statin       Nov. 2014    LDL - 110         Blood Pressure Management: not on ace inhibitor        Not on lisinopril due to trying to get pregnant       Rechecked bp in office was 132/86       Will follow up with primary if remains elevated       Microvascular Complication Monitoring       Last eye exam 2016      no neuropathy      Immunizations: last influenza immunization 11-19 , last pneumococcal immunization 11-13  States will not take the flu shot again   Weight Related:Obesity        Following with Dr. Reddy          She will be having  a hysterectomy in June 2020     Bone Health        March 2016       Vit d - 14       States cannot take vitamin makes sick      try a multivitamin with vitamin d - takes daily      Other Diabetes Related Aspects      Nov. 2019      TSH - nl     Elevated liver enzymes           ALT- 95    AST - 58           4. Follow-up: Return in about 6 months (around 5/15/2020) for Recheck.

## 2019-11-18 LAB
LAB AP CASE REPORT: NORMAL
LAB AP CLINICAL INFORMATION: NORMAL
PATH REPORT.FINAL DX SPEC: NORMAL
PATH REPORT.GROSS SPEC: NORMAL

## 2019-12-02 ENCOUNTER — TELEPHONE (OUTPATIENT)
Dept: OBSTETRICS AND GYNECOLOGY | Facility: CLINIC | Age: 34
End: 2019-12-02

## 2019-12-03 ENCOUNTER — TELEPHONE (OUTPATIENT)
Dept: OBSTETRICS AND GYNECOLOGY | Facility: CLINIC | Age: 34
End: 2019-12-03

## 2020-04-06 ENCOUNTER — TELEPHONE (OUTPATIENT)
Dept: OBSTETRICS AND GYNECOLOGY | Facility: CLINIC | Age: 35
End: 2020-04-06

## 2020-04-06 NOTE — TELEPHONE ENCOUNTER
PT CALLED AND WANTED TO LET YOU KNOW THAT SHE HAS BEEN BLEEDING FOR 20 DAYS STRAIGHT.  PLEASE RETURN HER CALL.

## 2020-04-07 RX ORDER — MEDROXYPROGESTERONE ACETATE 10 MG/1
5 TABLET ORAL DAILY
Qty: 30 TABLET | Refills: 6 | Status: SHIPPED | OUTPATIENT
Start: 2020-04-07 | End: 2020-04-29 | Stop reason: DRUGHIGH

## 2020-04-29 RX ORDER — MEDROXYPROGESTERONE ACETATE 5 MG/1
TABLET ORAL
Qty: 60 TABLET | Refills: 3 | Status: SHIPPED | OUTPATIENT
Start: 2020-04-29 | End: 2020-04-30 | Stop reason: SDUPTHER

## 2020-04-30 RX ORDER — MEDROXYPROGESTERONE ACETATE 5 MG/1
TABLET ORAL
Qty: 60 TABLET | Refills: 3 | Status: SHIPPED | OUTPATIENT
Start: 2020-04-30 | End: 2020-06-11

## 2020-05-06 ENCOUNTER — OFFICE VISIT (OUTPATIENT)
Dept: OBSTETRICS AND GYNECOLOGY | Facility: CLINIC | Age: 35
End: 2020-05-06

## 2020-05-06 VITALS
HEIGHT: 64 IN | WEIGHT: 246.6 LBS | BODY MASS INDEX: 42.1 KG/M2 | DIASTOLIC BLOOD PRESSURE: 128 MMHG | SYSTOLIC BLOOD PRESSURE: 160 MMHG

## 2020-05-06 DIAGNOSIS — N93.9 ABNORMAL UTERINE BLEEDING (AUB): ICD-10-CM

## 2020-05-06 DIAGNOSIS — E28.2 PCOS (POLYCYSTIC OVARIAN SYNDROME): ICD-10-CM

## 2020-05-06 DIAGNOSIS — IMO0002 DIABETES MELLITUS TYPE 1, UNCONTROLLED, WITH COMPLICATIONS: ICD-10-CM

## 2020-05-06 DIAGNOSIS — N94.10 FEMALE DYSPAREUNIA: ICD-10-CM

## 2020-05-06 DIAGNOSIS — R10.2 PELVIC PAIN: Primary | ICD-10-CM

## 2020-05-06 PROCEDURE — 99213 OFFICE O/P EST LOW 20 MIN: CPT | Performed by: OBSTETRICS & GYNECOLOGY

## 2020-05-06 NOTE — PROGRESS NOTES
Carey Medina is a 34 y.o. y/o female.     Chief Complaint: Abnormal uterine bleeding and pelvic pain    HPI:   34 y.o. .  No LMP recorded. (Menstrual status: Other)..  Patient presents for follow-up on pelvic pain and abnormal uterine bleeding.  Patient has been on Provera for treatment initially of amenorrhea to prevent endometrial hyperplasia however since being on the medication now she is having continuous bleeding.  She is taking 2 tablets a day 1 in the morning 1 in the evening and bleeding has slowed but is still having daily bleeding.  Patient also states that pelvic pain has not improved with medication and she is having trouble completing normal daily activities due to the bleeding and the pain.  Patient would like to undergo hysterectomy.  I explained to her that given the COVID concerns right now we are not doing inpatient procedures but as soon as surgery can be done we will get her scheduled.  I reviewed her endometrial biopsy which was overall reassuring with no evidence of hyperplasia or endometrial cancer.     Review of Systems   Constitutional: Negative for chills, fatigue and fever.   HENT: Negative for sore throat.    Eyes: Negative for visual disturbance.   Respiratory: Negative for cough, shortness of breath and wheezing.    Cardiovascular: Negative for chest pain, palpitations and leg swelling.   Gastrointestinal: Negative for abdominal pain, diarrhea, nausea and vomiting.   Genitourinary: Positive for menstrual problem, pelvic pain and vaginal bleeding. Negative for dysuria, flank pain, frequency, vaginal discharge and vaginal pain.   Neurological: Negative for syncope, light-headedness and headaches.   Psychiatric/Behavioral: Negative for dysphoric mood and suicidal ideas. The patient is not nervous/anxious.         The following portions of the patient's history were reviewed and updated as appropriate: allergies, current medications, past family history, past medical history,  "past social history, past surgical history and problem list.    Allergies   Allergen Reactions   • Percocet [Oxycodone-Acetaminophen] Hives and Itching        Prior to Admission medications    Medication Sig Start Date End Date Taking? Authorizing Provider   BD ULTRA-FINE LANCETS lancets 33 gauge. Use 4 times daily 2/22/19  Yes Ashvin Joaquin APRN   glucagon (GLUCAGON EMERGENCY) 1 MG injection Inject 1 mg into the shoulder, thigh, or buttocks 1 (One) Time As Needed for low blood sugar (use for low blood sugar) for up to 1 dose. 11/14/16  Yes Ashvin Joaquin APRN   glucagon (GLUCAGON EMERGENCY) 1 MG injection Inject 1 mg under the skin into the appropriate area as directed 1 (One) Time As Needed for Low Blood Sugar for up to 1 dose. 2/22/19  Yes Ashvin Joaquin APRN   glucose blood (CHICO CONTOUR NEXT TEST) test strip Use as instructed - test 5 times daily 11/15/19  Yes Ashvin Joaquin APRN   insulin aspart (novoLOG) 100 UNIT/ML injection 120 units Inject through insulin pump 11/15/19  Yes Ashvin Joaquin APRN   Insulin Pen Needle (PEN NEEDLES 5/16\") 31G X 8 MM misc    Yes Provider, MD Krishna   Lancets 30G misc Testing 6 times daily  E11.9 11/15/19  Yes Ashvin Joaquin APRN   medroxyPROGESTERone (Provera) 5 MG tablet Take 1 tablet in the morning and another tablet in the evening 4/30/20  Yes Shaq Cortez, DO        The patient has a family history of   Family History   Problem Relation Age of Onset   • Diabetes Mother    • Diabetes Maternal Aunt    • Hypertension Maternal Grandmother    • Diabetes Maternal Grandmother    • Osteoporosis Other         Past Medical History:   Diagnosis Date   • Female infertility    • Irregular periods    • Obesity    • Oligomenorrhea    • Polycystic ovaries    • Proteinuria    • Type 1 diabetes mellitus (CMS/[facility])    • Upper respiratory infection    • Visit for gynecologic examination 01/30/2015   • Vitamin D deficiency     " "    OB History        4    Para        Term                AB   4    Living           SAB   2    TAB        Ectopic   2    Molar        Multiple        Live Births                     Social History     Socioeconomic History   • Marital status:      Spouse name: Not on file   • Number of children: Not on file   • Years of education: Not on file   • Highest education level: Not on file   Tobacco Use   • Smoking status: Never Smoker   • Smokeless tobacco: Never Used   Substance and Sexual Activity   • Alcohol use: No   • Drug use: No   • Sexual activity: Yes     Partners: Male     Comment:  to Rajesh 8 years        Past Surgical History:   Procedure Laterality Date   • LAPAROSCOPY FOR ECTOPIC PREGNANCY          Patient Active Problem List   Diagnosis   • Diabetes mellitus type 1, uncontrolled, with complications (CMS/HCC)   • Vitamin D deficiency   • Obesity (BMI 30-39.9)   • Elevated liver enzymes        Documented Vitals    20 0931   BP: (!) 160/128   Weight: 112 kg (246 lb 9.6 oz)   Height: 162.6 cm (64\")        Body mass index is 42.33 kg/m².    Physical Exam   Constitutional: She is oriented to person, place, and time. She appears well-developed and well-nourished. No distress.   HENT:   Head: Normocephalic.   Musculoskeletal: Normal range of motion.   Neurological: She is alert and oriented to person, place, and time.   Skin: Skin is warm and dry. She is not diaphoretic.   Psychiatric: She has a normal mood and affect. Her behavior is normal. Judgment and thought content normal.       Laboratory Data:   Lab Results - Last 18 Months   Lab Units 18  0415 18  0138 18  1259 18  0339 18  0609 18  0123   GLUCOSE mg/dL 141* 111*  --  56* 81 180*   BUN mg/dL 7 4*  --  5* 6 5*   CREATININE mg/dL <0.5 <0.5  --  <0.5 <0.5 0.5   SODIUM mmol/L 139 141  --  137 139 138   POTASSIUM mmol/L 3.8 3.2* 3.9 2.9* 3.4* 3.4*   CHLORIDE mmol/L 105 104  --  102 104 104 "   TOTAL CO2 mmol/L 24 26  --  25 19* 19*   CALCIUM mg/dL 8.9 8.5*  --  8.2* 8.3* 8.5*   TOTAL PROTEIN g/dL 6.5* 6.4*  --  6.4* 6.5* 6.7   ALBUMIN g/dL 2.8* 2.7*  --  2.6* 2.4* 2.6*   ALT (SGPT) U/L 20 19  --  18 18 21   AST (SGOT) U/L 15 18  --  19 18 19   ALK PHOS U/L 91 91  --  92 110* 123*   BILIRUBIN mg/dL <0.2 0.3  --  0.3 <0.2 0.4   EGFR IF NONAFRICN AM  >60 >60  --  >60 >60 >60   ANION GAP mmol/L 10 11  --  10 16 15     Lab Results - Last 18 Months   Lab Units 12/25/18  0415 12/24/18  0138 12/23/18  0339 12/22/18  0223 12/21/18  0123   WBC K/uL 12.0* 7.2 9.7 13.7* 16.7*   RBC M/uL 3.87* 3.80* 3.64* 3.80* 4.27   HEMOGLOBIN g/dL 10.3* 10.2* 9.7* 10.3* 11.3*   HEMATOCRIT % 32.0* 31.7* 30.5* 32.2* 36.2*   MCV fL 82.7 83.4 83.8 84.7 84.8   MCH pg 26.6* 26.8* 26.6* 27.1 26.5*   MCHC g/dL 32.2* 32.2* 31.8* 32.0* 31.2*   RDW % 15.3* 15.5* 15.7* 15.9* 15.7*   MPV fL 9.1* 9.4 9.1* 9.5 9.7   PLATELETS K/uL 509* 401* 353 359 349     Lab Results - Last 18 Months   Lab Units 12/20/18  0132   HCG QUALITATIVE  Negative       Assessment   Patient with continued pelvic pain despite medical management.  And now with worsening abnormal uterine bleeding because of medical management.  Patient now desiring definitive management of her pain and bleeding via hysterectomy.  Will call patient once able to do inpatient surgeries and will plan to move forward with hysterectomy at that time.     Diagnosis Plan   1. Pelvic pain     2. Female dyspareunia     3. PCOS (polycystic ovarian syndrome)     4. Diabetes mellitus type 1, uncontrolled, with complications (CMS/[facility])     5. Abnormal uterine bleeding (AUB)               This document has been electronically signed by Shaq Cortez DO on May 6, 2020 13:11

## 2020-05-08 ENCOUNTER — TELEMEDICINE (OUTPATIENT)
Dept: ENDOCRINOLOGY | Facility: CLINIC | Age: 35
End: 2020-05-08

## 2020-05-08 DIAGNOSIS — E55.9 VITAMIN D DEFICIENCY: Primary | ICD-10-CM

## 2020-05-08 DIAGNOSIS — IMO0002 DIABETES MELLITUS TYPE 1, UNCONTROLLED, WITH COMPLICATIONS: ICD-10-CM

## 2020-05-08 PROCEDURE — 99214 OFFICE O/P EST MOD 30 MIN: CPT | Performed by: NURSE PRACTITIONER

## 2020-05-08 NOTE — PROGRESS NOTES
Subjective    Carey Medina is a 34 y.o. female. she is here today for follow-up.    History of Present Illness     You have chosen to receive care through a telehealth visit.  Do you consent to use a video/audio connection for your medical care today? Yes          TELEHEALTH VIDEO VISIT     This a video visit due to [facility] current guidelines for social distancing due to the COVID 19 pandemic          Reason -- diabetes         Duration/Timing:Diabetes mellitus type 1, Age at onset of diabetes : 21 years, Onset of symptoms gradual        Timing - constant        Quality - controlled since pump        Severity - moderate      alleviating factors - intelligence       Aggravating factors - none     Now on provera -- needing hysterectomy            Severity (Complications/Hospitalizations)  Secondary Macrovascular Complications:  No CAD, No CVA, No PAD  Secondary Microvascular Complications:  No Diabetic Nephropathy, No Diabetic Retinopathy, No Diabetic Neuropathy        Context  Diabetes Regimen:Insulin, Not on Oral Medications, Compliant with regimen, last hgbA1c 7.1 % Oct. 2016            Blood Glucose Readings           Lab Results   Component Value Date     HGBA1C 8.3 11/15/2019             Medtronic  insulin pump    States having hyperglycemia after meals              Exercise:  Exercises        Associated Signs/Symptoms  Hyperglycemic Symptoms:  No polyuria, No polydipsia, No polyphagia  Hypoglycemic Episodes:No documented hypoglycemia                     The following portions of the patient's history were reviewed and updated as appropriate:   Past Medical History:   Diagnosis Date   • Female infertility    • Irregular periods    • Obesity    • Oligomenorrhea    • Polycystic ovaries    • Proteinuria    • Type 1 diabetes mellitus (CMS/HCC)    • Upper respiratory infection    • Visit for gynecologic examination 01/30/2015   • Vitamin D deficiency      Past Surgical History:   Procedure Laterality Date   •  "LAPAROSCOPY FOR ECTOPIC PREGNANCY       Family History   Problem Relation Age of Onset   • Diabetes Mother    • Diabetes Maternal Aunt    • Hypertension Maternal Grandmother    • Diabetes Maternal Grandmother    • Osteoporosis Other      OB History        4    Para        Term                AB   4    Living           SAB   2    TAB        Ectopic   2    Molar        Multiple        Live Births                  Current Outpatient Medications   Medication Sig Dispense Refill   • BD ULTRA-FINE LANCETS lancets 33 gauge. Use 4 times daily 120 each 11   • glucagon (GLUCAGON EMERGENCY) 1 MG injection Inject 1 mg into the shoulder, thigh, or buttocks 1 (One) Time As Needed for low blood sugar (use for low blood sugar) for up to 1 dose. 1 kit 11   • glucagon (GLUCAGON EMERGENCY) 1 MG injection Inject 1 mg under the skin into the appropriate area as directed 1 (One) Time As Needed for Low Blood Sugar for up to 1 dose. 1 kit 12   • glucose blood (CHICO CONTOUR NEXT TEST) test strip Use as instructed - test 5 times daily 450 each 11   • insulin aspart (novoLOG) 100 UNIT/ML injection 120 units Inject through insulin pump 15 each 3   • Insulin Pen Needle (PEN NEEDLES 5/16\") 31G X 8 MM misc      • Lancets 30G misc Testing 6 times daily  E11.9 540 each 3   • medroxyPROGESTERone (Provera) 5 MG tablet Take 1 tablet in the morning and another tablet in the evening 60 tablet 3     No current facility-administered medications for this visit.      Allergies   Allergen Reactions   • Percocet [Oxycodone-Acetaminophen] Hives and Itching     Social History     Socioeconomic History   • Marital status:      Spouse name: Not on file   • Number of children: Not on file   • Years of education: Not on file   • Highest education level: Not on file   Tobacco Use   • Smoking status: Never Smoker   • Smokeless tobacco: Never Used   Substance and Sexual Activity   • Alcohol use: No   • Drug use: No   • Sexual activity: Yes     " Partners: Male     Comment:  to Rajesh 8 years       Review of Systems  Review of Systems   Constitutional: Negative for activity change, appetite change, diaphoresis and fatigue.   HENT: Negative for facial swelling, sneezing, sore throat, tinnitus, trouble swallowing and voice change.    Eyes: Negative for photophobia, pain, discharge, redness, itching and visual disturbance.   Respiratory: Negative for apnea, cough, choking, chest tightness and shortness of breath.    Cardiovascular: Negative for chest pain, palpitations and leg swelling.   Gastrointestinal: Negative for abdominal distention, abdominal pain, constipation, diarrhea, nausea and vomiting.   Endocrine: Negative for cold intolerance, heat intolerance, polydipsia, polyphagia and polyuria.   Genitourinary: Negative for difficulty urinating, dysuria, frequency, hematuria and urgency.   Musculoskeletal: Negative for arthralgias, back pain, gait problem, joint swelling, myalgias, neck pain and neck stiffness.   Skin: Negative for color change, pallor, rash and wound.   Neurological: Negative for dizziness, tremors, weakness, light-headedness, numbness and headaches.   Hematological: Negative for adenopathy. Does not bruise/bleed easily.   Psychiatric/Behavioral: Negative for behavioral problems, confusion and sleep disturbance.        Objective    There were no vitals taken for this visit.  Physical Exam   Constitutional: She is oriented to person, place, and time. She appears well-developed and well-nourished. No distress.   HENT:   Head: Normocephalic and atraumatic.   Right Ear: External ear normal.   Left Ear: External ear normal.   Nose: Nose normal.   Mouth/Throat: Oropharynx is clear and moist.   Eyes: Pupils are equal, round, and reactive to light. Conjunctivae and EOM are normal.   Neck: Normal range of motion.   Pulmonary/Chest: Effort normal. No respiratory distress.   Musculoskeletal: Normal range of motion.   Neurological: She is alert  and oriented to person, place, and time.   Skin: No pallor.   Psychiatric: She has a normal mood and affect. Her behavior is normal. Judgment and thought content normal.       Lab Review  Glucose (mg/dL)   Date Value   12/25/2018 141 (H)   12/24/2018 111 (H)   12/23/2018 56 (L)     Sodium (mmol/L)   Date Value   12/25/2018 139   12/24/2018 141   12/23/2018 137     Potassium (mmol/L)   Date Value   12/25/2018 3.8   12/24/2018 3.2 (L)   12/23/2018 3.9     Chloride (mmol/L)   Date Value   12/25/2018 105   12/24/2018 104   12/23/2018 102     CO2 (mmol/L)   Date Value   12/25/2018 24   12/24/2018 26   12/23/2018 25     BUN (mg/dL)   Date Value   12/25/2018 7   12/24/2018 4 (L)   12/23/2018 5 (L)     Creatinine (mg/dL)   Date Value   12/25/2018 <0.5   12/24/2018 <0.5   12/23/2018 <0.5     Hemoglobin A1C   Date Value   11/15/2019 8.3 %   12/20/2018 7.9 % (H)   04/16/2015 7.3 %TotHgb (H)   11/04/2014 6.2 %TotHgb (H)     Triglycerides (mg/dl)   Date Value   11/04/2014 112     LDL Cholesterol  (mg/dl)   Date Value   11/04/2014 110       Assessment/Plan      1. Vitamin D deficiency    2. Diabetes mellitus type 1, uncontrolled, with complications (CMS/[facility])    .    Medications prescribed:  Outpatient Encounter Medications as of 5/8/2020   Medication Sig Dispense Refill   • BD ULTRA-FINE LANCETS lancets 33 gauge. Use 4 times daily 120 each 11   • glucagon (GLUCAGON EMERGENCY) 1 MG injection Inject 1 mg into the shoulder, thigh, or buttocks 1 (One) Time As Needed for low blood sugar (use for low blood sugar) for up to 1 dose. 1 kit 11   • glucagon (GLUCAGON EMERGENCY) 1 MG injection Inject 1 mg under the skin into the appropriate area as directed 1 (One) Time As Needed for Low Blood Sugar for up to 1 dose. 1 kit 12   • glucose blood (CHICO CONTOUR NEXT TEST) test strip Use as instructed - test 5 times daily 450 each 11   • insulin aspart (novoLOG) 100 UNIT/ML injection 120 units Inject through insulin pump 15 each 3   • Insulin  "Pen Needle (PEN NEEDLES 5/16\") 31G X 8 MM misc      • Lancets 30G misc Testing 6 times daily  E11.9 540 each 3   • medroxyPROGESTERone (Provera) 5 MG tablet Take 1 tablet in the morning and another tablet in the evening 60 tablet 3   • [DISCONTINUED] insulin aspart (novoLOG) 100 UNIT/ML injection 120 units Inject through insulin pump 5 each 11     No facility-administered encounter medications on file as of 5/8/2020.        Orders placed during this encounter include:  Orders Placed This Encounter   Procedures   • Comprehensive Metabolic Panel     Standing Status:   Future     Standing Expiration Date:   5/8/2021   • Hemoglobin A1c     Standing Status:   Future     Standing Expiration Date:   5/8/2021   • TSH     Standing Status:   Future     Standing Expiration Date:   5/8/2021   • Vitamin D 25 Hydroxy     Standing Status:   Future     Standing Expiration Date:   5/8/2021   • Protein / Creatinine Ratio, Urine - Urine, Clean Catch     Standing Status:   Future     Standing Expiration Date:   5/8/2021   • Microalbumin / Creatinine Urine Ratio - Urine, Clean Catch     Standing Status:   Future     Standing Expiration Date:   5/8/2021   • Vitamin B12     Standing Status:   Future     Standing Expiration Date:   5/8/2021   • Lipid Panel     Standing Status:   Future     Standing Expiration Date:   5/8/2021   • CBC & Differential     Standing Status:   Future     Standing Expiration Date:   5/8/2021     Order Specific Question:   Manual Differential     Answer:   No     Glycemic Management                 Lab Results   Component Value Date     HGBA1C 8.3 11/15/2019                   medtronic insulin pump        BASAL  -        [place] - 1.85      Carb ratio       7  ---- change to 6      Sensitivity      25               Lipid Management       Not on statin       Nov. 2014    LDL - 110         Blood Pressure Management:     Not on ACEi     Microvascular Complication Monitoring       Last eye exam overdue postponed due to " pandemic      no neuropathy      Immunizations: last influenza immunization 11-19 , last pneumococcal immunization 11-13  States will not take the flu shot again   Weight Related:Obesity        Following with Dr. Reddy            She will be having  a hysterectomy in June 2020     [facility]        March 2016       Vit d - 14       States cannot take vitamin makes sick      try a multivitamin with vitamin d - takes daily      Other Diabetes Related Aspects      Nov. 2019      TSH - nl      Elevated liver enzymes              ALT- 95     AST - 58              We spent 25 minutes including reviewing the patients electronic chart, reviewing medications, reviewing labs, active problems    I provided advice regarding diabetes management, dietary changes, adjustments of medication, self titration of insulin, refilled medications, ordering labs, future appointments    Patient was advised to contact the office if there are unanswered questions, trouble with blood glucose management, or any concerns     4. Follow-up: Return in about 3 months (around 8/8/2020).

## 2020-05-14 DIAGNOSIS — R10.2 PELVIC PAIN: Primary | ICD-10-CM

## 2020-05-14 DIAGNOSIS — R10.2 PELVIC PAIN: ICD-10-CM

## 2020-05-14 RX ORDER — HYDROCODONE BITARTRATE AND ACETAMINOPHEN 5; 325 MG/1; MG/1
1 TABLET ORAL EVERY 6 HOURS PRN
Qty: 10 TABLET | Refills: 0 | Status: SHIPPED | OUTPATIENT
Start: 2020-05-14 | End: 2020-06-16 | Stop reason: HOSPADM

## 2020-05-14 RX ORDER — HYDROCODONE BITARTRATE AND ACETAMINOPHEN 5; 325 MG/1; MG/1
1 TABLET ORAL EVERY 6 HOURS PRN
Qty: 10 TABLET | Refills: 0 | Status: SHIPPED | OUTPATIENT
Start: 2020-05-14 | End: 2020-05-14

## 2020-05-14 NOTE — PROGRESS NOTES
Patient called with increasing pain that is not relieved by Provera or ibuprofen.  Requesting something stronger for pain.  Will place prescription for Norco 10 tablets, patient is going to be scheduled for hysterectomy once COVID restrictions are lifted.

## 2020-06-08 ENCOUNTER — PREP FOR SURGERY (OUTPATIENT)
Dept: OTHER | Facility: HOSPITAL | Age: 35
End: 2020-06-08

## 2020-06-08 ENCOUNTER — TELEPHONE (OUTPATIENT)
Dept: OBSTETRICS AND GYNECOLOGY | Facility: CLINIC | Age: 35
End: 2020-06-08

## 2020-06-08 DIAGNOSIS — N94.10 FEMALE DYSPAREUNIA: ICD-10-CM

## 2020-06-08 DIAGNOSIS — R10.2 PELVIC PAIN: Primary | ICD-10-CM

## 2020-06-08 DIAGNOSIS — N93.9 ABNORMAL UTERINE BLEEDING (AUB): ICD-10-CM

## 2020-06-08 RX ORDER — SODIUM CHLORIDE 0.9 % (FLUSH) 0.9 %
3 SYRINGE (ML) INJECTION EVERY 12 HOURS SCHEDULED
Status: CANCELLED | OUTPATIENT
Start: 2020-06-15

## 2020-06-08 RX ORDER — SODIUM CHLORIDE, SODIUM LACTATE, POTASSIUM CHLORIDE, CALCIUM CHLORIDE 600; 310; 30; 20 MG/100ML; MG/100ML; MG/100ML; MG/100ML
125 INJECTION, SOLUTION INTRAVENOUS CONTINUOUS
Status: CANCELLED | OUTPATIENT
Start: 2020-06-15

## 2020-06-08 RX ORDER — SODIUM CHLORIDE 0.9 % (FLUSH) 0.9 %
10 SYRINGE (ML) INJECTION AS NEEDED
Status: CANCELLED | OUTPATIENT
Start: 2020-06-15

## 2020-06-08 RX ORDER — CEFAZOLIN SODIUM IN 0.9 % NACL 3 G/100 ML
3 INTRAVENOUS SOLUTION, PIGGYBACK (ML) INTRAVENOUS ONCE
Status: CANCELLED | OUTPATIENT
Start: 2020-06-15 | End: 2020-06-08

## 2020-06-08 NOTE — TELEPHONE ENCOUNTER
Malinda called and spoke with patient told her we were able to do surgery now patient would like to undergo surgery on 15 Cristina.  Patient to come see me later this week for preop.

## 2020-06-10 ENCOUNTER — OFFICE VISIT (OUTPATIENT)
Dept: OBSTETRICS AND GYNECOLOGY | Facility: CLINIC | Age: 35
End: 2020-06-10

## 2020-06-10 VITALS
DIASTOLIC BLOOD PRESSURE: 102 MMHG | SYSTOLIC BLOOD PRESSURE: 134 MMHG | HEIGHT: 64 IN | WEIGHT: 233.8 LBS | BODY MASS INDEX: 39.91 KG/M2

## 2020-06-10 DIAGNOSIS — R10.2 PELVIC PAIN: Primary | ICD-10-CM

## 2020-06-10 DIAGNOSIS — N94.10 FEMALE DYSPAREUNIA: ICD-10-CM

## 2020-06-10 DIAGNOSIS — E28.2 PCOS (POLYCYSTIC OVARIAN SYNDROME): ICD-10-CM

## 2020-06-10 DIAGNOSIS — N93.9 ABNORMAL UTERINE BLEEDING (AUB): ICD-10-CM

## 2020-06-10 DIAGNOSIS — E66.9 OBESITY (BMI 30-39.9): ICD-10-CM

## 2020-06-10 PROCEDURE — 99213 OFFICE O/P EST LOW 20 MIN: CPT | Performed by: OBSTETRICS & GYNECOLOGY

## 2020-06-10 RX ORDER — HYDROCHLOROTHIAZIDE 25 MG/1
25 TABLET ORAL DAILY
COMMUNITY
Start: 2020-05-20

## 2020-06-10 RX ORDER — LISINOPRIL 10 MG/1
10 TABLET ORAL DAILY
COMMUNITY
Start: 2020-05-27 | End: 2021-03-04 | Stop reason: SDUPTHER

## 2020-06-10 NOTE — H&P (VIEW-ONLY)
Carey Medina is a 34 y.o. y/o female.     Chief Complaint: Abnormal uterine bleeding and pelvic pain    HPI:   34 y.o. .  No LMP recorded. (Menstrual status: Other)..  Patient presents for preoperative evaluation prior to hysterectomy scheduled on 15 Cristina.  Plan to do total laparoscopic hysterectomy with right salpingo-oophorectomy and left salpingectomy.  Patient has tried conservative management with Provera which gave mild relief of bleeding but is still having pain and more bleeding than she can tolerate.  Patient is now desiring more definitive management of her symptoms via hysterectomy.    Patient requests laparoscopic hysterectomy with right salpingo-oophorectomy and left salpingectomy. She understands the risk up to and including death. She understands the risk of serious urologic morbidity such as vesico vaginal fistula, damage to the ureter and or bladder with possible need for additional surgery and low possibility of nephrectomy and/or extended morbidity. She understands the risk of damage to bowel possible colostomy. She understands the risk of damage to bowel undetected at time of procedure with sepsis and/or need returned OR.  I discussed the risk of bleeding with the patient and possible risk of blood transfusion.  Blood products carry risk of HIV, infection, hepatitis, and transfusion reaction. Patient is willing to accept blood products. She understands the risk of delayed hemorrhage with possible need to return to the OR. She understands the risk of hematoma formation. She understands the risk of infection in the abdominal wall, pelvis, abdomen and other parts of the body. She understands the alternatives of medical therapy and the risks and alternatives. Her questions are answered at length. She understands that there is a  possibility that we may need to convert this to a total abdominal hysterectomy and she accepts this. Patient expressed understanding and desires to proceed with  surgery.    Note from first visit: Patient presents for follow-up on pelvic pain and abnormal uterine bleeding.  Patient has been on Provera for treatment initially of amenorrhea to prevent endometrial hyperplasia however since being on the medication now she is having continuous bleeding.  She is taking 2 tablets a day 1 in the morning 1 in the evening and bleeding has slowed but is still having daily bleeding.  Patient also states that pelvic pain has not improved with medication and she is having trouble completing normal daily activities due to the bleeding and the pain.  Patient would like to undergo hysterectomy.  I explained to her that given the COVID concerns right now we are not doing inpatient procedures but as soon as surgery can be done we will get her scheduled.  I reviewed her endometrial biopsy which was overall reassuring with no evidence of hyperplasia or endometrial cancer.     Review of Systems   Constitutional: Negative for chills, fatigue and fever.   HENT: Negative for sore throat.    Eyes: Negative for visual disturbance.   Respiratory: Negative for cough, shortness of breath and wheezing.    Cardiovascular: Negative for chest pain, palpitations and leg swelling.   Gastrointestinal: Negative for abdominal pain, diarrhea, nausea and vomiting.   Genitourinary: Positive for menstrual problem, pelvic pain and vaginal bleeding. Negative for dysuria, flank pain, frequency, vaginal discharge and vaginal pain.   Neurological: Negative for syncope, light-headedness and headaches.   Psychiatric/Behavioral: Negative for dysphoric mood and suicidal ideas. The patient is not nervous/anxious.         The following portions of the patient's history were reviewed and updated as appropriate: allergies, current medications, past family history, past medical history, past social history, past surgical history and problem list.    Allergies   Allergen Reactions   • Percocet [Oxycodone-Acetaminophen] Hives and  "Itching        Prior to Admission medications    Medication Sig Start Date End Date Taking? Authorizing Provider   BD ULTRA-FINE LANCETS lancets 33 gauge. Use 4 times daily 2/22/19  Yes Ashvin Joaquin APRN   glucagon (GLUCAGON EMERGENCY) 1 MG injection Inject 1 mg into the shoulder, thigh, or buttocks 1 (One) Time As Needed for low blood sugar (use for low blood sugar) for up to 1 dose. 11/14/16  Yes Ashvin Joaquin APRN   glucagon (GLUCAGON EMERGENCY) 1 MG injection Inject 1 mg under the skin into the appropriate area as directed 1 (One) Time As Needed for Low Blood Sugar for up to 1 dose. 2/22/19  Yes Ashvin Joaquin APRN   glucose blood (CHICO CONTOUR NEXT TEST) test strip Use as instructed - test 5 times daily 11/15/19  Yes Ashvin Joaquin APRN   hydroCHLOROthiazide (HYDRODIURIL) 25 MG tablet  5/20/20  Yes Krishna Teague MD   HYDROcodone-acetaminophen (Norco) 5-325 MG per tablet Take 1 tablet by mouth Every 6 (Six) Hours As Needed for Severe Pain . 5/14/20  Yes Shaq Cortez,    insulin aspart (novoLOG) 100 UNIT/ML injection 120 units Inject through insulin pump 5/8/20  Yes Ashvin Joaquin APRN   Insulin Pen Needle (PEN NEEDLES 5/16\") 31G X 8 MM misc    Yes Krishna Teague MD   Lancets 30G misc Testing 6 times daily  E11.9 11/15/19  Yes Ashvin Joaquin APRN   lisinopril (PRINIVIL,ZESTRIL) 10 MG tablet  5/27/20  Yes Krishna Teague MD   medroxyPROGESTERone (Provera) 5 MG tablet Take 1 tablet in the morning and another tablet in the evening 4/30/20  Yes Shaq Cortez DO        The patient has a family history of   Family History   Problem Relation Age of Onset   • Diabetes Mother    • Diabetes Maternal Aunt    • Hypertension Maternal Grandmother    • Diabetes Maternal Grandmother    • Osteoporosis Other         Past Medical History:   Diagnosis Date   • Female infertility    • Irregular periods    • Obesity    • Oligomenorrhea " "   • Polycystic ovaries    • Proteinuria    • Type 1 diabetes mellitus (CMS/HCC)    • Upper respiratory infection    • Visit for gynecologic examination 2015   • Vitamin D deficiency         OB History        4    Para        Term                AB   4    Living           SAB   2    TAB        Ectopic   2    Molar        Multiple        Live Births                     Social History     Socioeconomic History   • Marital status:      Spouse name: Not on file   • Number of children: Not on file   • Years of education: Not on file   • Highest education level: Not on file   Tobacco Use   • Smoking status: Never Smoker   • Smokeless tobacco: Never Used   Substance and Sexual Activity   • Alcohol use: No   • Drug use: No   • Sexual activity: Yes     Partners: Male     Comment:  to Rajesh 8 years        Past Surgical History:   Procedure Laterality Date   • LAPAROSCOPY FOR ECTOPIC PREGNANCY          Patient Active Problem List   Diagnosis   • Diabetes mellitus type 1, uncontrolled, with complications (CMS/HCC)   • Vitamin D deficiency   • Obesity (BMI 30-39.9)   • Elevated liver enzymes   • Pelvic pain   • Abnormal uterine bleeding (AUB)   • Female dyspareunia        Documented Vitals    06/10/20 1256   BP: (!) 134/102   Weight: 106 kg (233 lb 12.8 oz)   Height: 162.6 cm (64\")        Body mass index is 40.13 kg/m².    Physical Exam   Constitutional: She is oriented to person, place, and time. She appears well-developed and well-nourished. No distress.   HENT:   Head: Normocephalic.   Neck: No thyromegaly present.   Cardiovascular: Normal rate, regular rhythm, normal heart sounds and intact distal pulses.   No murmur heard.  Pulmonary/Chest: Effort normal and breath sounds normal. No respiratory distress. She has no wheezes.   Abdominal: Soft. Bowel sounds are normal. She exhibits no distension and no mass. There is no tenderness. There is no rebound and no guarding.   Genitourinary: Vagina " normal and uterus normal. No vaginal discharge found.   Musculoskeletal: Normal range of motion. She exhibits no edema, tenderness or deformity.   Neurological: She is alert and oriented to person, place, and time.   Skin: Skin is warm and dry. No erythema.   Psychiatric: She has a normal mood and affect. Her behavior is normal. Judgment and thought content normal.   Vitals reviewed.      Laboratory Data:   Lab Results - Last 18 Months   Lab Units 12/25/18 0415 12/24/18  0138 12/23/18  1259 12/23/18  0339 12/22/18  0609 12/21/18  0123   GLUCOSE mg/dL 141* 111*  --  56* 81 180*   BUN mg/dL 7 4*  --  5* 6 5*   CREATININE mg/dL <0.5 <0.5  --  <0.5 <0.5 0.5   SODIUM mmol/L 139 141  --  137 139 138   POTASSIUM mmol/L 3.8 3.2* 3.9 2.9* 3.4* 3.4*   CHLORIDE mmol/L 105 104  --  102 104 104   TOTAL CO2 mmol/L 24 26  --  25 19* 19*   CALCIUM mg/dL 8.9 8.5*  --  8.2* 8.3* 8.5*   TOTAL PROTEIN g/dL 6.5* 6.4*  --  6.4* 6.5* 6.7   ALBUMIN g/dL 2.8* 2.7*  --  2.6* 2.4* 2.6*   ALT (SGPT) U/L 20 19  --  18 18 21   AST (SGOT) U/L 15 18  --  19 18 19   ALK PHOS U/L 91 91  --  92 110* 123*   BILIRUBIN mg/dL <0.2 0.3  --  0.3 <0.2 0.4   EGFR IF NONAFRICN AM  >60 >60  --  >60 >60 >60   ANION GAP mmol/L 10 11  --  10 16 15     Lab Results - Last 18 Months   Lab Units 12/25/18 0415 12/24/18 0138 12/23/18  0339 12/22/18  0223 12/21/18  0123   WBC K/uL 12.0* 7.2 9.7 13.7* 16.7*   RBC M/uL 3.87* 3.80* 3.64* 3.80* 4.27   HEMOGLOBIN g/dL 10.3* 10.2* 9.7* 10.3* 11.3*   HEMATOCRIT % 32.0* 31.7* 30.5* 32.2* 36.2*   MCV fL 82.7 83.4 83.8 84.7 84.8   MCH pg 26.6* 26.8* 26.6* 27.1 26.5*   MCHC g/dL 32.2* 32.2* 31.8* 32.0* 31.2*   RDW % 15.3* 15.5* 15.7* 15.9* 15.7*   MPV fL 9.1* 9.4 9.1* 9.5 9.7   PLATELETS K/uL 509* 401* 353 359 349     Lab Results - Last 18 Months   Lab Units 12/20/18  0132   HCG QUALITATIVE  Negative       Assessment   Patient with chronic pelvic pain and abnormal uterine bleeding along with dyspareunia.  Patient has tried  medical management without success.  Now desiring more definitive management of her symptoms.  Plan for total laparoscopic hysterectomy with right salpingo-oophorectomy as majority of pain is on the right side and left salpingectomy.  Plan for patient to see me approximately 1 week postop.  Return sooner as needed.     Diagnosis Plan   1. Pelvic pain     2. Abnormal uterine bleeding (AUB)     3. Female dyspareunia     4. Obesity (BMI 30-39.9)     5. PCOS (polycystic ovarian syndrome)             This document has been electronically signed by Shaq Cortez DO on Cristina 10, 2020 13:06

## 2020-06-11 RX ORDER — MEDROXYPROGESTERONE ACETATE 5 MG/1
5 TABLET ORAL 2 TIMES DAILY
COMMUNITY
End: 2021-03-04

## 2020-06-11 NOTE — DISCHARGE INSTRUCTIONS
[facility]  Pre-op Information and Guidelines    You will be called after 2 p.m. the day before your surgery (Friday for Monday surgery) and notified of your time for arrival and approximate surgery time.  If you have not received a call by 4P.M., please contact Same Day Surgery at [phone] of if outside [facility] call [phone].    Please Follow these Important Safety Guidelines:    • The morning of your procedure, take only the medications listed below with   A sip of water:_____________________________________________       ______________________________________________    • DO NOT eat or drink anything after 12:00 midnight the night before surgery  Specific instructions concerning drinking clear liquids will be discussed during  the pre-surgery instruction call the day before your surgery.    • If you take a blood thinner (ex. Plavix, Coumadin, aspirin), ask your doctor when to stop it before surgery  STOP DATE: _________________    • Only 2 visitors are allowed in patient rooms at a time  Your visitors will be asked to wait in the lobby until the admission process is complete with the exception of a parent with a child and patients in need of special assistance.    • YOU CANNOT DRIVE YOURSELF HOME  You must be accompanied by someone who will be responsible for driving you home after surgery and for your care at home.    • DO NOT chew gum, use breath mints, hard candy, or smoke the day of surgery  • DO NOT drink alcohol for at least 24 hours before your surgery  • DO NOT wear any jewelry and remove all body piercing before coming to the hospital  • DO NOT wear make-up to the hospital  • If you are having surgery on an extremity (arm/leg/foot) remove nail polish/artificial nails on the surgical side  • Clothing, glasses, contacts, dentures, and hairpieces must be removed before surgery  • Bathe the night before or the morning of your surgery and do not use powders/lotions on  skin.

## 2020-06-12 ENCOUNTER — TELEPHONE (OUTPATIENT)
Dept: ENDOCRINOLOGY | Facility: CLINIC | Age: 35
End: 2020-06-12

## 2020-06-12 ENCOUNTER — ANESTHESIA EVENT (OUTPATIENT)
Dept: PERIOP | Facility: HOSPITAL | Age: 35
End: 2020-06-12

## 2020-06-12 PROCEDURE — U0003 INFECTIOUS AGENT DETECTION BY NUCLEIC ACID (DNA OR RNA); SEVERE ACUTE RESPIRATORY SYNDROME CORONAVIRUS 2 (SARS-COV-2) (CORONAVIRUS DISEASE [COVID-19]), AMPLIFIED PROBE TECHNIQUE, MAKING USE OF HIGH THROUGHPUT TECHNOLOGIES AS DESCRIBED BY CMS-2020-01-R: HCPCS | Performed by: OBSTETRICS & GYNECOLOGY

## 2020-06-12 NOTE — TELEPHONE ENCOUNTER
Walked pt through how to set up and cancel temp basal. Instructed to run a temp basal of 70% the night before surgery until she starts eating again. Pt wrote it down and verbalized understanding.   ----- Message from HORACE Anglin sent at 6/12/2020  1:22 PM CDT -----  Regarding: FW: Non-Urgent Medical Question  Contact: [phone]   Pily can you have her run a temp basal of 70 % the night before surgery and the day of until she starts eating.    Ashvin  ----- Message -----  From: Kaity Ortega MA  Sent: 6/12/2020  11:26 AM CDT  To: HORACE Anglin  Subject: FW: Non-Urgent Medical Question                   ...  ----- Message -----  From: Carey Medina  Sent: 6/10/2020  10:26 PM CDT  To: [facility]  Subject: Non-Urgent Medical Question                      I wanted to let you know that I have my hysterectomy scheduled for Monday.   Is there anything I need to do with my pump?

## 2020-06-13 LAB
COVID LABCORP PRIORITY: NORMAL
SARS-COV-2 RNA RESP QL NAA+PROBE: NOT DETECTED

## 2020-06-15 ENCOUNTER — HOSPITAL ENCOUNTER (INPATIENT)
Facility: HOSPITAL | Age: 35
LOS: 1 days | Discharge: HOME | End: 2020-06-16
Attending: OBSTETRICS & GYNECOLOGY | Admitting: OBSTETRICS & GYNECOLOGY

## 2020-06-15 ENCOUNTER — ANESTHESIA (OUTPATIENT)
Dept: PERIOP | Facility: HOSPITAL | Age: 35
End: 2020-06-15

## 2020-06-15 DIAGNOSIS — Z90.710 S/P LAPAROSCOPIC HYSTERECTOMY: Primary | ICD-10-CM

## 2020-06-15 DIAGNOSIS — N94.10 FEMALE DYSPAREUNIA: ICD-10-CM

## 2020-06-15 DIAGNOSIS — R10.2 PELVIC PAIN: ICD-10-CM

## 2020-06-15 DIAGNOSIS — N93.9 ABNORMAL UTERINE BLEEDING (AUB): ICD-10-CM

## 2020-06-15 LAB
ABO GROUP BLD: NORMAL
ABO GROUP BLD: NORMAL
ANION GAP SERPL CALCULATED.3IONS-SCNC: 14 MMOL/L (ref 5–15)
BLD GP AB SCN SERPL QL: NEGATIVE
BUN BLD-MCNC: 13 MG/DL (ref 6–20)
BUN/CREAT SERPL: 17.1 (ref 7–25)
CALCIUM SPEC-SCNC: 9.5 MG/DL (ref 8.6–10.5)
CHLORIDE SERPL-SCNC: 98 MMOL/L (ref 98–107)
CO2 SERPL-SCNC: 20 MMOL/L (ref 22–29)
CREAT BLD-MCNC: 0.76 MG/DL (ref 0.57–1)
GFR SERPL CREATININE-BSD FRML MDRD: 87 ML/MIN/1.73
GLUCOSE BLD-MCNC: 210 MG/DL (ref 65–99)
GLUCOSE BLDC GLUCOMTR-MCNC: 250 MG/DL (ref 70–130)
GLUCOSE BLDC GLUCOMTR-MCNC: 252 MG/DL (ref 70–130)
GLUCOSE BLDC GLUCOMTR-MCNC: 309 MG/DL (ref 70–130)
HCG SERPL QL: NEGATIVE
Lab: NORMAL
POTASSIUM BLD-SCNC: 4.4 MMOL/L (ref 3.5–5.2)
RH BLD: POSITIVE
RH BLD: POSITIVE
SODIUM BLD-SCNC: 132 MMOL/L (ref 136–145)
T&S EXPIRATION DATE: NORMAL

## 2020-06-15 PROCEDURE — 86901 BLOOD TYPING SEROLOGIC RH(D): CPT

## 2020-06-15 PROCEDURE — 0UT94ZZ RESECTION OF UTERUS, PERCUTANEOUS ENDOSCOPIC APPROACH: ICD-10-PCS | Performed by: OBSTETRICS & GYNECOLOGY

## 2020-06-15 PROCEDURE — 25010000002 PROPOFOL 10 MG/ML EMULSION: Performed by: NURSE ANESTHETIST, CERTIFIED REGISTERED

## 2020-06-15 PROCEDURE — 25010000002 HYDROMORPHONE PER 4 MG: Performed by: NURSE ANESTHETIST, CERTIFIED REGISTERED

## 2020-06-15 PROCEDURE — 25010000002 KETOROLAC TROMETHAMINE PER 15 MG: Performed by: NURSE ANESTHETIST, CERTIFIED REGISTERED

## 2020-06-15 PROCEDURE — 25010000002 ONDANSETRON PER 1 MG: Performed by: NURSE ANESTHETIST, CERTIFIED REGISTERED

## 2020-06-15 PROCEDURE — 86900 BLOOD TYPING SEROLOGIC ABO: CPT | Performed by: OBSTETRICS & GYNECOLOGY

## 2020-06-15 PROCEDURE — 0UT64ZZ RESECTION OF LEFT FALLOPIAN TUBE, PERCUTANEOUS ENDOSCOPIC APPROACH: ICD-10-PCS | Performed by: OBSTETRICS & GYNECOLOGY

## 2020-06-15 PROCEDURE — 25010000002 HYDROMORPHONE 1 MG/ML SOLUTION: Performed by: NURSE ANESTHETIST, CERTIFIED REGISTERED

## 2020-06-15 PROCEDURE — 86900 BLOOD TYPING SEROLOGIC ABO: CPT

## 2020-06-15 PROCEDURE — 58552 LAPARO-VAG HYST INCL T/O: CPT | Performed by: OBSTETRICS & GYNECOLOGY

## 2020-06-15 PROCEDURE — 25010000002 FENTANYL CITRATE (PF) 100 MCG/2ML SOLUTION: Performed by: NURSE ANESTHETIST, CERTIFIED REGISTERED

## 2020-06-15 PROCEDURE — 0TJB8ZZ INSPECTION OF BLADDER, VIA NATURAL OR ARTIFICIAL OPENING ENDOSCOPIC: ICD-10-PCS | Performed by: OBSTETRICS & GYNECOLOGY

## 2020-06-15 PROCEDURE — 88342 IMHCHEM/IMCYTCHM 1ST ANTB: CPT

## 2020-06-15 PROCEDURE — 84703 CHORIONIC GONADOTROPIN ASSAY: CPT | Performed by: OBSTETRICS & GYNECOLOGY

## 2020-06-15 PROCEDURE — 0UT04ZZ RESECTION OF RIGHT OVARY, PERCUTANEOUS ENDOSCOPIC APPROACH: ICD-10-PCS | Performed by: OBSTETRICS & GYNECOLOGY

## 2020-06-15 PROCEDURE — 82962 GLUCOSE BLOOD TEST: CPT

## 2020-06-15 PROCEDURE — 93010 ELECTROCARDIOGRAM REPORT: CPT | Performed by: INTERNAL MEDICINE

## 2020-06-15 PROCEDURE — 0UT54ZZ RESECTION OF RIGHT FALLOPIAN TUBE, PERCUTANEOUS ENDOSCOPIC APPROACH: ICD-10-PCS | Performed by: OBSTETRICS & GYNECOLOGY

## 2020-06-15 PROCEDURE — 93005 ELECTROCARDIOGRAM TRACING: CPT | Performed by: ANESTHESIOLOGY

## 2020-06-15 PROCEDURE — 25010000002 KETOROLAC TROMETHAMINE PER 15 MG: Performed by: OBSTETRICS & GYNECOLOGY

## 2020-06-15 PROCEDURE — 25010000002 MIDAZOLAM PER 1 MG: Performed by: NURSE ANESTHETIST, CERTIFIED REGISTERED

## 2020-06-15 PROCEDURE — 88307 TISSUE EXAM BY PATHOLOGIST: CPT

## 2020-06-15 PROCEDURE — 25010000002 SUCCINYLCHOLINE PER 20 MG: Performed by: NURSE ANESTHETIST, CERTIFIED REGISTERED

## 2020-06-15 PROCEDURE — 25010000002 NEOSTIGMINE 4 MG/4ML SOLUTION PREFILLED SYRINGE: Performed by: NURSE ANESTHETIST, CERTIFIED REGISTERED

## 2020-06-15 PROCEDURE — 86850 RBC ANTIBODY SCREEN: CPT | Performed by: OBSTETRICS & GYNECOLOGY

## 2020-06-15 PROCEDURE — 25010000002 CEFAZOLIN PER 500 MG: Performed by: OBSTETRICS & GYNECOLOGY

## 2020-06-15 PROCEDURE — 94799 UNLISTED PULMONARY SVC/PX: CPT

## 2020-06-15 PROCEDURE — 80048 BASIC METABOLIC PNL TOTAL CA: CPT | Performed by: OBSTETRICS & GYNECOLOGY

## 2020-06-15 PROCEDURE — 86901 BLOOD TYPING SEROLOGIC RH(D): CPT | Performed by: OBSTETRICS & GYNECOLOGY

## 2020-06-15 DEVICE — FLOSEAL HEMOSTATIC MATRIX, 10ML
Type: IMPLANTABLE DEVICE | Site: ABDOMEN | Status: FUNCTIONAL
Brand: FLOSEAL HEMOSTATIC MATRIX

## 2020-06-15 DEVICE — ABSORBABLE RELOAD
Type: IMPLANTABLE DEVICE | Site: ABDOMEN | Status: FUNCTIONAL
Brand: V-LOC 180

## 2020-06-15 RX ORDER — SUCCINYLCHOLINE CHLORIDE 20 MG/ML
INJECTION INTRAMUSCULAR; INTRAVENOUS AS NEEDED
Status: DISCONTINUED | OUTPATIENT
Start: 2020-06-15 | End: 2020-06-15

## 2020-06-15 RX ORDER — SODIUM CHLORIDE, SODIUM LACTATE, POTASSIUM CHLORIDE, CALCIUM CHLORIDE 600; 310; 30; 20 MG/100ML; MG/100ML; MG/100ML; MG/100ML
125 INJECTION, SOLUTION INTRAVENOUS CONTINUOUS
Status: DISCONTINUED | OUTPATIENT
Start: 2020-06-15 | End: 2020-06-16 | Stop reason: HOSPADM

## 2020-06-15 RX ORDER — HYDROCHLOROTHIAZIDE 25 MG/1
25 TABLET ORAL DAILY
Status: DISCONTINUED | OUTPATIENT
Start: 2020-06-15 | End: 2020-06-16 | Stop reason: HOSPADM

## 2020-06-15 RX ORDER — HYDROMORPHONE HCL 110MG/55ML
PATIENT CONTROLLED ANALGESIA SYRINGE INTRAVENOUS AS NEEDED
Status: DISCONTINUED | OUTPATIENT
Start: 2020-06-15 | End: 2020-06-15 | Stop reason: SURG

## 2020-06-15 RX ORDER — ONDANSETRON 2 MG/ML
4 INJECTION INTRAMUSCULAR; INTRAVENOUS EVERY 6 HOURS PRN
Status: DISCONTINUED | OUTPATIENT
Start: 2020-06-15 | End: 2020-06-16 | Stop reason: HOSPADM

## 2020-06-15 RX ORDER — DEXAMETHASONE SODIUM PHOSPHATE 4 MG/ML
INJECTION, SOLUTION INTRA-ARTICULAR; INTRALESIONAL; INTRAMUSCULAR; INTRAVENOUS; SOFT TISSUE AS NEEDED
Status: DISCONTINUED | OUTPATIENT
Start: 2020-06-15 | End: 2020-06-15

## 2020-06-15 RX ORDER — KETOROLAC TROMETHAMINE 30 MG/ML
30 INJECTION, SOLUTION INTRAMUSCULAR; INTRAVENOUS EVERY 6 HOURS
Status: COMPLETED | OUTPATIENT
Start: 2020-06-15 | End: 2020-06-16

## 2020-06-15 RX ORDER — PROMETHAZINE HYDROCHLORIDE 25 MG/ML
6.25 INJECTION, SOLUTION INTRAMUSCULAR; INTRAVENOUS
Status: DISCONTINUED | OUTPATIENT
Start: 2020-06-15 | End: 2020-06-16 | Stop reason: HOSPADM

## 2020-06-15 RX ORDER — LISINOPRIL 10 MG/1
10 TABLET ORAL DAILY
Status: DISCONTINUED | OUTPATIENT
Start: 2020-06-15 | End: 2020-06-16 | Stop reason: HOSPADM

## 2020-06-15 RX ORDER — SODIUM CHLORIDE, SODIUM LACTATE, POTASSIUM CHLORIDE, CALCIUM CHLORIDE 600; 310; 30; 20 MG/100ML; MG/100ML; MG/100ML; MG/100ML
125 INJECTION, SOLUTION INTRAVENOUS CONTINUOUS
Status: DISCONTINUED | OUTPATIENT
Start: 2020-06-15 | End: 2020-06-15

## 2020-06-15 RX ORDER — PROMETHAZINE HYDROCHLORIDE 12.5 MG/1
12.5 SUPPOSITORY RECTAL EVERY 6 HOURS PRN
Status: DISCONTINUED | OUTPATIENT
Start: 2020-06-15 | End: 2020-06-16 | Stop reason: HOSPADM

## 2020-06-15 RX ORDER — PROPOFOL 10 MG/ML
VIAL (ML) INTRAVENOUS AS NEEDED
Status: DISCONTINUED | OUTPATIENT
Start: 2020-06-15 | End: 2020-06-15 | Stop reason: SURG

## 2020-06-15 RX ORDER — SODIUM CHLORIDE 9 MG/ML
INJECTION, SOLUTION INTRAVENOUS CONTINUOUS PRN
Status: DISCONTINUED | OUTPATIENT
Start: 2020-06-15 | End: 2020-06-15 | Stop reason: SURG

## 2020-06-15 RX ORDER — PROMETHAZINE HYDROCHLORIDE 12.5 MG/1
12.5 TABLET ORAL EVERY 6 HOURS PRN
Status: DISCONTINUED | OUTPATIENT
Start: 2020-06-15 | End: 2020-06-16 | Stop reason: HOSPADM

## 2020-06-15 RX ORDER — ROCURONIUM BROMIDE 10 MG/ML
INJECTION, SOLUTION INTRAVENOUS AS NEEDED
Status: DISCONTINUED | OUTPATIENT
Start: 2020-06-15 | End: 2020-06-15 | Stop reason: SURG

## 2020-06-15 RX ORDER — LIDOCAINE HYDROCHLORIDE 20 MG/ML
INJECTION, SOLUTION INFILTRATION; PERINEURAL AS NEEDED
Status: DISCONTINUED | OUTPATIENT
Start: 2020-06-15 | End: 2020-06-15 | Stop reason: SURG

## 2020-06-15 RX ORDER — SODIUM CHLORIDE 0.9 % (FLUSH) 0.9 %
10 SYRINGE (ML) INJECTION AS NEEDED
Status: DISCONTINUED | OUTPATIENT
Start: 2020-06-15 | End: 2020-06-15 | Stop reason: HOSPADM

## 2020-06-15 RX ORDER — BISACODYL 10 MG
10 SUPPOSITORY, RECTAL RECTAL DAILY PRN
Status: DISCONTINUED | OUTPATIENT
Start: 2020-06-15 | End: 2020-06-16 | Stop reason: HOSPADM

## 2020-06-15 RX ORDER — ONDANSETRON 2 MG/ML
4 INJECTION INTRAMUSCULAR; INTRAVENOUS ONCE AS NEEDED
Status: DISCONTINUED | OUTPATIENT
Start: 2020-06-15 | End: 2020-06-15 | Stop reason: HOSPADM

## 2020-06-15 RX ORDER — CEFAZOLIN SODIUM IN 0.9 % NACL 3 G/100 ML
3 INTRAVENOUS SOLUTION, PIGGYBACK (ML) INTRAVENOUS ONCE
Status: COMPLETED | OUTPATIENT
Start: 2020-06-15 | End: 2020-06-15

## 2020-06-15 RX ORDER — ALBUTEROL SULFATE 2.5 MG/3ML
2.5 SOLUTION RESPIRATORY (INHALATION) ONCE AS NEEDED
Status: DISCONTINUED | OUTPATIENT
Start: 2020-06-15 | End: 2020-06-15 | Stop reason: HOSPADM

## 2020-06-15 RX ORDER — ONDANSETRON 4 MG/1
4 TABLET, FILM COATED ORAL EVERY 6 HOURS PRN
Status: DISCONTINUED | OUTPATIENT
Start: 2020-06-15 | End: 2020-06-16 | Stop reason: HOSPADM

## 2020-06-15 RX ORDER — NICOTINE POLACRILEX 4 MG
15 LOZENGE BUCCAL AS NEEDED
Status: DISCONTINUED | OUTPATIENT
Start: 2020-06-15 | End: 2020-06-16 | Stop reason: HOSPADM

## 2020-06-15 RX ORDER — DEXTROSE MONOHYDRATE 25 G/50ML
25 INJECTION, SOLUTION INTRAVENOUS
Status: DISCONTINUED | OUTPATIENT
Start: 2020-06-15 | End: 2020-06-16 | Stop reason: HOSPADM

## 2020-06-15 RX ORDER — ONDANSETRON 2 MG/ML
INJECTION INTRAMUSCULAR; INTRAVENOUS AS NEEDED
Status: DISCONTINUED | OUTPATIENT
Start: 2020-06-15 | End: 2020-06-15 | Stop reason: SURG

## 2020-06-15 RX ORDER — SUCCINYLCHOLINE CHLORIDE 20 MG/ML
INJECTION INTRAMUSCULAR; INTRAVENOUS AS NEEDED
Status: DISCONTINUED | OUTPATIENT
Start: 2020-06-15 | End: 2020-06-15 | Stop reason: SURG

## 2020-06-15 RX ORDER — HYDROCODONE BITARTRATE AND ACETAMINOPHEN 5; 325 MG/1; MG/1
1 TABLET ORAL EVERY 4 HOURS PRN
Status: DISCONTINUED | OUTPATIENT
Start: 2020-06-15 | End: 2020-06-16 | Stop reason: HOSPADM

## 2020-06-15 RX ORDER — NEOSTIGMINE METHYLSULFATE 4 MG/4 ML
SYRINGE (ML) INTRAVENOUS AS NEEDED
Status: DISCONTINUED | OUTPATIENT
Start: 2020-06-15 | End: 2020-06-15 | Stop reason: SURG

## 2020-06-15 RX ORDER — BUPIVACAINE HCL/0.9 % NACL/PF 0.1 %
2 PLASTIC BAG, INJECTION (ML) EPIDURAL EVERY 8 HOURS
Status: COMPLETED | OUTPATIENT
Start: 2020-06-15 | End: 2020-06-16

## 2020-06-15 RX ORDER — FENTANYL CITRATE 50 UG/ML
INJECTION, SOLUTION INTRAMUSCULAR; INTRAVENOUS AS NEEDED
Status: DISCONTINUED | OUTPATIENT
Start: 2020-06-15 | End: 2020-06-15 | Stop reason: SURG

## 2020-06-15 RX ORDER — SODIUM CHLORIDE 0.9 % (FLUSH) 0.9 %
3 SYRINGE (ML) INJECTION EVERY 12 HOURS SCHEDULED
Status: DISCONTINUED | OUTPATIENT
Start: 2020-06-15 | End: 2020-06-15 | Stop reason: HOSPADM

## 2020-06-15 RX ORDER — KETOROLAC TROMETHAMINE 30 MG/ML
INJECTION, SOLUTION INTRAMUSCULAR; INTRAVENOUS AS NEEDED
Status: DISCONTINUED | OUTPATIENT
Start: 2020-06-15 | End: 2020-06-15 | Stop reason: SURG

## 2020-06-15 RX ORDER — MIDAZOLAM HYDROCHLORIDE 1 MG/ML
INJECTION INTRAMUSCULAR; INTRAVENOUS AS NEEDED
Status: DISCONTINUED | OUTPATIENT
Start: 2020-06-15 | End: 2020-06-15 | Stop reason: SURG

## 2020-06-15 RX ORDER — ZOLPIDEM TARTRATE 5 MG/1
5 TABLET ORAL NIGHTLY PRN
Status: DISCONTINUED | OUTPATIENT
Start: 2020-06-15 | End: 2020-06-16 | Stop reason: HOSPADM

## 2020-06-15 RX ORDER — NICOTINE POLACRILEX 4 MG
15 LOZENGE BUCCAL
Status: DISCONTINUED | OUTPATIENT
Start: 2020-06-15 | End: 2020-06-16 | Stop reason: HOSPADM

## 2020-06-15 RX ADMIN — PROPOFOL 150 MG: 10 INJECTION, EMULSION INTRAVENOUS at 10:59

## 2020-06-15 RX ADMIN — MIDAZOLAM HYDROCHLORIDE 2 MG: 2 INJECTION, SOLUTION INTRAMUSCULAR; INTRAVENOUS at 10:57

## 2020-06-15 RX ADMIN — ZOLPIDEM TARTRATE 5 MG: 5 TABLET ORAL at 21:40

## 2020-06-15 RX ADMIN — ROCURONIUM BROMIDE 25 MG: 10 INJECTION INTRAVENOUS at 11:05

## 2020-06-15 RX ADMIN — HYDROMORPHONE HYDROCHLORIDE 0.5 MG: 2 INJECTION, SOLUTION INTRAMUSCULAR; INTRAVENOUS; SUBCUTANEOUS at 12:48

## 2020-06-15 RX ADMIN — ONDANSETRON 8 MG: 2 INJECTION INTRAMUSCULAR; INTRAVENOUS at 12:40

## 2020-06-15 RX ADMIN — ROCURONIUM BROMIDE 20 MG: 10 INJECTION INTRAVENOUS at 11:15

## 2020-06-15 RX ADMIN — KETOROLAC TROMETHAMINE 30 MG: 30 INJECTION, SOLUTION INTRAMUSCULAR; INTRAVENOUS at 21:40

## 2020-06-15 RX ADMIN — HYDROCODONE BITARTRATE AND ACETAMINOPHEN 1 TABLET: 5; 325 TABLET ORAL at 15:49

## 2020-06-15 RX ADMIN — KETOROLAC TROMETHAMINE 30 MG: 30 INJECTION, SOLUTION INTRAMUSCULAR at 12:40

## 2020-06-15 RX ADMIN — SUCCINYLCHOLINE CHLORIDE 200 MG: 20 INJECTION, SOLUTION INTRAMUSCULAR; INTRAVENOUS at 10:59

## 2020-06-15 RX ADMIN — CEFAZOLIN 2 G: 10 INJECTION, POWDER, FOR SOLUTION INTRAVENOUS; PARENTERAL at 18:10

## 2020-06-15 RX ADMIN — LIDOCAINE HYDROCHLORIDE 100 MG: 20 INJECTION, SOLUTION INFILTRATION; PERINEURAL at 10:59

## 2020-06-15 RX ADMIN — FENTANYL CITRATE 100 MCG: 50 INJECTION, SOLUTION INTRAMUSCULAR; INTRAVENOUS at 10:59

## 2020-06-15 RX ADMIN — FENTANYL CITRATE 50 MCG: 50 INJECTION, SOLUTION INTRAMUSCULAR; INTRAVENOUS at 11:38

## 2020-06-15 RX ADMIN — HYDROMORPHONE HYDROCHLORIDE 0.5 MG: 1 INJECTION, SOLUTION INTRAMUSCULAR; INTRAVENOUS; SUBCUTANEOUS at 13:22

## 2020-06-15 RX ADMIN — HYDROMORPHONE HYDROCHLORIDE 0.5 MG: 2 INJECTION, SOLUTION INTRAMUSCULAR; INTRAVENOUS; SUBCUTANEOUS at 11:56

## 2020-06-15 RX ADMIN — HYDROMORPHONE HYDROCHLORIDE 0.5 MG: 1 INJECTION, SOLUTION INTRAMUSCULAR; INTRAVENOUS; SUBCUTANEOUS at 13:37

## 2020-06-15 RX ADMIN — Medication 3 MG: at 12:19

## 2020-06-15 RX ADMIN — SODIUM CHLORIDE, POTASSIUM CHLORIDE, SODIUM LACTATE AND CALCIUM CHLORIDE 125 ML/HR: 600; 310; 30; 20 INJECTION, SOLUTION INTRAVENOUS at 09:43

## 2020-06-15 RX ADMIN — HYDROCHLOROTHIAZIDE 25 MG: 25 TABLET ORAL at 15:49

## 2020-06-15 RX ADMIN — SODIUM CHLORIDE: 900 INJECTION, SOLUTION INTRAVENOUS at 12:02

## 2020-06-15 RX ADMIN — CEFAZOLIN 3 G: 1 INJECTION, POWDER, FOR SOLUTION INTRAMUSCULAR; INTRAVENOUS; PARENTERAL at 11:01

## 2020-06-15 RX ADMIN — HYDROMORPHONE HYDROCHLORIDE 0.5 MG: 2 INJECTION, SOLUTION INTRAMUSCULAR; INTRAVENOUS; SUBCUTANEOUS at 12:43

## 2020-06-15 RX ADMIN — ROCURONIUM BROMIDE 5 MG: 10 INJECTION INTRAVENOUS at 10:58

## 2020-06-15 RX ADMIN — HYDROMORPHONE HYDROCHLORIDE 0.5 MG: 2 INJECTION, SOLUTION INTRAMUSCULAR; INTRAVENOUS; SUBCUTANEOUS at 12:09

## 2020-06-15 RX ADMIN — Medication: at 17:50

## 2020-06-15 RX ADMIN — SODIUM CHLORIDE, POTASSIUM CHLORIDE, SODIUM LACTATE AND CALCIUM CHLORIDE 125 ML/HR: 600; 310; 30; 20 INJECTION, SOLUTION INTRAVENOUS at 15:51

## 2020-06-15 RX ADMIN — FENTANYL CITRATE 50 MCG: 50 INJECTION, SOLUTION INTRAMUSCULAR; INTRAVENOUS at 11:20

## 2020-06-15 RX ADMIN — LISINOPRIL 10 MG: 10 TABLET ORAL at 15:50

## 2020-06-15 RX ADMIN — KETOROLAC TROMETHAMINE 30 MG: 30 INJECTION, SOLUTION INTRAMUSCULAR; INTRAVENOUS at 15:58

## 2020-06-15 RX ADMIN — GLYCOPYRROLATE 0.4 MG: 0.2 INJECTION, SOLUTION INTRAMUSCULAR; INTRAVITREAL at 12:19

## 2020-06-15 RX ADMIN — HYDROCODONE BITARTRATE AND ACETAMINOPHEN 1 TABLET: 5; 325 TABLET ORAL at 20:04

## 2020-06-15 NOTE — ANESTHESIA PREPROCEDURE EVALUATION
Anesthesia Evaluation     Patient summary reviewed   NPO Solid Status: > 8 hours             Airway   Mallampati: II  TM distance: <3 FB  Neck ROM: full  Large neck circumference  Dental    (+) poor dentition    Pulmonary - normal exam   (+) recent URI resolved,   Cardiovascular - normal exam  Exercise tolerance: good (4-7 METS)    (+) hypertension,       Neuro/Psych  GI/Hepatic/Renal/Endo    (+) obesity, morbid obesity, GERD,  diabetes mellitus,     Musculoskeletal     Abdominal    Substance History      OB/GYN          Other                        Anesthesia Plan    ASA 3     general     intravenous induction     Anesthetic plan, all risks, benefits, and alternatives have been provided, discussed and informed consent has been obtained with: patient.

## 2020-06-15 NOTE — OP NOTE
OPERATIVE NOTE  Carey Medina  1985  6/15/2020    PREOP DIAGNOSES:  Pelvic pain [R10.2]  Abnormal uterine bleeding (AUB) [N93.9]  Female dyspareunia [N94.10]    POSTOP DIAGNOSES:  Post-Op Diagnosis Codes:     * Pelvic pain [R10.2]     * Abnormal uterine bleeding (AUB) [N93.9]     * Female dyspareunia [N94.10]      Procedure(s):  TOTAL LAPAROSCOPIC HYSTERECTOMY RIGHT SALPINGO OOPHORECTOMY, left salpingectomy, cystoscopy    SURGEON: Shaq Cortez DO, FACOG    ASSISTANT: Malinda Gonzalez CSA      STAFF:   Circulator: Neli Byers RN; Vivian Wilhelm RN  Scrub Person: Merly Gonzalez; Amie Chamorro; Sonja Pal  Assistant: Malinda Gonzalez CSA    ANESTHESIA: Choice    ANESTHESIA STAFF:  Anesthesiologist: John Gonzalez MD  CRNA: Lisandra Melara CRNA  Student Nurse Anesthetist: Reena Mcnamara    ESTIMATED BLOOD LOSS: 100 ml     SPECIMEN:   ID Type Source Tests Collected by Time   A (Not marked as sent) : Uterus, cervix, right fallopian tube and ovary, left fallopian tube.  Tissue Uterus, Cervix, R Fallopian Tube, R Ovary TISSUE PATHOLOGY EXAM Shaq Cortez DO 6/15/2020 1242       FINDINGS: Normal-appearing uterus and cervix and ovaries.  Left lobe and tube was absent right fallopian tube was clubbed at the end.  Normal-appearing bladder with no sutures visualized and E flux noted from both ureteral orifices    COMPLICATIONS: None    DESCRIPTION OF OPERATION:      After obtaining informed consent, the patient was taken to the operating room where General anesthesia was found to be adequate.  She was then prepped and draped in the normal sterile fashion in the low lithotomy position. A final time out was performed. Preoperative antibiotics given.    A bivaled speculum was placed into the vagina, and the cervix visualized with findings as noted above. A V-Care uterine manipulator was placed without difficulty. A raman catheter was then place.         Attention was then turned to the abdomen. A  5mm incision was then made in the umbilicus and a 5mm trocar was placed under visualization. The abdomen was then insuflated and pelvic survery performed. A second 5mm trocar was place at the level of the umbilicus on the patient’s left follow by the same thing on the patient’s right. The patient was placed into trendeleberg position.    The right round ligament was cauterized and transected with the Enseal device. A bladder flap on the right was then carried to the midline. The left round ligament was cauterized with the Enseal device and transected. The remainder of the bladder flap was created on the left and connected at the mid line. The right IP ligament was then cauterized with bipolar energy and transected. The broad ligament on the right was then cauterized with bipolar and transected. The right uterine artery was identified and cauterized with bipolar energy and transected.     The bladder was then dissected off of the uterus. The left uteroovarian ligament was then cauterized and transected with Enseal. The broad ligament on the left was then cauterized with bipolar energy and transected. The left uterine artery was identified, cauterized with bipolar energy and transected.    The bilateral cardinal ligaments were then cauterized and transected with the Enseal device to the level of the V-Care surgical cup. The entire cup was able to be felt laparoscopically. A colpotomy was performed 360 degrees around the entire cervix and the cervix and uterus were amputated. The uterus was then pulled into the vagina to maintain pneumoperitoneum. The umbilical port site was then extended to 11mm and the 5mm trocar was removed. An 11mm tocar was then placed and the vagina cuff was closed with 2-0 V-lock suture in a running fashion using an endostitch device. Good hemostastic was noted.  The pelvis was suction irrigated and FloSeal was placed over the vaginal cuff excellent hemostasis was noted.  The 11 mm umbilical  port was removed and closed with 0 Vicryl using a Jordan Giang device the abdomen was then desufflated and the trochars removed without difficulty.    Cystoscopy was performed with normal appearing bladder and efflux of urine noted from both UO’s. No sutures seen in the bladder.  A final vaginal sweep was performed. The trocar sites were closed with 4-0 monocryl and dermabond.    The patient tolerated the procedure well.  Sponge, lap, and needle counts were correct times two.  The patient was taken to the recovery room in stable condition.    This document has been electronically signed by Shaq Cortez DO on Cristina 15, 2020 12:59

## 2020-06-15 NOTE — PLAN OF CARE
Problem: Patient Care Overview  Goal: Plan of Care Review  Outcome: Ongoing (interventions implemented as appropriate)  Flowsheets  Taken 6/15/2020 1842  Progress: improving  Outcome Summary: VSS; pain controlled with prn and scheduled meds.  patient ambulating to bathroom, voiding.  Using her own insulin pump.  Taken 6/15/2020 2007  Plan of Care Reviewed With: patient

## 2020-06-15 NOTE — ANESTHESIA POSTPROCEDURE EVALUATION
Patient: Carey Medina    Procedure Summary     Date:  06/15/20 Room / Location:  [facility] OR [address] [place] [address] OR    Anesthesia Start:  1053 Anesthesia Stop:  1256    Procedure:  TOTAL LAPAROSCOPIC HYSTERECTOMY RIGHT SALPINGO OOPHORECTOMY, left salpingectomy (Bilateral Abdomen) Diagnosis:       Pelvic pain      Abnormal uterine bleeding (AUB)      Female dyspareunia      (Pelvic pain [R10.2])      (Abnormal uterine bleeding (AUB) [N93.9])      (Female dyspareunia [N94.10])    Surgeon:  Shaq Cortez DO Provider:  John Gonzalez MD    Anesthesia Type:  general ASA Status:  3          Anesthesia Type: general    Vitals  No vitals data found for the desired time range.          Post Anesthesia Care and Evaluation    Patient location during evaluation: bedside  Patient participation: waiting for patient participation  Level of consciousness: sleepy but conscious  Pain score: 0  Pain management: adequate  Airway patency: patent  Anesthetic complications: No anesthetic complications  PONV Status: none  Cardiovascular status: acceptable  Respiratory status: acceptable  Hydration status: acceptable

## 2020-06-15 NOTE — ANESTHESIA PROCEDURE NOTES
Airway  Urgency: elective    Date/Time: 6/15/2020 11:00 AM  Airway not difficult    General Information and Staff    Patient location during procedure: OR    Indications and Patient Condition  Indications for airway management: airway protection    Preoxygenated: yes  MILS maintained throughout  Mask difficulty assessment: 0 - not attempted    Final Airway Details  Final airway type: endotracheal airway      Successful airway: ETT  Cuffed: yes   Successful intubation technique: direct laryngoscopy  Facilitating devices/methods: intubating stylet  Endotracheal tube insertion site: oral  Blade: Nasreen  Blade size: 3  ETT size (mm): 7.0  Cormack-Lehane Classification: grade I - full view of glottis  Placement verified by: chest auscultation and capnometry   Measured from: lips  ETT/EBT  to lips (cm): 22  Number of attempts at approach: 1  Assessment: lips, teeth, and gum same as pre-op and atraumatic intubation

## 2020-06-16 VITALS
HEART RATE: 99 BPM | DIASTOLIC BLOOD PRESSURE: 72 MMHG | OXYGEN SATURATION: 97 % | RESPIRATION RATE: 18 BRPM | BODY MASS INDEX: 39.56 KG/M2 | SYSTOLIC BLOOD PRESSURE: 136 MMHG | TEMPERATURE: 98.1 F | HEIGHT: 64 IN | WEIGHT: 231.7 LBS

## 2020-06-16 LAB
DEPRECATED RDW RBC AUTO: 38 FL (ref 37–54)
ERYTHROCYTE [DISTWIDTH] IN BLOOD BY AUTOMATED COUNT: 13 % (ref 12.3–15.4)
HCT VFR BLD AUTO: 35 % (ref 34–46.6)
HGB BLD-MCNC: 11.8 G/DL (ref 12–15.9)
MCH RBC QN AUTO: 27.3 PG (ref 26.6–33)
MCHC RBC AUTO-ENTMCNC: 33.7 G/DL (ref 31.5–35.7)
MCV RBC AUTO: 81 FL (ref 79–97)
PLATELET # BLD AUTO: 414 10*3/MM3 (ref 140–450)
PMV BLD AUTO: 9.9 FL (ref 6–12)
RBC # BLD AUTO: 4.32 10*6/MM3 (ref 3.77–5.28)
WBC NRBC COR # BLD: 15.32 10*3/MM3 (ref 3.4–10.8)

## 2020-06-16 PROCEDURE — 85027 COMPLETE CBC AUTOMATED: CPT | Performed by: OBSTETRICS & GYNECOLOGY

## 2020-06-16 PROCEDURE — 99024 POSTOP FOLLOW-UP VISIT: CPT | Performed by: OBSTETRICS & GYNECOLOGY

## 2020-06-16 PROCEDURE — 25010000002 KETOROLAC TROMETHAMINE PER 15 MG: Performed by: OBSTETRICS & GYNECOLOGY

## 2020-06-16 PROCEDURE — 25010000002 CEFAZOLIN PER 500 MG: Performed by: OBSTETRICS & GYNECOLOGY

## 2020-06-16 RX ORDER — IBUPROFEN 800 MG/1
800 TABLET ORAL EVERY 6 HOURS PRN
Qty: 60 TABLET | Refills: 2 | Status: SHIPPED | OUTPATIENT
Start: 2020-06-16 | End: 2021-03-04

## 2020-06-16 RX ORDER — HYDROCODONE BITARTRATE AND ACETAMINOPHEN 10; 325 MG/1; MG/1
1 TABLET ORAL EVERY 6 HOURS PRN
Qty: 18 TABLET | Refills: 0 | Status: SHIPPED | OUTPATIENT
Start: 2020-06-16 | End: 2020-06-23 | Stop reason: SDUPTHER

## 2020-06-16 RX ADMIN — MAGNESIUM HYDROXIDE 10 ML: 2400 SUSPENSION ORAL at 10:30

## 2020-06-16 RX ADMIN — HYDROCHLOROTHIAZIDE 25 MG: 25 TABLET ORAL at 10:30

## 2020-06-16 RX ADMIN — CEFAZOLIN 2 G: 10 INJECTION, POWDER, FOR SOLUTION INTRAVENOUS; PARENTERAL at 03:04

## 2020-06-16 RX ADMIN — KETOROLAC TROMETHAMINE 30 MG: 30 INJECTION, SOLUTION INTRAMUSCULAR; INTRAVENOUS at 04:08

## 2020-06-16 RX ADMIN — HYDROCODONE BITARTRATE AND ACETAMINOPHEN 1 TABLET: 5; 325 TABLET ORAL at 04:08

## 2020-06-16 RX ADMIN — KETOROLAC TROMETHAMINE 30 MG: 30 INJECTION, SOLUTION INTRAMUSCULAR; INTRAVENOUS at 10:30

## 2020-06-16 RX ADMIN — LISINOPRIL 10 MG: 10 TABLET ORAL at 10:30

## 2020-06-16 NOTE — DISCHARGE SUMMARY
[facility]  Carey Medina  : 1985  MRN: [id number]  CSN: [id number]    Discharge Summary      Date of Admission: 6/15/2020   Date of Discharge: 2020   Discharge Diagnosis: 1.  Abnormal uterine bleeding and pelvic pain   Procedures Performed: Procedure(s):  TOTAL LAPAROSCOPIC HYSTERECTOMY RIGHT SALPINGO OOPHORECTOMY, left salpingectomy      Brief History: Patient is a 34 y.o.who presented to hospital for scheduled hysterectomy.  Patient had uncomplicated surgery and uncomplicated post operative course.   Hospital Course: Her hospital course has been uneventful.  Today, on postoperative day # 1, she is tolerating a regular diet, ambulating without assistance, and desires to go home.  Cabral catheter was removed this morning, and she has urinated without difficulty. She has had no fever, and her pain is controlled.  She has had no nausea or vomiting.   Pending Studies: Tissue Pathology   Condition at Discharge: Stable   Discharge Diet: Diet Instructions     Diet: Regular      Discharge Diet:  Regular         Discharge Activity: Activity Instructions     Bathing Restrictions      Type of Restriction:  Bathing    Bathing Restrictions:  Other    Explain Bathing Restrictions:  No soaking in bathtub for 4 weeks, showers are fine.    Driving Restrictions      Type of Restriction:  Driving    Driving Restrictions:  No Driving (Time Limited)    Length:  Other    Indicate Length of Restriction:  No driving for 1 week or while on narcotic pain medications. Riding is car is fine.    Lifting Restrictions      Type of Restriction:  Lifting    Lifting Restrictions:  Other    Explain Lifing Restrictions:  No lifting greater than 15 pound until cleared by surgeon.    Pelvic Rest      Nothing in the vagina for 6 weeks to include tampons or intercourse.    Sexual Activity Restrictions      Type of Restriction:  Sex    Explain Sexual Activity Restrictions:  No sexual intercourse for at least 6 weeks        "  Discharge Medications:    Your medication list      START taking these medications      Instructions Last Dose Given Next Dose Due   ibuprofen 800 MG tablet  Commonly known as:  ADVIL,MOTRIN      Take 1 tablet by mouth Every 6 (Six) Hours As Needed for Mild Pain .          CHANGE how you take these medications      Instructions Last Dose Given Next Dose Due   HYDROcodone-acetaminophen 5-325 MG per tablet  Commonly known as:  [place]  What changed:  Another medication with the same name was added. Make sure you understand how and when to take each.      Take 1 tablet by mouth Every 6 (Six) Hours As Needed for Severe Pain .       HYDROcodone-acetaminophen  MG per tablet  Commonly known as:  [place]  What changed:  You were already taking a medication with the same name, and this prescription was added. Make sure you understand how and when to take each.      Take 1 tablet by mouth Every 6 (Six) Hours As Needed for Moderate Pain .          CONTINUE taking these medications      Instructions Last Dose Given Next Dose Due   BD Ultra-Fine Lancets lancets      33 gauge. Use 4 times daily       Lancets 30G misc      Testing 6 times daily  E11.9       glucagon 1 MG injection  Commonly known as:  Glucagon Emergency      Inject 1 mg under the skin into the appropriate area as directed 1 (One) Time As Needed for Low Blood Sugar for up to 1 dose.       glucose blood test strip  Commonly known as:  Proviation Contour Next Test      Use as instructed - test 5 times daily       hydroCHLOROthiazide 25 MG tablet  Commonly known as:  HYDRODIURIL      Take 25 mg by mouth Daily.       insulin aspart 100 UNIT/ML injection  Commonly known as:  novoLOG      120 units Inject through insulin pump       lisinopril 10 MG tablet  Commonly known as:  PRINIVIL,ZESTRIL      Take 10 mg by mouth Daily.       medroxyPROGESTERone 5 MG tablet  Commonly known as:  PROVERA      Take 5 mg by mouth 2 (Two) Times a Day.       Pen Needles 5/16\" 31G X 8 MM " misc                 Where to Get Your Medications      These medications were sent to [facility] [place] [id number]    Hours:  Monday through Friday 7:00am to 5:00pm Phone:  [phone] ·   HYDROcodone-acetaminophen  MG per tablet  · ibuprofen 800 MG tablet        Discharge Disposition: home   Follow-up: Future Appointments   Date Time Provider Department Center   8/5/2020  9:30 AM Ashvin Joaquin APRN [facility] None            This note has been electronically signed.    Shaq Cortez,   June 16, 2020  7:40 AM

## 2020-06-16 NOTE — PROGRESS NOTES
[facility]  Carey Medina  : 1985  MRN: [id number]  CSN: [id number]    Post-operative Day #1  Subjective   Her pain is well controlled.  Vaginal bleeding is improving.  She is not passing gas and has not had a bowel movement. Patient is urinating without difficulty. Overall feeling well.  Blood sugar levels seem to be slowly improving.  Patient did have levels in the 200s yesterday.  This morning blood sugar was at 180.  Patient is using her pump to control sugar levels.  I explained to patient that I felt that this is likely secondary to the surgery and the stress of surgery and anticipate blood sugar levels to keep improving over the next several days.     Objective     Min/max vitals past 24 hours:   Temp  Min: 97.7 °F (36.5 °C)  Max: 99.8 °F (37.7 °C)  BP  Min: 107/83  Max: 154/105  Pulse  Min: 97  Max: 117  Pulse  Min: 97  Max: 117        General: well developed; well nourished  no acute distress   Abdomen: no umbilical or inguinal hernias are present  no hepato-splenomegaly  incision is covered by bandage and are not saturated. Bandages look clean  mild tenderness to palpation   Pelvic: Not performed   Ext: Calves NT     Lab Results   Component Value Date    WBC 12.0 (H) 2018    HGB 10.3 (L) 2018    HCT 32.0 (L) 2018    MCV 82.7 2018     (H) 2018    RH Positive 06/15/2020        Assessment   1. S/P TOTAL LAPAROSCOPIC HYSTERECTOMY RIGHT SALPINGO OOPHORECTOMY, left salpingectomy  2. Patient healing and improving appropriately, some blood sugar control issues although improving at this time.  Appropriate drop in H&H.  Vital signs remained stable.     Plan   1. Continue routine post-operative care  2. Ambulate  3. Advance diet   4. Encourage ambulation          This document has been electronically signed by Jesse Siddiqui MD on 2020 06:25      EMR Dragon/Transcription disclaimer:   Much of this encounter note is an electronic  transcription/translation of spoken language to printed text. The electronic translation of spoken language may permit erroneous, or at times, nonsensical words or phrases to be inadvertently transcribed; Although I have reviewed the note for such errors, some may still exist.    I have seen and evaluated the patient.  I have discussed the case with the resident. I have reviewed the notes, assessment and plan, and/or procedures performed by the resident. I concur with the resident’s documentation with edits.        This document has been electronically signed by Shaq Cortez DO on June 16, 2020 07:35

## 2020-06-16 NOTE — PLAN OF CARE
Problem: Patient Care Overview  Goal: Plan of Care Review  Outcome: Ongoing (interventions implemented as appropriate)  Flowsheets  Taken 6/15/2020 1842 by Jenifer Kendall RN  Progress: improving  Taken 6/15/2020 2004 by Jasmin Salcedo, RN  Plan of Care Reviewed With: spouse;patient  Taken 6/16/2020 0550 by Jasmin Salcedo, RN  Outcome Summary: vss; pain controlled with prn and scheduled meds; pt ambulating to bathroom; voids without difficulty; using own insulin pump

## 2020-06-17 ENCOUNTER — READMISSION MANAGEMENT (OUTPATIENT)
Dept: CALL CENTER | Facility: HOSPITAL | Age: 35
End: 2020-06-17

## 2020-06-17 NOTE — OUTREACH NOTE
Prep Survey      Responses   [facility] facility patient discharged from?  [place]   Is LACE score < 7 ?  No   Eligibility  Readm Mgmt   Discharge diagnosis  Abnormal uterine bleeding and pelvic pain   Does the patient have one of the following disease processes/diagnoses(primary or secondary)?  General Surgery   Does the patient have Home health ordered?  No   Is there a DME ordered?  No   Prep survey completed?  Yes          Isabel Gregory RN

## 2020-06-19 ENCOUNTER — READMISSION MANAGEMENT (OUTPATIENT)
Dept: CALL CENTER | Facility: HOSPITAL | Age: 35
End: 2020-06-19

## 2020-06-19 NOTE — OUTREACH NOTE
General Surgery Week 1 Survey      Responses   [facility] patient discharged from?  [place]   Does the patient have one of the following disease processes/diagnoses(primary or secondary)?  General Surgery   Is there a successful TCM telephone encounter documented?  No   Week 1 attempt successful?  No   Unsuccessful attempts  Attempt 1          Ayana Bahena RN

## 2020-06-20 ENCOUNTER — READMISSION MANAGEMENT (OUTPATIENT)
Dept: CALL CENTER | Facility: HOSPITAL | Age: 35
End: 2020-06-20

## 2020-06-20 NOTE — OUTREACH NOTE
General Surgery Week 1 Survey      Responses   [facility] [place] patient discharged from?  [place]   Does the patient have one of the following disease processes/diagnoses(primary or secondary)?  General Surgery   Is there a successful TCM telephone encounter documented?  No   Week 1 attempt successful?  No   Unsuccessful attempts  Attempt 2          Radha Lynne RN

## 2020-06-23 ENCOUNTER — READMISSION MANAGEMENT (OUTPATIENT)
Dept: CALL CENTER | Facility: HOSPITAL | Age: 35
End: 2020-06-23

## 2020-06-23 ENCOUNTER — OFFICE VISIT (OUTPATIENT)
Dept: OBSTETRICS AND GYNECOLOGY | Facility: CLINIC | Age: 35
End: 2020-06-23

## 2020-06-23 VITALS
DIASTOLIC BLOOD PRESSURE: 82 MMHG | SYSTOLIC BLOOD PRESSURE: 125 MMHG | WEIGHT: 236 LBS | HEIGHT: 64 IN | BODY MASS INDEX: 40.29 KG/M2

## 2020-06-23 DIAGNOSIS — Z09 POSTOPERATIVE FOLLOW-UP: Primary | ICD-10-CM

## 2020-06-23 DIAGNOSIS — Z90.710 S/P LAPAROSCOPIC HYSTERECTOMY: ICD-10-CM

## 2020-06-23 PROCEDURE — 99024 POSTOP FOLLOW-UP VISIT: CPT | Performed by: OBSTETRICS & GYNECOLOGY

## 2020-06-23 RX ORDER — HYDROCODONE BITARTRATE AND ACETAMINOPHEN 10; 325 MG/1; MG/1
1 TABLET ORAL EVERY 6 HOURS PRN
Qty: 10 TABLET | Refills: 0 | Status: SHIPPED | OUTPATIENT
Start: 2020-06-23 | End: 2020-07-21

## 2020-06-23 NOTE — PROGRESS NOTES
Carey Medina is a 34 y.o. y/o female.     Chief Complaint: Postop follow-up    HPI:   34 y.o. .  Patient's last menstrual period was 2020..  Patient overall doing well pain is well controlled, still needing Norco at night for sleep.  Ambulating without difficulty, tolerating a regular diet.  Urinating without difficulty.  Was having some trouble with bowel movements but becoming more normal now.  Denies any fevers at this time.     Review of Systems   Constitutional: Negative for chills, fatigue and fever.   HENT: Negative for sore throat.    Eyes: Negative for visual disturbance.   Respiratory: Negative for cough, shortness of breath and wheezing.    Cardiovascular: Negative for chest pain, palpitations and leg swelling.   Gastrointestinal: Negative for abdominal pain, diarrhea, nausea and vomiting.   Genitourinary: Negative for dysuria, flank pain, frequency, vaginal bleeding, vaginal discharge and vaginal pain.   Neurological: Negative for syncope, light-headedness and headaches.   Psychiatric/Behavioral: Negative for dysphoric mood and suicidal ideas. The patient is not nervous/anxious.         The following portions of the patient's history were reviewed and updated as appropriate: allergies, current medications, past family history, past medical history, past social history, past surgical history and problem list.    Allergies   Allergen Reactions   • Percocet [Oxycodone-Acetaminophen] Hives and Itching        Prior to Admission medications    Medication Sig Start Date End Date Taking? Authorizing Provider   BD ULTRA-FINE LANCETS lancets 33 gauge. Use 4 times daily 19  Yes Ashvin Joaquin APRN   glucagon (GLUCAGON EMERGENCY) 1 MG injection Inject 1 mg under the skin into the appropriate area as directed 1 (One) Time As Needed for Low Blood Sugar for up to 1 dose. 19  Yes Ashvin Joaquin APRN   glucose blood (CHICO CONTOUR NEXT TEST) test strip Use as instructed -  "test 5 times daily 11/15/19  Yes Ashvin Joaquin APRN   hydroCHLOROthiazide (HYDRODIURIL) 25 MG tablet Take 25 mg by mouth Daily. 20  Yes Krishna Teague MD   HYDROcodone-acetaminophen (Norco)  MG per tablet Take 1 tablet by mouth Every 6 (Six) Hours As Needed for Moderate Pain. 20  Yes Shaq Cortez DO   ibuprofen (ADVIL,MOTRIN) 800 MG tablet Take 1 tablet by mouth Every 6 (Six) Hours As Needed for Mild Pain **Take with food** 20  Yes Shaq Cortez DO   insulin aspart (novoLOG) 100 UNIT/ML injection 120 units Inject through insulin pump 20  Yes Ashvin Joaquin APRN   Insulin Pen Needle (PEN NEEDLES \") 31G X 8 MM misc    Yes Krishna Teague MD   Lancets 30G misc Testing 6 times daily  E11.9 11/15/19  Yes Ashvin Joaquin APRN   lisinopril (PRINIVIL,ZESTRIL) 10 MG tablet Take 10 mg by mouth Daily. 20  Yes Krishna Teague MD   medroxyPROGESTERone (PROVERA) 5 MG tablet Take 5 mg by mouth 2 (Two) Times a Day.   Yes Krishna Teague MD   HYDROcodone-acetaminophen (Norco)  MG per tablet Take 1 tablet by mouth Every 6 (Six) Hours As Needed for Moderate Pain. 20 Yes Shaq Cortez DO        The patient has a family history of   Family History   Problem Relation Age of Onset   • Diabetes Mother    • Diabetes Maternal Aunt    • Hypertension Maternal Grandmother    • Diabetes Maternal Grandmother    • Osteoporosis Other         Past Medical History:   Diagnosis Date   • Eczema    • Female infertility    • Hypertension    • Irregular periods    • Obesity    • Oligomenorrhea    • Polycystic ovaries    • Proteinuria    • Type 1 diabetes mellitus (CMS/HCC)    • Upper respiratory infection    • Visit for gynecologic examination 2015   • Vitamin D deficiency         OB History        4    Para        Term                AB   4    Living           SAB   2    TAB        Ectopic   2    " "Molar        Multiple        Live Births                     Social History     Socioeconomic History   • Marital status:      Spouse name: Not on file   • Number of children: Not on file   • Years of education: Not on file   • Highest education level: Not on file   Tobacco Use   • Smoking status: Never Smoker   • Smokeless tobacco: Never Used   Substance and Sexual Activity   • Alcohol use: No   • Drug use: No   • Sexual activity: Defer        Past Surgical History:   Procedure Laterality Date   • INCISION AND DRAINAGE ABSCESS      chin area   • LAPAROSCOPY FOR ECTOPIC PREGNANCY      x 2   • TOTAL LAPAROSCOPIC HYSTERECTOMY SALPINGO OOPHORECTOMY Bilateral 6/15/2020    Procedure: TOTAL LAPAROSCOPIC HYSTERECTOMY RIGHT SALPINGO OOPHORECTOMY, left salpingectomy;  Surgeon: Shaq Cortez DO;  Location: [facility];  Service: Obstetrics/Gynecology;  Laterality: Bilateral;        Patient Active Problem List   Diagnosis   • Diabetes mellitus type 1, uncontrolled, with complications (CMS/HCC)   • Vitamin D deficiency   • Obesity (BMI 30-39.9)   • Elevated liver enzymes   • Pelvic pain   • Abnormal uterine bleeding (AUB)   • Female dyspareunia   • S/P laparoscopic hysterectomy        Documented Vitals    06/23/20 1507   BP: 125/82   Weight: 107 kg (236 lb)   Height: 162.6 cm (64\")   PainSc: 0-No pain        Body mass index is 40.51 kg/m².    Physical Exam   Constitutional: She is oriented to person, place, and time. She appears well-developed and well-nourished. No distress.   HENT:   Head: Normocephalic.   Abdominal: Soft. She exhibits no distension. There is no tenderness. There is no rebound and no guarding.   Appropriately healing trocar site incisions no erythema or evidence of infection   Musculoskeletal: Normal range of motion.   Neurological: She is alert and oriented to person, place, and time.   Skin: Skin is warm and dry. She is not diaphoretic.   Psychiatric: She has a normal mood and affect. Her " behavior is normal. Judgment and thought content normal.   Nursing note and vitals reviewed.      Laboratory Data:   Lab Results - Last 18 Months   Lab Units 06/15/20  1014 12/25/18  0415   GLUCOSE mg/dL 210* 141*   BUN mg/dL 13 7   CREATININE mg/dL 0.76 <0.5   SODIUM mmol/L 132* 139   POTASSIUM mmol/L 4.4 3.8   CHLORIDE mmol/L 98 105   TOTAL CO2 mmol/L  --  24   CO2 mmol/L 20.0*  --    CALCIUM mg/dL 9.5 8.9   TOTAL PROTEIN g/dL  --  6.5*   ALBUMIN g/dL  --  2.8*   ALT (SGPT) U/L  --  20   AST (SGOT) U/L  --  15   ALK PHOS U/L  --  91   BILIRUBIN mg/dL  --  <0.2   EGFR IF NONAFRICN AM mL/min/1.73 87 >60   BUN / CREAT RATIO  17.1  --    ANION GAP mmol/L 14.0 10     Lab Results - Last 18 Months   Lab Units 06/16/20  0708 12/25/18  0415   WBC 10*3/mm3 15.32* 12.0*   RBC 10*6/mm3 4.32 3.87*   HEMOGLOBIN g/dL 11.8* 10.3*   HEMATOCRIT % 35.0 32.0*   MCV fL 81.0 82.7   MCH pg 27.3 26.6*   MCHC g/dL 33.7 32.2*   RDW % 13.0 15.3*   RDW-SD fl 38.0  --    MPV fL 9.9 9.1*   PLATELETS 10*3/mm3 414 509*     Lab Results - Last 18 Months   Lab Units 06/15/20  1014   HCG QUALITATIVE  Negative       Assessment   Patient status post total laparoscopic hysterectomy approximately 8 days ago, doing well and healing appropriately at this time.  No evidence of infection.  Patient meeting appropriate milestones.  Still is needing [place] at night we will refill prescription.  Patient to return to see me in 4 weeks, sooner as needed.  Blood sugars have been much better controlled since leaving the hospital.     Diagnosis Plan   1. Postoperative follow-up     2. S/P laparoscopic hysterectomy  HYDROcodone-acetaminophen (Norco)  MG per tablet         Plan         New Medications Ordered This Visit   Medications   • HYDROcodone-acetaminophen (Norco)  MG per tablet     Sig: Take 1 tablet by mouth Every 6 (Six) Hours As Needed for Moderate Pain.     Dispense:  10 tablet     Refill:  0             This document has been electronically  signed by Shaq Cortez,  on June 23, 2020 15:16

## 2020-06-23 NOTE — OUTREACH NOTE
General Surgery Week 1 Survey      Responses   [facility] patient discharged from?  [place]   Does the patient have one of the following disease processes/diagnoses(primary or secondary)?  General Surgery   Is there a successful TCM telephone encounter documented?  No   Week 1 attempt successful?  No   Unsuccessful attempts  Attempt 3          Bre Byers RN

## 2020-06-26 LAB
LAB AP CASE REPORT: NORMAL
LAB AP CLINICAL INFORMATION: NORMAL
PATH REPORT.FINAL DX SPEC: NORMAL

## 2020-07-01 ENCOUNTER — READMISSION MANAGEMENT (OUTPATIENT)
Dept: CALL CENTER | Facility: HOSPITAL | Age: 35
End: 2020-07-01

## 2020-07-01 NOTE — OUTREACH NOTE
General Surgery Week 2 Survey      Responses   [facility] patient discharged from?  [place]   Does the patient have one of the following disease processes/diagnoses(primary or secondary)?  General Surgery   Week 2 attempt successful?  No   Unsuccessful attempts  Attempt 1          Eveline Anne RN

## 2020-07-21 ENCOUNTER — OFFICE VISIT (OUTPATIENT)
Dept: OBSTETRICS AND GYNECOLOGY | Facility: CLINIC | Age: 35
End: 2020-07-21

## 2020-07-21 VITALS
DIASTOLIC BLOOD PRESSURE: 88 MMHG | WEIGHT: 236 LBS | BODY MASS INDEX: 40.29 KG/M2 | SYSTOLIC BLOOD PRESSURE: 131 MMHG | HEIGHT: 64 IN

## 2020-07-21 DIAGNOSIS — Z90.710 S/P LAPAROSCOPIC HYSTERECTOMY: Primary | ICD-10-CM

## 2020-07-21 DIAGNOSIS — Z09 POSTOPERATIVE FOLLOW-UP: ICD-10-CM

## 2020-07-21 PROCEDURE — 99024 POSTOP FOLLOW-UP VISIT: CPT | Performed by: OBSTETRICS & GYNECOLOGY

## 2020-07-21 NOTE — PROGRESS NOTES
Carey Medina is a 34 y.o. y/o female.     Chief Complaint: Postop follow-up    HPI:   34 y.o. .  Patient's last menstrual period was 2020..  Patient presents for postop follow-up visit.  Patient underwent total laparoscopic hysterectomy with left salpingo-oophorectomy and right salpingectomy approximately 5 weeks ago, is doing well pain is much better controlled now she is ambulating without difficulty tolerating a regular diet and urinating without difficulty.  Denies any vaginal bleeding denies any fevers at this time.     Review of Systems   Constitutional: Negative for chills, fatigue and fever.   HENT: Negative for sore throat.    Eyes: Negative for visual disturbance.   Respiratory: Negative for cough, shortness of breath and wheezing.    Cardiovascular: Negative for chest pain, palpitations and leg swelling.   Gastrointestinal: Negative for abdominal pain, diarrhea, nausea and vomiting.   Genitourinary: Negative for dysuria, flank pain, frequency, vaginal bleeding, vaginal discharge and vaginal pain.   Neurological: Negative for syncope, light-headedness and headaches.   Psychiatric/Behavioral: Negative for dysphoric mood and suicidal ideas. The patient is not nervous/anxious.         The following portions of the patient's history were reviewed and updated as appropriate: allergies, current medications, past family history, past medical history, past social history, past surgical history and problem list.    Allergies   Allergen Reactions   • Percocet [Oxycodone-Acetaminophen] Hives and Itching        Prior to Admission medications    Medication Sig Start Date End Date Taking? Authorizing Provider   BD ULTRA-FINE LANCETS lancets 33 gauge. Use 4 times daily 19  Yes Ashvin Joaquin APRN   glucagon (GLUCAGON EMERGENCY) 1 MG injection Inject 1 mg under the skin into the appropriate area as directed 1 (One) Time As Needed for Low Blood Sugar for up to 1 dose. 19  Yes Jemal  "HORACE Bradford   glucose blood (CHICO CONTOUR NEXT TEST) test strip Use as instructed - test 5 times daily 11/15/19  Yes Ashvin Joaquin APRN   hydroCHLOROthiazide (HYDRODIURIL) 25 MG tablet Take 25 mg by mouth Daily. 20  Yes ProviderKrishna MD   ibuprofen (ADVIL,MOTRIN) 800 MG tablet Take 1 tablet by mouth Every 6 (Six) Hours As Needed for Mild Pain **Take with food** 20  Yes Shaq Cortez DO   insulin aspart (novoLOG) 100 UNIT/ML injection 120 units Inject through insulin pump 20  Yes Ashvin Joaquin APRN   Insulin Pen Needle (PEN NEEDLES ") 31G X 8 MM misc    Yes ProviderKrishna MD   Lancets 30G misc Testing 6 times daily  E11.9 11/15/19  Yes Ashvin Joaquin APRN   lisinopril (PRINIVIL,ZESTRIL) 10 MG tablet Take 10 mg by mouth Daily. 20  Yes ProviderKrishna MD   medroxyPROGESTERone (PROVERA) 5 MG tablet Take 5 mg by mouth 2 (Two) Times a Day.   Yes ProviderKrishna MD   sertraline (Zoloft) 50 MG tablet Take 1 tablet by mouth Daily. 20 Yes Shaq Cortez DO   HYDROcodone-acetaminophen (Norco)  MG per tablet Take 1 tablet by mouth Every 6 (Six) Hours As Needed for Moderate Pain. 20  Shaq Cortez DO        The patient has a family history of   Family History   Problem Relation Age of Onset   • Diabetes Mother    • Diabetes Maternal Aunt    • Hypertension Maternal Grandmother    • Diabetes Maternal Grandmother    • Osteoporosis Other         Past Medical History:   Diagnosis Date   • Eczema    • Female infertility    • Hypertension    • Irregular periods    • Obesity    • Oligomenorrhea    • Polycystic ovaries    • Proteinuria    • Type 1 diabetes mellitus (CMS/HCC)    • Upper respiratory infection    • Visit for gynecologic examination 2015   • Vitamin D deficiency         OB History        4    Para        Term                AB   4    Living           SAB   2 " "   TAB        Ectopic   2    Molar        Multiple        Live Births                     Social History     Socioeconomic History   • Marital status:      Spouse name: Not on file   • Number of children: Not on file   • Years of education: Not on file   • Highest education level: Not on file   Tobacco Use   • Smoking status: Never Smoker   • Smokeless tobacco: Never Used   Substance and Sexual Activity   • Alcohol use: No   • Drug use: No   • Sexual activity: Defer        Past Surgical History:   Procedure Laterality Date   • INCISION AND DRAINAGE ABSCESS      chin area   • LAPAROSCOPY FOR ECTOPIC PREGNANCY      x 2   • TOTAL LAPAROSCOPIC HYSTERECTOMY SALPINGO OOPHORECTOMY Bilateral 6/15/2020    Procedure: TOTAL LAPAROSCOPIC HYSTERECTOMY RIGHT SALPINGO OOPHORECTOMY, left salpingectomy;  Surgeon: Shaq Cortez DO;  Location: [facility];  Service: Obstetrics/Gynecology;  Laterality: Bilateral;        Patient Active Problem List   Diagnosis   • Diabetes mellitus type 1, uncontrolled, with complications (CMS/HCC)   • Vitamin D deficiency   • Obesity (BMI 30-39.9)   • Elevated liver enzymes   • Pelvic pain   • Abnormal uterine bleeding (AUB)   • Female dyspareunia   • S/P laparoscopic hysterectomy        Documented Vitals    07/21/20 1053   BP: 131/88   Weight: 107 kg (236 lb)   Height: 162.6 cm (64\")   PainSc: 0-No pain        Body mass index is 40.51 kg/m².    Physical Exam   Constitutional: She is oriented to person, place, and time. She appears well-developed and well-nourished. No distress.   HENT:   Head: Normocephalic.   Abdominal: Soft. She exhibits no distension and no mass. There is no tenderness. There is no rebound.   Appropriately healing trocar site incisions.  No erythema or evidence of infection.   Musculoskeletal: Normal range of motion.   Neurological: She is alert and oriented to person, place, and time.   Skin: Skin is warm and dry. She is not diaphoretic.   Psychiatric: She has a " normal mood and affect. Her behavior is normal. Judgment and thought content normal.       Laboratory Data:   Lab Results - Last 18 Months   Lab Units 06/15/20  1014   GLUCOSE mg/dL 210*   BUN mg/dL 13   CREATININE mg/dL 0.76   SODIUM mmol/L 132*   POTASSIUM mmol/L 4.4   CHLORIDE mmol/L 98   CO2 mmol/L 20.0*   CALCIUM mg/dL 9.5   EGFR IF NONAFRICN AM mL/min/1.73 87   BUN / CREAT RATIO  17.1   ANION GAP mmol/L 14.0     Lab Results - Last 18 Months   Lab Units 06/16/20  0708   WBC 10*3/mm3 15.32*   RBC 10*6/mm3 4.32   HEMOGLOBIN g/dL 11.8*   HEMATOCRIT % 35.0   MCV fL 81.0   MCH pg 27.3   MCHC g/dL 33.7   RDW % 13.0   RDW-SD fl 38.0   MPV fL 9.9   PLATELETS 10*3/mm3 414     Lab Results - Last 18 Months   Lab Units 06/15/20  1014   HCG QUALITATIVE  Negative       Assessment   Doing well and healing appropriately at this time.  Patient to return to see me as needed.  Return in 1 year for well woman exam.  Recommended abstinence for 3 additional weeks until 8 weeks postop.  May return to other activities as tolerated at this time.     Diagnosis Plan   1. S/P laparoscopic hysterectomy     2. Postoperative follow-up             This document has been electronically signed by Shaq Cortez DO on July 21, 2020 11:21

## 2021-01-29 ENCOUNTER — DOCUMENTATION (OUTPATIENT)
Dept: ENDOCRINOLOGY | Facility: CLINIC | Age: 36
End: 2021-01-29

## 2021-03-04 ENCOUNTER — TELEMEDICINE (OUTPATIENT)
Dept: ENDOCRINOLOGY | Facility: CLINIC | Age: 36
End: 2021-03-04

## 2021-03-04 DIAGNOSIS — E10.65 TYPE 1 DIABETES MELLITUS WITH HYPERGLYCEMIA (HCC): Primary | ICD-10-CM

## 2021-03-04 DIAGNOSIS — E55.9 VITAMIN D DEFICIENCY: ICD-10-CM

## 2021-03-04 DIAGNOSIS — R74.8 ELEVATED LIVER ENZYMES: ICD-10-CM

## 2021-03-04 PROCEDURE — 99214 OFFICE O/P EST MOD 30 MIN: CPT | Performed by: NURSE PRACTITIONER

## 2021-03-04 NOTE — PROGRESS NOTES
Chief Complaint  Diabetes    Subjective          Carey Medina presents to [facility] ENDOCRINOLOGY  History of Present Illness      You have chosen to receive care through a telehealth visit.  Do you consent to use a video/audio connection for your medical care today? Yes        TELEHEALTH VIDEO VISIT     This a video visit due to [facility] current guidelines for social distancing due to the COVID 19 pandemic      35 year old female presents for follow up     Reason diabetes mellitus type 1      Duration diagnosed at age 21     Timing constant       Quality -improved control     Severity - moderate      alleviating factors - intelligence       Aggravating factors - none                 Severity (Complications/Hospitalizations)  Secondary Macrovascular Complications:  No CAD, No CVA, No PAD  Secondary Microvascular Complications:  No Diabetic Nephropathy, No Diabetic Retinopathy, No Diabetic Neuropathy        Context  Diabetes Regimen:Insulin, Not on Oral Medications, Compliant with regimen, last hgbA1c 7.1 % Oct. 2016            Blood Glucose Readings               Lab Results   Component Value Date     HGBA1C 8.3 11/15/2019             Medtronic  insulin pump     States running at goal               Exercise:  Exercises        Associated Signs/Symptoms  Hyperglycemic Symptoms:   None  Hypoglycemic Episodes:No documented hypoglycemia                        Review of Systems   Constitutional: Positive for fatigue.   All other systems reviewed and are negative.    Objective   Vital Signs:   There were no vitals taken for this visit.    Physical Exam  Constitutional:       Appearance: Normal appearance.   Neck:      Musculoskeletal: Normal range of motion.   Musculoskeletal: Normal range of motion.   Skin:     Coloration: Skin is not pale.   Neurological:      General: No focal deficit present.      Mental Status: She is alert and oriented to person, place, and time.   Psychiatric:         Mood and  Affect: Mood normal.         Thought Content: Thought content normal.         Judgment: Judgment normal.        Result Review :   The following data was reviewed by: HORACE Anglin on 03/04/2021:  Common labs    Common Labsle 6/15/20 6/16/20   Glucose 210 (A)    BUN 13    Creatinine 0.76    eGFR Non African Am 87    Sodium 132 (A)    Potassium 4.4    Chloride 98    Calcium 9.5    WBC  15.32 (A)   Hemoglobin  11.8 (A)   Hematocrit  35.0   Platelets  414   (A) Abnormal value                      Assessment and Plan    Diagnoses and all orders for this visit:    1. Type 1 diabetes mellitus with hyperglycemia (CMS/[facility]) (Primary)  -     CBC & Differential; Future  -     Comprehensive Metabolic Panel; Future  -     Hemoglobin A1c; Future  -     Lipid Panel; Future  -     Microalbumin / Creatinine Urine Ratio - Urine, Clean Catch; Future  -     Protein / Creatinine Ratio, Urine - Urine, Clean Catch; Future  -     TSH; Future  -     Vitamin B12; Future  -     Vitamin D 25 Hydroxy; Future    2. Vitamin D deficiency  -     CBC & Differential; Future  -     Comprehensive Metabolic Panel; Future  -     Hemoglobin A1c; Future  -     Lipid Panel; Future  -     Microalbumin / Creatinine Urine Ratio - Urine, Clean Catch; Future  -     Protein / Creatinine Ratio, Urine - Urine, Clean Catch; Future  -     TSH; Future  -     Vitamin B12; Future  -     Vitamin D 25 Hydroxy; Future    3. Elevated liver enzymes           Glycemic Management                     Lab Results   Component Value Date     HGBA1C 8.3 11/15/2019                   medtronic insulin pump        BASAL  -        MN - 1.85      Carb ratio       6      Sensitivity      25          Changes she states settings are fine where they are at this time         Lipid Management       Not on statin       Nov. 2014    LDL - 110         Blood Pressure Management:      Not on ACEi     Microvascular Complication Monitoring       Last eye exam overdue postponed due to  pandemic      no neuropathy      Immunizations: last influenza immunization 11-19 , last pneumococcal immunization 11-13  States will not take the flu shot again       Weight Related:      Obesity                     She will be having  a hysterectomy in June 2020     Bone Health        March 2016       Vit d - 14       States cannot take vitamin makes sick      try a multivitamin with vitamin d - takes daily      Other Diabetes Related Aspects      Nov. 2019      TSH - nl      Elevated liver enzymes               ALT- 95     AST - 58          He did have labs this week on his own have them yet I will call soon as I get the results      Follow Up   Return in about 6 months (around 9/4/2021) for Recheck.  Patient was given instructions and counseling regarding her condition or for health maintenance advice. Please see specific information pulled into the AVS if appropriate.     We spent 25 minutes including reviewing the patients electronic chart, reviewing medications, reviewing labs, active problems    I provided advice regarding diabetes management, dietary changes, adjustments of medication, self titration of insulin, refilled medications, ordering labs, future appointments    Patient was advised to contact the office if there are unanswered questions, trouble with blood glucose management, or any concerns

## 2021-05-19 ENCOUNTER — TRANSCRIBE ORDERS (OUTPATIENT)
Dept: ADMINISTRATIVE | Facility: HOSPITAL | Age: 36
End: 2021-05-19

## 2021-05-19 DIAGNOSIS — M25.511 RIGHT SHOULDER PAIN, UNSPECIFIED CHRONICITY: Primary | ICD-10-CM

## 2021-06-04 ENCOUNTER — HOSPITAL ENCOUNTER (OUTPATIENT)
Dept: MRI IMAGING | Facility: HOSPITAL | Age: 36
Discharge: HOME | End: 2021-06-04
Admitting: NURSE PRACTITIONER

## 2021-06-04 DIAGNOSIS — M25.511 RIGHT SHOULDER PAIN, UNSPECIFIED CHRONICITY: ICD-10-CM

## 2021-06-04 PROCEDURE — 73221 MRI JOINT UPR EXTREM W/O DYE: CPT

## 2022-05-02 ENCOUNTER — TELEPHONE (OUTPATIENT)
Dept: ENDOCRINOLOGY | Facility: CLINIC | Age: 37
End: 2022-05-02

## 2022-05-13 ENCOUNTER — DOCUMENTATION (OUTPATIENT)
Dept: ENDOCRINOLOGY | Facility: CLINIC | Age: 37
End: 2022-05-13

## 2022-05-13 NOTE — PROGRESS NOTES
CMN FOR [employer] SENT TO [facility]     CONFIRMATION RECEIVED     SENT TO MR

## 2022-06-05 NOTE — PROGRESS NOTES
Carey Medina is a 34 y.o. y/o female.     Chief Complaint: Endometrial biopsy for chronic amenorrhea    HPI:   34 y.o. .  No LMP recorded. Patient is not currently having periods (Reason: Other)..  Patient presents today for endometrial biopsy.  Secondary to polycystic ovarian syndrome and history of longstanding amenorrhea plan to do endometrial biopsy to rule out any endometrial hyperplasia.  Patient states that she has had longstanding chronic pelvic pain that she has tried birth control pills for in the past and have not gotten better despite hormonal treatment options.  Patient states that she is tired of having the pain and desires definitive management via hysterectomy at this time.  She states that a lot of her pain is midline and on the right.  Recommended that she undergo total laparoscopic hysterectomy with right salpingo-oophorectomy and left salpingectomy.  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  We will see what endometrial biopsy shows today and likely schedule surgery in .     Review of Systems   Constitutional: Negative for chills, fatigue and fever.   HENT: Negative for sore throat.    Eyes: Negative for visual disturbance.   Respiratory: Negative for cough, shortness of breath and wheezing.    Cardiovascular: Negative for chest pain, palpitations and leg swelling.   Gastrointestinal: Negative for abdominal pain, diarrhea, nausea and vomiting.   Genitourinary: Positive for menstrual problem and pelvic pain. Negative for dysuria, flank pain, frequency, vaginal bleeding, vaginal discharge and vaginal pain.   Neurological: Negative for syncope, light-headedness and headaches.   Psychiatric/Behavioral: Negative for dysphoric mood and suicidal ideas. The patient is not nervous/anxious.         The following portions of the patient's history were reviewed and updated as appropriate: allergies, current medications,  "past family history, past medical history, past social history, past surgical history and problem list.    Allergies   Allergen Reactions   • Percocet [Oxycodone-Acetaminophen] Hives and Itching        Prior to Admission medications    Medication Sig Start Date End Date Taking? Authorizing Provider   BD ULTRA-FINE LANCETS lancets 33 gauge. Use 4 times daily 2/22/19  Yes Ashvin Joaquin APRN   glucagon (GLUCAGON EMERGENCY) 1 MG injection Inject 1 mg into the shoulder, thigh, or buttocks 1 (One) Time As Needed for low blood sugar (use for low blood sugar) for up to 1 dose. 11/14/16  Yes Ashvin Joaquin APRN   glucagon (GLUCAGON EMERGENCY) 1 MG injection Inject 1 mg under the skin into the appropriate area as directed 1 (One) Time As Needed for Low Blood Sugar for up to 1 dose. 2/22/19  Yes Ashvin Joaquin APRN   Insulin Pen Needle (PEN NEEDLES 5/16\") 31G X 8 MM misc    Yes Provider, MD Krishna   glucose blood (CHICO CONTOUR NEXT TEST) test strip Use as instructed - test 5 times daily 2/22/19 11/15/19 Yes Ashvin Joaquin APRN   insulin aspart (novoLOG) 100 UNIT/ML injection 120 units Inject through insulin pump 2/22/19 11/15/19 Yes Ashvin Joaquin APRN   glucose blood (CHICO CONTOUR NEXT TEST) test strip Use as instructed - test 5 times daily 11/15/19   Ashvin Joaquin APRN   insulin aspart (novoLOG) 100 UNIT/ML injection 120 units Inject through insulin pump 11/15/19   Ashvin Joaquin APRN   Lancets 30G misc Testing 6 times daily  E11.9 11/15/19   Ashvin Joaquin APRN   medroxyPROGESTERone (PROVERA) 10 MG tablet Take 1 tablet by mouth Daily. 7/26/19 11/15/19  Em Wong APRN        The patient has a family history of   Family History   Problem Relation Age of Onset   • Diabetes Mother    • Diabetes Maternal Aunt    • Hypertension Maternal Grandmother    • Diabetes Maternal Grandmother    • Osteoporosis Other         Past Medical " "History:   Diagnosis Date   • Female infertility    • Irregular periods    • Obesity    • Oligomenorrhea    • Polycystic ovaries    • Proteinuria    • Type 1 diabetes mellitus (CMS/HCC)    • Upper respiratory infection    • Visit for gynecologic examination 2015   • Vitamin D deficiency         OB History      Para Term  AB Living    4       4      SAB TAB Ectopic Molar Multiple Live Births    2   2                 Social History     Socioeconomic History   • Marital status:      Spouse name: Not on file   • Number of children: Not on file   • Years of education: Not on file   • Highest education level: Not on file   Tobacco Use   • Smoking status: Never Smoker   • Smokeless tobacco: Never Used   Substance and Sexual Activity   • Alcohol use: No   • Drug use: No   • Sexual activity: Yes     Partners: Male     Comment:  to Rajesh 8 years        Past Surgical History:   Procedure Laterality Date   • LAPAROSCOPY FOR ECTOPIC PREGNANCY          Patient Active Problem List   Diagnosis   • Diabetes mellitus type 1, uncontrolled, with complications (CMS/HCC)   • Vitamin D deficiency   • Obesity (BMI 30-39.9)   • Elevated liver enzymes        Documented Vitals    11/15/19 1005   BP: 142/94   Weight: 108 kg (237 lb)   Height: 162.6 cm (64\")        Body mass index is 40.68 kg/m².    Physical Exam   Constitutional: She is oriented to person, place, and time. She appears well-developed and well-nourished. No distress.   HENT:   Head: Normocephalic.   Genitourinary: Vagina normal. No vaginal discharge found.   Genitourinary Comments: Millry biopsy explained in detail to patient.  Cervix was visualized without difficulty and was normal in appearance.  Anterior lip of the cervix was grasped with single-tooth tenaculum.  Endometrial biopsy C Pipelle was tried to pass after cervix was cleaned with iodine.  Pipelle was passed to the level of 5 cm uncertain if it was able to be passed past the internal " cervical office.  Minimal amount of tissue obtained.   Musculoskeletal: Normal range of motion.   Neurological: She is alert and oriented to person, place, and time.   Skin: Skin is warm and dry. She is not diaphoretic.   Psychiatric: She has a normal mood and affect. Her behavior is normal. Judgment and thought content normal.   Vitals reviewed.      Laboratory Data:   Lab Results - Last 18 Months   Lab Units 12/21/18  0123 12/20/18  2103 12/20/18  0032   GLUCOSE mg/dL 180* 172* 224*   BUN mg/dL 5* 6 7   CREATININE mg/dL 0.5 0.5 0.5   SODIUM mmol/L 138 137 132*   POTASSIUM mmol/L 3.4* 3.5 3.7   CHLORIDE mmol/L 104 101 98   TOTAL CO2 mmol/L 19* 16* 19*   CALCIUM mg/dL 8.5* 8.5* 9.3   TOTAL PROTEIN g/dL 6.7  --  8.3   ALBUMIN g/dL 2.6*  --  3.6   ALT (SGPT) U/L 21  --  33   AST (SGOT) U/L 19  --  27   ALK PHOS U/L 123*  --  148*   BILIRUBIN mg/dL 0.4  --  0.4   EGFR IF NONAFRICN AM  >60 >60 >60   ANION GAP mmol/L 15 20* 15     Lab Results - Last 18 Months   Lab Units 12/25/18  0415 12/24/18  0138 12/22/18  0223 12/20/18  0032   WBC K/uL 12.0* 7.2 13.7* 19.1*   RBC M/uL 3.87* 3.80* 3.80* 4.97   HEMOGLOBIN g/dL 10.3* 10.2* 10.3* 13.2   HEMATOCRIT % 32.0* 31.7* 32.2* 41.1   MCV fL 82.7 83.4 84.7 82.7   MCH pg 26.6* 26.8* 27.1 26.6*   MCHC g/dL 32.2* 32.2* 32.0* 32.1*   RDW % 15.3* 15.5* 15.9* 15.2*   MPV fL 9.1* 9.4 9.5 9.6   PLATELETS K/uL 509* 401* 359 371     Lab Results - Last 18 Months   Lab Units 12/20/18  0032   HCG QUALITATIVE  Negative       Assessment   Patient with amenorrhea secondary to polycystic ovarian syndrome starting for possible endometrial hyperplasia.  Patient also with chronic pelvic pain that has not been relieved with oral contraceptive pills in the past now desiring definitive management of her pelvic pain via hysterectomy.  Uncomplicated endometrial biopsy today.  Concern for possible inadequate biopsy, if no endometrial tissue present will consider having patient undergo dilation and  curettage to evaluate endometrial cavity prior to hysterectomy.  Plan to have patient return to see me in May to schedule hysterectomy for June or July.  Patient to return sooner as needed.     Diagnosis Plan   1. Amenorrhea  Tissue Pathology Exam   2. Pelvic pain     3. Female dyspareunia     4. Obesity (BMI 30-39.9)             This document has been electronically signed by Shaq Cortez DO on November 15, 2019 8:37 PM   warm

## 2022-08-10 ENCOUNTER — TELEMEDICINE (OUTPATIENT)
Dept: ENDOCRINOLOGY | Facility: CLINIC | Age: 37
End: 2022-08-10

## 2022-08-10 DIAGNOSIS — R74.8 ELEVATED LIVER ENZYMES: ICD-10-CM

## 2022-08-10 DIAGNOSIS — E55.9 VITAMIN D DEFICIENCY: ICD-10-CM

## 2022-08-10 DIAGNOSIS — E10.65 TYPE 1 DIABETES MELLITUS WITH HYPERGLYCEMIA: Primary | ICD-10-CM

## 2022-08-10 PROCEDURE — 99214 OFFICE O/P EST MOD 30 MIN: CPT | Performed by: NURSE PRACTITIONER

## 2022-08-10 RX ORDER — PROCHLORPERAZINE 25 MG/1
1 SUPPOSITORY RECTAL AS NEEDED
Qty: 27 EACH | Refills: 3 | Status: SHIPPED | OUTPATIENT
Start: 2022-08-10

## 2022-08-10 RX ORDER — INSULIN ASPART 100 [IU]/ML
INJECTION, SOLUTION INTRAVENOUS; SUBCUTANEOUS
Qty: 120 ML | Refills: 3 | Status: SHIPPED | OUTPATIENT
Start: 2022-08-10

## 2022-08-10 RX ORDER — PROCHLORPERAZINE 25 MG/1
1 SUPPOSITORY RECTAL
Qty: 1 EACH | Refills: 3 | Status: SHIPPED | OUTPATIENT
Start: 2022-08-10

## 2022-08-10 RX ORDER — PROCHLORPERAZINE 25 MG/1
1 SUPPOSITORY RECTAL ONCE
Qty: 3 EACH | Refills: 1 | Status: SHIPPED | OUTPATIENT
Start: 2022-08-10 | End: 2022-08-10

## 2022-08-10 NOTE — PROGRESS NOTES
Chief Complaint  Diabetes    Subjective          Carey Medina presents to [facility] ENDOCRINOLOGY  History of Present Illness    You have chosen to receive care through a telehealth visit.  Do you consent to use a video/audio connection for your medical care today? Yes          TELEHEALTH VIDEO VISIT     This a video visit due to [facility] current guidelines for social distancing due to the COVID 19 pandemic    36 year old female presents for follow up     Reason diabetes mellitus type 1      Duration diagnosed at age 21     Timing constant       Quality -improved control     Severity - moderate      alleviating factors - intelligence       Aggravating factors - none                 Microvascular Complications:  No Diabetic Nephropathy, No Diabetic Retinopathy, No Diabetic Neuropathy                Diabetes Regimen:Insulin, Not on Oral Medications, Compliant with regimen, last hgbA1c 7.1 % Oct. 2016            Blood Glucose Readings             Medtronic  insulin pump     States running at goal mostly       checks 4 times daily            Review of Systems - General ROS: negative          Objective   Vital Signs:   There were no vitals taken for this visit.    Physical Exam  Musculoskeletal:         General: Normal range of motion.   Neurological:      General: No focal deficit present.      Mental Status: She is alert.   Psychiatric:         Mood and Affect: Mood normal.         Thought Content: Thought content normal.         Judgment: Judgment normal.        Result Review :   The following data was reviewed by: HORACE Anglin on 03/04/2021:                Assessment and Plan     Diagnoses and all orders for this visit:    1. Type 1 diabetes mellitus with hyperglycemia (HCC) (Primary)  -     CBC & Differential; Future  -     Comprehensive Metabolic Panel; Future  -     Hemoglobin A1c; Future  -     Lipid Panel; Future  -     Microalbumin / Creatinine Urine Ratio - Urine,  Clean Catch; Future  -     TSH; Future  -     Vitamin B12; Future  -     Vitamin D 25 Hydroxy; Future    2. Vitamin D deficiency  -     CBC & Differential; Future  -     Comprehensive Metabolic Panel; Future  -     Hemoglobin A1c; Future  -     Lipid Panel; Future  -     Microalbumin / Creatinine Urine Ratio - Urine, Clean Catch; Future  -     TSH; Future  -     Vitamin B12; Future  -     Vitamin D 25 Hydroxy; Future    3. Elevated liver enzymes    Other orders  -     Insulin Aspart (novoLOG) 100 UNIT/ML injection; 120 units daily through pump , E10.65  Dispense: 120 mL; Refill: 3  -     Continuous Blood Gluc Transmit (Dexcom G6 Transmitter) misc; 1 each Every 3 (Three) Months.  Dispense: 1 each; Refill: 3  -     Continuous Blood Gluc Sensor (Dexcom G6 Sensor); 1 each As Needed (glucose control). Every 10 days  Dispense: 27 each; Refill: 3  -     Continuous Blood Gluc  (Dexcom G6 ) device; 1 each 1 (One) Time for 1 dose.  Dispense: 3 each; Refill: 1           Glycemic Management        Diabetes mellitus type 1          medtronic insulin pump        BASAL  -        MN - 1.85      Carb ratio          6      Sensitivity      25     No changes today     Patient agrees to wear a CGM        Approve dexcom G6 and tandem       The patient has diabetes mellitus, insulin-dependent.     Our Diabetes Department has evaluated the patient in the last six months and will continue counseling on insulin adjustment.      The patient performs blood glucose testing at least four times daily with proven glucose variability from 50 to 300 mg per dl.     The patient is administering basal insulin and prandial insulin four times per day for more than six months.     The patient uses a home blood glucose monitor to assess blood glucose at least four times daily for more than six months.     The patient requires frequent adjustment of insulin treatment regimen based on blood glucose readings.     The patient has frequent  variability in blood glucose readings due to activity and variability in meal content and time.      The patient has completed a diabetes education program with us.     The patient has demonstrated the ability to self-monitor her glucose.      The patient is motivated in improving diabetes control      The patient has hypoglycemia unawareness              This document has been electronically signed by HORACE Anglin on August 17, 2022 09:04 CDT           Lipid Management       Not on statin       Nov. 2014    LDL - 110         Blood Pressure Management:      Not on ACEi     Microvascular Complication Monitoring       Last eye exam overdue postponed due to pandemic      no neuropathy                 Bone Health         Vitamin d def       States cannot take vitamin makes sick      try a multivitamin with vitamin d - takes daily      Other Diabetes Related Aspects      Nov. 2019      TSH - nl      Elevated liver enzymes               ALT- 95     AST - 58              Follow Up   No follow-ups on file.

## 2022-08-15 ENCOUNTER — DOCUMENTATION (OUTPATIENT)
Dept: ENDOCRINOLOGY | Facility: CLINIC | Age: 37
End: 2022-08-15

## 2022-08-15 NOTE — PROGRESS NOTES
PAPER PA FOR DEXCOM FAXED TO [facility] @ [phone] WITH CHART NOTES.    CONFIRMATION RECEIVED  SENT TO MED REC

## 2022-08-17 ENCOUNTER — DOCUMENTATION (OUTPATIENT)
Dept: ENDOCRINOLOGY | Facility: CLINIC | Age: 37
End: 2022-08-17

## 2022-08-17 NOTE — PROGRESS NOTES
CHART NOTES FAXED TO [facility] FOR PA APPEAL REQUEST, STATING PT AGREES TO WEAR CGM.  FAXED -[phone] ON 8/17/22

## 2022-08-22 ENCOUNTER — DOCUMENTATION (OUTPATIENT)
Dept: ENDOCRINOLOGY | Facility: CLINIC | Age: 37
End: 2022-08-22

## 2022-08-26 ENCOUNTER — DOCUMENTATION (OUTPATIENT)
Dept: ENDOCRINOLOGY | Facility: CLINIC | Age: 37
End: 2022-08-26

## 2022-08-26 NOTE — PROGRESS NOTES
SMN AND PWO FOR PUMP AND PUMP SUPPLIES FAXED TO [facility] WITH CHART NOTES @ [phone]    CONFIRMATION RECEIVED  SENT TO MED REC

## 2023-01-13 ENCOUNTER — HOSPITAL ENCOUNTER (OUTPATIENT)
Dept: MRI IMAGING | Age: 38
Discharge: HOME | End: 2023-01-13
Payer: OTHER GOVERNMENT

## 2023-01-13 DIAGNOSIS — M75.111 NONTRAUMATIC INCOMPLETE TEAR OF RIGHT ROTATOR CUFF: ICD-10-CM

## 2023-01-13 PROCEDURE — 73221 MRI JOINT UPR EXTREM W/O DYE: CPT

## 2023-02-01 ENCOUNTER — DOCUMENTATION (OUTPATIENT)
Dept: ENDOCRINOLOGY | Facility: CLINIC | Age: 38
End: 2023-02-01
Payer: OTHER GOVERNMENT

## 2023-02-02 ENCOUNTER — DOCUMENTATION (OUTPATIENT)
Dept: ENDOCRINOLOGY | Facility: CLINIC | Age: 38
End: 2023-02-02
Payer: OTHER GOVERNMENT

## 2023-03-02 ENCOUNTER — DOCUMENTATION (OUTPATIENT)
Dept: ENDOCRINOLOGY | Facility: CLINIC | Age: 38
End: 2023-03-02
Payer: OTHER GOVERNMENT

## 2023-04-11 NOTE — DISCHARGE INSTRUCTIONS
drainage or redness     o Red streaking coming away from the incision  o Increased pain  o Increased temperature above 101.5 degrees      Physical Therapy:        *  Your physical therapy status will be discussed with you postoperatively and at your first post-op appointment. Some injuries will not require physical therapy. *  If you have a shoulder manipulation, please schedule therapy for the next day      Medications: You will be discharged with the appropriate medications following your surgery. Fill these at the pharmacy and take them as directed on the label. Not all of the medications below may be prescribed. Occasionally, other medications may be prescribed with specific instructions. Percocet/Norco (oxycodone/hydrocodone with tylenol) - Pain Medication, will cause drowsiness, possibly itchiness (this is NOT an allergy - use benadryl or an over the counter allergy medication such as Claritin or Zyrtec)     o Take 1-2 tablets every 4-6 hours. DO NOT EXCEED 4,000mg of Tylenol in 24 hours. **DO NOT MIX WITH ALCOHOL, DRIVE WHILE TAKING, OR TAKE with extra TYLENOL**     *  After the 2nd day of surgery, begin weaning pain medication (less pills, increased timeframe)     *  ALL narcotics will cause constipation - take a stool softener frequently. If you become constipated consider taking Milk of Magnesia as directed on the bottle. Colace (Docusate) - stool softener, used for constipation    Zofran (Ondansetron) or Phenergan - Anti-nausea medication, will cause drowsiness      **If you are running low on pain medications, please notify us if you need a refill 24-48 hours prior to when you run out, so we can make arrangements to refill the prescription for you if we determine it is necessary.

## 2023-04-12 PROBLEM — M75.111 INCOMPLETE ROTATOR CUFF TEAR OR RUPTURE OF RIGHT SHOULDER, NOT SPECIFIED AS TRAUMATIC: Status: ACTIVE | Noted: 2023-04-12

## 2023-04-12 NOTE — OP NOTE
Patient Name: Nereyda Mcgowan  : 1985  MRN: [id number]    [address] of SURGERY: 2023    SURGEON: Yobany Mckenna MD    ASSISTANT: NONE    PREOPERATIVE DIAGNOSIS:  1) Chronic traumatic incomplete rotator cuff tear of the Right shoulder  2) Subacromial impingement syndrome, Right Shoulder       POSTOPERATIVE DIAGNOSIS:  1) Chronic traumatic incomplete rotator cuff tear of the Right shoulder  2) Subacromial impingement syndrome, Right Shoulder     PROCEDURE PERFORMED:  1) Right Shoulder arthroscopic rotator cuff debridement  2) Right Shoulder arthroscopic subacromial decompression      IMPLANTS: none    ANESTHESIA USED: General endotrachial anesthesia, interscalene block    ESTIMATED BLOOD LOSS: minimal    DRAINS: none     COMPLICATIONS: none    SPECIMENS: none    OPERATIVE INDICATIONS: 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 She has been treated conservatively for years. An MRI was obtained which showed the above named diagnoses. Pain was not relieved with conservative treatment consisting of anti-inflammatories, corticosteroid injections, physical therapy. Due to progressive pain and loss of function, surgical evaluation was discussed and the patient wished to proceed understanding risks, benefits, and alternatives. The surgical indication were to relieve pain, improve function, and prevent future disability in regards to the shoulder pathology dictated in the aboved diagnoses. Risks included, but were not limited to, that of anesthesia, bleeding, infection, pain, damage to local structures, need for further surgery, failure of repair, stiffness, failure of implants, and loss of function.         OPERATIVE FINDINGS:  1) Exam under anesthesia:  Full passive ROM, joint stable without laxity, skin intact  2)

## 2023-04-13 ENCOUNTER — HOSPITAL ENCOUNTER (OUTPATIENT)
Age: 38
Setting detail: OUTPATIENT SURGERY
Discharge: HOME | End: 2023-04-13
Attending: ORTHOPAEDIC SURGERY | Admitting: ORTHOPAEDIC SURGERY
Payer: OTHER GOVERNMENT

## 2023-04-13 VITALS
SYSTOLIC BLOOD PRESSURE: 110 MMHG | WEIGHT: 210 LBS | HEART RATE: 101 BPM | OXYGEN SATURATION: 94 % | TEMPERATURE: 98.2 F | DIASTOLIC BLOOD PRESSURE: 77 MMHG | BODY MASS INDEX: 35.85 KG/M2 | RESPIRATION RATE: 16 BRPM | HEIGHT: 64 IN

## 2023-04-13 DIAGNOSIS — M75.111 INCOMPLETE ROTATOR CUFF TEAR OR RUPTURE OF RIGHT SHOULDER, NOT SPECIFIED AS TRAUMATIC: Primary | ICD-10-CM

## 2023-04-13 LAB
GLUCOSE BLD-MCNC: 247 MG/DL (ref 70–99)
PERFORMED ON: ABNORMAL

## 2023-04-13 PROCEDURE — 7100000011 HC PHASE II RECOVERY - ADDTL 15 MIN: Performed by: ORTHOPAEDIC SURGERY

## 2023-04-13 PROCEDURE — 2709999900 HC NON-CHARGEABLE SUPPLY: Performed by: ORTHOPAEDIC SURGERY

## 2023-04-13 PROCEDURE — 7100000000 HC PACU RECOVERY - FIRST 15 MIN: Performed by: ORTHOPAEDIC SURGERY

## 2023-04-13 PROCEDURE — 2720000010 HC SURG SUPPLY STERILE: Performed by: ORTHOPAEDIC SURGERY

## 2023-04-13 PROCEDURE — 2580000003 HC RX 258: Performed by: ORTHOPAEDIC SURGERY

## 2023-04-13 PROCEDURE — 6370000000 HC RX 637 (ALT 250 FOR IP): Performed by: ORTHOPAEDIC SURGERY

## 2023-04-13 PROCEDURE — C1713 ANCHOR/SCREW BN/BN,TIS/BN: HCPCS | Performed by: ORTHOPAEDIC SURGERY

## 2023-04-13 PROCEDURE — 6370000000 HC RX 637 (ALT 250 FOR IP): Performed by: ANESTHESIOLOGY

## 2023-04-13 PROCEDURE — 3600000014 HC SURGERY LEVEL 4 ADDTL 15MIN: Performed by: ORTHOPAEDIC SURGERY

## 2023-04-13 PROCEDURE — 6360000002 HC RX W HCPCS: Performed by: ANESTHESIOLOGY

## 2023-04-13 PROCEDURE — 3600000004 HC SURGERY LEVEL 4 BASE: Performed by: ORTHOPAEDIC SURGERY

## 2023-04-13 PROCEDURE — 7100000010 HC PHASE II RECOVERY - FIRST 15 MIN: Performed by: ORTHOPAEDIC SURGERY

## 2023-04-13 PROCEDURE — 6360000002 HC RX W HCPCS: Performed by: ORTHOPAEDIC SURGERY

## 2023-04-13 PROCEDURE — A4217 STERILE WATER/SALINE, 500 ML: HCPCS | Performed by: ORTHOPAEDIC SURGERY

## 2023-04-13 PROCEDURE — 3700000000 HC ANESTHESIA ATTENDED CARE: Performed by: ORTHOPAEDIC SURGERY

## 2023-04-13 PROCEDURE — 7100000001 HC PACU RECOVERY - ADDTL 15 MIN: Performed by: ORTHOPAEDIC SURGERY

## 2023-04-13 PROCEDURE — 82962 GLUCOSE BLOOD TEST: CPT

## 2023-04-13 PROCEDURE — 2500000003 HC RX 250 WO HCPCS: Performed by: ORTHOPAEDIC SURGERY

## 2023-04-13 PROCEDURE — 3700000001 HC ADD 15 MINUTES (ANESTHESIA): Performed by: ORTHOPAEDIC SURGERY

## 2023-04-13 RX ORDER — MORPHINE SULFATE 4 MG/ML
4 INJECTION, SOLUTION INTRAMUSCULAR; INTRAVENOUS EVERY 5 MIN PRN
Status: DISCONTINUED | OUTPATIENT
Start: 2023-04-13 | End: 2023-04-13 | Stop reason: HOSPADM

## 2023-04-13 RX ORDER — MORPHINE SULFATE 2 MG/ML
2 INJECTION, SOLUTION INTRAMUSCULAR; INTRAVENOUS EVERY 5 MIN PRN
Status: DISCONTINUED | OUTPATIENT
Start: 2023-04-13 | End: 2023-04-13 | Stop reason: HOSPADM

## 2023-04-13 RX ORDER — SODIUM CHLORIDE 0.9 % (FLUSH) 0.9 %
5-40 SYRINGE (ML) INJECTION PRN
Status: DISCONTINUED | OUTPATIENT
Start: 2023-04-13 | End: 2023-04-13 | Stop reason: HOSPADM

## 2023-04-13 RX ORDER — LIDOCAINE HYDROCHLORIDE 10 MG/ML
1 INJECTION, SOLUTION EPIDURAL; INFILTRATION; INTRACAUDAL; PERINEURAL
Status: DISCONTINUED | OUTPATIENT
Start: 2023-04-13 | End: 2023-04-13 | Stop reason: HOSPADM

## 2023-04-13 RX ORDER — MIDAZOLAM HYDROCHLORIDE 2 MG/2ML
2 INJECTION, SOLUTION INTRAMUSCULAR; INTRAVENOUS
Status: COMPLETED | OUTPATIENT
Start: 2023-04-13 | End: 2023-04-13

## 2023-04-13 RX ORDER — ROPIVACAINE HYDROCHLORIDE 5 MG/ML
INJECTION, SOLUTION EPIDURAL; INFILTRATION; PERINEURAL
Status: DISCONTINUED
Start: 2023-04-13 | End: 2023-04-13 | Stop reason: HOSPADM

## 2023-04-13 RX ORDER — FAMOTIDINE 20 MG/1
20 TABLET, FILM COATED ORAL ONCE
Status: COMPLETED | OUTPATIENT
Start: 2023-04-13 | End: 2023-04-13

## 2023-04-13 RX ORDER — DROPERIDOL 2.5 MG/ML
0.62 INJECTION, SOLUTION INTRAMUSCULAR; INTRAVENOUS
Status: DISCONTINUED | OUTPATIENT
Start: 2023-04-13 | End: 2023-04-13 | Stop reason: HOSPADM

## 2023-04-13 RX ORDER — HYDROCODONE BITARTRATE AND ACETAMINOPHEN 10; 325 MG/1; MG/1
1 TABLET ORAL ONCE
Status: COMPLETED | OUTPATIENT
Start: 2023-04-13 | End: 2023-04-13

## 2023-04-13 RX ORDER — SODIUM CHLORIDE 0.9 % (FLUSH) 0.9 %
5-40 SYRINGE (ML) INJECTION EVERY 12 HOURS SCHEDULED
Status: DISCONTINUED | OUTPATIENT
Start: 2023-04-13 | End: 2023-04-13 | Stop reason: HOSPADM

## 2023-04-13 RX ORDER — METHYLPREDNISOLONE ACETATE 80 MG/ML
INJECTION, SUSPENSION INTRA-ARTICULAR; INTRALESIONAL; INTRAMUSCULAR; SOFT TISSUE PRN
Status: DISCONTINUED | OUTPATIENT
Start: 2023-04-13 | End: 2023-04-13 | Stop reason: ALTCHOICE

## 2023-04-13 RX ORDER — SODIUM CHLORIDE 9 MG/ML
INJECTION, SOLUTION INTRAVENOUS PRN
Status: DISCONTINUED | OUTPATIENT
Start: 2023-04-13 | End: 2023-04-13 | Stop reason: HOSPADM

## 2023-04-13 RX ORDER — ONDANSETRON 4 MG/1
4 TABLET, FILM COATED ORAL EVERY 8 HOURS PRN
Qty: 10 TABLET | Refills: 0 | Status: SHIPPED | OUTPATIENT
Start: 2023-04-13

## 2023-04-13 RX ORDER — SCOLOPAMINE TRANSDERMAL SYSTEM 1 MG/1
1 PATCH, EXTENDED RELEASE TRANSDERMAL
Status: DISCONTINUED | OUTPATIENT
Start: 2023-04-13 | End: 2023-04-13 | Stop reason: HOSPADM

## 2023-04-13 RX ORDER — LIDOCAINE HYDROCHLORIDE 10 MG/ML
INJECTION, SOLUTION EPIDURAL; INFILTRATION; INTRACAUDAL; PERINEURAL PRN
Status: DISCONTINUED | OUTPATIENT
Start: 2023-04-13 | End: 2023-04-13 | Stop reason: ALTCHOICE

## 2023-04-13 RX ORDER — METOCLOPRAMIDE HYDROCHLORIDE 5 MG/ML
10 INJECTION INTRAMUSCULAR; INTRAVENOUS
Status: DISCONTINUED | OUTPATIENT
Start: 2023-04-13 | End: 2023-04-13 | Stop reason: HOSPADM

## 2023-04-13 RX ORDER — HYDROCODONE BITARTRATE AND ACETAMINOPHEN 10; 325 MG/1; MG/1
1 TABLET ORAL EVERY 6 HOURS PRN
Qty: 28 TABLET | Refills: 0 | Status: SHIPPED | OUTPATIENT
Start: 2023-04-13 | End: 2023-04-18

## 2023-04-13 RX ORDER — LORAZEPAM 2 MG/ML
0.5 INJECTION INTRAMUSCULAR
Status: DISCONTINUED | OUTPATIENT
Start: 2023-04-13 | End: 2023-04-13 | Stop reason: HOSPADM

## 2023-04-13 RX ORDER — MEPERIDINE HYDROCHLORIDE 25 MG/ML
12.5 INJECTION INTRAMUSCULAR; INTRAVENOUS; SUBCUTANEOUS EVERY 5 MIN PRN
Status: DISCONTINUED | OUTPATIENT
Start: 2023-04-13 | End: 2023-04-13 | Stop reason: HOSPADM

## 2023-04-13 RX ORDER — IPRATROPIUM BROMIDE AND ALBUTEROL SULFATE 2.5; .5 MG/3ML; MG/3ML
1 SOLUTION RESPIRATORY (INHALATION)
Status: DISCONTINUED | OUTPATIENT
Start: 2023-04-13 | End: 2023-04-13 | Stop reason: HOSPADM

## 2023-04-13 RX ORDER — LABETALOL HYDROCHLORIDE 5 MG/ML
10 INJECTION, SOLUTION INTRAVENOUS
Status: DISCONTINUED | OUTPATIENT
Start: 2023-04-13 | End: 2023-04-13 | Stop reason: HOSPADM

## 2023-04-13 RX ORDER — DIPHENHYDRAMINE HYDROCHLORIDE 50 MG/ML
12.5 INJECTION INTRAMUSCULAR; INTRAVENOUS
Status: DISCONTINUED | OUTPATIENT
Start: 2023-04-13 | End: 2023-04-13 | Stop reason: HOSPADM

## 2023-04-13 RX ORDER — SODIUM CHLORIDE, SODIUM LACTATE, POTASSIUM CHLORIDE, CALCIUM CHLORIDE 600; 310; 30; 20 MG/100ML; MG/100ML; MG/100ML; MG/100ML
INJECTION, SOLUTION INTRAVENOUS CONTINUOUS
Status: DISCONTINUED | OUTPATIENT
Start: 2023-04-13 | End: 2023-04-13 | Stop reason: HOSPADM

## 2023-04-13 RX ORDER — MIDAZOLAM HYDROCHLORIDE 2 MG/2ML
2 INJECTION, SOLUTION INTRAMUSCULAR; INTRAVENOUS ONCE
Status: COMPLETED | OUTPATIENT
Start: 2023-04-13 | End: 2023-04-13

## 2023-04-13 RX ADMIN — HYDROCODONE BITARTRATE AND ACETAMINOPHEN 1 TABLET: 10; 325 TABLET ORAL at 14:41

## 2023-04-13 RX ADMIN — FAMOTIDINE 20 MG: 20 TABLET ORAL at 09:23

## 2023-04-13 RX ADMIN — MIDAZOLAM 2 MG: 1 INJECTION INTRAMUSCULAR; INTRAVENOUS at 09:24

## 2023-04-13 RX ADMIN — SODIUM CHLORIDE, SODIUM LACTATE, POTASSIUM CHLORIDE, AND CALCIUM CHLORIDE: 600; 310; 30; 20 INJECTION, SOLUTION INTRAVENOUS at 09:30

## 2023-04-13 RX ADMIN — MIDAZOLAM 2 MG: 1 INJECTION INTRAMUSCULAR; INTRAVENOUS at 11:59

## 2023-04-13 ASSESSMENT — PAIN SCALES - GENERAL
PAINLEVEL_OUTOF10: 5
PAINLEVEL_OUTOF10: 4
PAINLEVEL_OUTOF10: 5

## 2023-04-13 ASSESSMENT — PAIN - FUNCTIONAL ASSESSMENT: PAIN_FUNCTIONAL_ASSESSMENT: 0-10

## 2023-04-13 NOTE — DISCHARGE INSTR - DIET

## 2023-04-13 NOTE — DISCHARGE INSTR - ACTIVITY
ICE SHOULDER 5-7 X'S A DAY, PLACE CLOTH BETWEEN ICE PACK AND SKIN, ICE OFF FOR AT LEAST 30 MINUTES TO PREVENT ICE IRRITATION

## 2023-04-13 NOTE — H&P
Pt Name: Shirley Palomino  MRN: [id number]  YOB: 1985  Date of evaluation: 4/13/2023    H&P including current review of systems was updated in the paper chart and/or the document previously scanned into the record. There have been no significant changes or new problems since the original evaluation. The patient's problems continue and indications for contemplated procedure have not changed.     Electronically signed by Bran Pop MD on 4/13/2023 at 10:17 AM Likely viral gastro, No concern for moderate to severe dehydration. Will give Zofran and po challenge. Likely viral gastro, no concern for acute abdomen, increased ICP. No concern for moderate to severe dehydration. Will give Zofran and po challenge.

## 2023-04-13 NOTE — BRIEF OP NOTE
Brief Postoperative Note      Patient: Rosana Sherman  YOB: 1985  MRN: [id number]    Date of Procedure: 4/13/2023    Pre-Op Diagnosis Codes:     * Nontraumatic incomplete tear of right rotator cuff [M75.111]    Post-Op Diagnosis: Same       Procedure(s):  RIGHT SHOULDER ROTATOR CUFF REAPIR VS DEBRIDEMENT/ SUBACROMIAL DECOMPRESSION    Surgeon(s):  Shelley Cramer MD    Assistant:  * No surgical staff found *    Anesthesia: General    Estimated Blood Loss (mL): Minimal    Complications: None    Specimens:   * No specimens in log *    Implants:  * No implants in log *      Drains: * No LDAs found *    Findings: see op note      Electronically signed by Shelley Cramer MD on 4/13/2023 at 1:39 PM

## 2023-04-14 ENCOUNTER — TELEPHONE (OUTPATIENT)
Dept: ENDOCRINOLOGY | Facility: CLINIC | Age: 38
End: 2023-04-14
Payer: OTHER GOVERNMENT

## 2023-04-17 ENCOUNTER — TELEPHONE (OUTPATIENT)
Dept: ENDOCRINOLOGY | Facility: CLINIC | Age: 38
End: 2023-04-17
Payer: OTHER GOVERNMENT

## 2023-06-13 ENCOUNTER — OFFICE VISIT (OUTPATIENT)
Dept: ENDOCRINOLOGY | Facility: CLINIC | Age: 38
End: 2023-06-13
Payer: OTHER GOVERNMENT

## 2023-06-13 VITALS
DIASTOLIC BLOOD PRESSURE: 96 MMHG | SYSTOLIC BLOOD PRESSURE: 116 MMHG | BODY MASS INDEX: 37.95 KG/M2 | HEART RATE: 110 BPM | HEIGHT: 64 IN | WEIGHT: 222.3 LBS | OXYGEN SATURATION: 98 %

## 2023-06-13 DIAGNOSIS — E10.65 TYPE 1 DIABETES MELLITUS WITH HYPERGLYCEMIA: Primary | ICD-10-CM

## 2023-06-13 DIAGNOSIS — E55.9 VITAMIN D DEFICIENCY: ICD-10-CM

## 2023-06-13 DIAGNOSIS — E10.65 TYPE 1 DIABETES MELLITUS WITH HYPERGLYCEMIA: ICD-10-CM

## 2023-06-13 RX ORDER — INSULIN ASPART 100 [IU]/ML
INJECTION, SOLUTION INTRAVENOUS; SUBCUTANEOUS
Qty: 120 ML | Refills: 3 | Status: SHIPPED | OUTPATIENT
Start: 2023-06-13

## 2023-06-13 RX ORDER — BLOOD-GLUCOSE METER
1 KIT MISCELLANEOUS AS NEEDED
Qty: 1 EACH | Refills: 0 | Status: SHIPPED | OUTPATIENT
Start: 2023-06-13

## 2023-06-13 NOTE — PROGRESS NOTES
"Chief Complaint  Diabetes    Subjective          Carey Medina presents to [facility] ENDOCRINOLOGY  History of Present Illness    In office visit    37 year old female presents for follow up     Diabetes mellitus type 1     Diagnosed at age 21     Timing constant     Quality controlled     Severity moderate       Complications none    Current diabetes regimen     Insulin       Current glucose monitoring     Checks 4 times daily       Dexcom G6    See below    Review of Systems - General ROS: negative               Objective   Vital Signs:   /96   Pulse 110   Ht 162.6 cm (64\")   Wt 101 kg (222 lb 4.8 oz)   SpO2 98%   BMI 38.16 kg/m²     Physical Exam  Musculoskeletal:         General: Normal range of motion.   Neurological:      General: No focal deficit present.      Mental Status: She is alert.   Psychiatric:         Mood and Affect: Mood normal.         Thought Content: Thought content normal.         Judgment: Judgment normal.      Result Review :   The following data was reviewed by: HORACE Anglin on 03/04/2021:  Common labs          4/7/2023    14:30   Common Labs   WBC 10.0       Hemoglobin 15.4       Hematocrit 44.9       Platelets 416          Details          This result is from an external source.                       Assessment and Plan     Diagnoses and all orders for this visit:    1. Type 1 diabetes mellitus with hyperglycemia (Primary)    2. Vitamin D deficiency    Other orders  -     Insulin Aspart (novoLOG) 100 UNIT/ML injection; 120 units daily through pump , E10.65  Dispense: 120 mL; Refill: 3  -     glucose monitor monitoring kit; 1 each As Needed (to check bg).  Dispense: 1 each; Refill: 0  -     glucose blood test strip; Test 6 times daily  Dispense: 540 each; Refill: 3  -     Lancets 30G misc; Testing 6 times daily  E11.9  Dispense: 540 each; Refill: 3             Glycemic Management        Diabetes mellitus type 1 "           Ambulatory Glucose Profile Report    Days Analyzed : 2 week period ending on 06/13/23      Continuous Glucose Monitory Device:  Dexcom G6     0% very high target range  16% high target range  81% in target range  4% below target range  GMI 6.6 %  Average glucose 138 mg/dl    Interpretation : Diabetes Type 1 Controlled         She is target 81% of the time     No change today     Occasional high with high carb meal        Tandem pump with dexcom need control iQ       BASAL  -        MN - 1.85      Carb ratio          6      Sensitivity      25     No changes today     Patient agrees to wear a CGM                 Lipid Management       Not on statin        Lab Results   Component Value Date     11/04/2014             Blood Pressure Management:      Not on ACEi     Microvascular Complication Monitoring       Last eye exam overdue postponed due to pandemic      no neuropathy                 Bone Health         Vitamin d def      Cannot take vitamin d      Other Diabetes Related Aspects      Nov. 2019      TSH - nl      Elevated liver enzymes               ALT- 95     AST - 58              Follow Up   No follow-ups on file.

## 2024-04-22 ENCOUNTER — TELEPHONE (OUTPATIENT)
Dept: OTOLARYNGOLOGY | Facility: CLINIC | Age: 39
End: 2024-04-22
Payer: OTHER GOVERNMENT

## 2024-04-22 NOTE — TELEPHONE ENCOUNTER
Caller: TIGIST ROD     Relationship: SELF    Best call back number: [phone]     What is the best time to reach you: ANYTIME    Who are you requesting to speak with (clinical staff, provider,  specific staff member): CLINICAL STAFF    Do you know the name of the person who called: N/A    What was the call regarding: CALLING ABOUT REFERRAL THAT WAS SENT TO REVIEW 4/18/24    Is it okay if the provider responds through MyChart: N/A

## 2024-04-30 NOTE — PROGRESS NOTES
YOB: 1985  Location: [facility]  Location Address: [address] [place] [address], [facility], [address] [place] [address]  Location Phone: [phone]    Chief Complaint   Patient presents with    Thyroid Problem       History of Present Illness  Carey Medina is a 38 y.o. female.  Carey Medina is here for evaluation of ENT complaints. The patient has had problems with thyroid nodule. She also admits hoarseness. She denies globus sensation and dysphagia. She does admit tenderness to the right neck.    She has had a thyroid ultrasound and thyroid labs through [facility].     Reviewed:       Past Medical History:   Diagnosis Date    Eczema     Female infertility     Hypertension     Irregular periods     Obesity     Oligomenorrhea     Polycystic ovaries     Proteinuria     Type 1 diabetes mellitus     Upper respiratory infection     Visit for gynecologic examination 2015    Vitamin D deficiency        Past Surgical History:   Procedure Laterality Date    INCISION AND DRAINAGE ABSCESS      chin area    LAPAROSCOPY FOR ECTOPIC PREGNANCY      x 2    TOTAL LAPAROSCOPIC HYSTERECTOMY SALPINGO OOPHORECTOMY Bilateral 6/15/2020    Procedure: TOTAL LAPAROSCOPIC HYSTERECTOMY RIGHT SALPINGO OOPHORECTOMY, left salpingectomy;  Surgeon: Shaq Cortez DO;  Location: [facility];  Service: Obstetrics/Gynecology;  Laterality: Bilateral;       Outpatient Medications Marked as Taking for the 24 encounter (Office Visit) with Zhang Tang APRN   Medication Sig Dispense Refill    LISINOPRIL-HYDROCHLOROTHIAZIDE PO          Percocet [oxycodone-acetaminophen]    Family History   Problem Relation Age of Onset    Diabetes Mother     Diabetes Maternal Aunt     Hypertension Maternal Grandmother     Diabetes Maternal Grandmother     Osteoporosis Other        Social History     Socioeconomic History    Marital status:    Tobacco Use    Smoking status: Never    Smokeless tobacco: Never   Substance and  Sexual Activity    Alcohol use: No    Drug use: No    Sexual activity: Defer       Review of Systems   Constitutional: Negative.    HENT:  Positive for sore throat and voice change. Negative for trouble swallowing.    Respiratory: Negative.         Vitals:    05/01/24 1339   BP: 158/98   Pulse: 100   Temp: 98 °F (36.7 °C)       Body mass index is 38.79 kg/m².    Objective     Physical Exam  Vitals reviewed.   Constitutional:       Appearance: She is obese.   HENT:      Head: Normocephalic.      Right Ear: Tympanic membrane, ear canal and external ear normal.      Left Ear: Tympanic membrane, ear canal and external ear normal.      Nose: Nose normal.      Mouth/Throat:      Lips: Pink.      Mouth: Mucous membranes are moist.      Pharynx: Oropharynx is clear.   Neck:      Thyroid: Thyromegaly present.      Comments: Palpable right thyroid nodule  Musculoskeletal:      Cervical back: Full passive range of motion without pain.   Neurological:      Mental Status: She is alert.   Psychiatric:         Behavior: Behavior is cooperative.         Assessment & Plan   Diagnoses and all orders for this visit:    1. Thyroid nodule (Primary)  -     NM Thyroid Uptake & Scan; Future  -     US Guided Thyroid Biopsy; Future    2. Thyromegaly    3. Hoarseness    Other orders  -     Fine Needle Aspiration; Standing      * Surgery not found *  Orders Placed This Encounter   Procedures    NM Thyroid Uptake & Scan     Standing Status:   Future     Standing Expiration Date:   5/1/2025     Order Specific Question:   Release to patient     Answer:   Routine Release [[id number]]     Order Specific Question:   Reason for Exam:     Answer:   thyroid nodule     Order Specific Question:   Patient Pregnant     Answer:   Unknown     Order Specific Question:   Is the patient breastfeeding?     Answer:   No    US Guided Thyroid Biopsy     Standing Status:   Future     Standing Expiration Date:   5/1/2025     Order Specific Question:   What side of  the thyroid should be biopsied?     Answer:   Right     Order Specific Question:   What is the TRADS score?     Answer:   Not Applicable     Order Specific Question:   Release to patient     Answer:   Routine Release [[id number]]     Order Specific Question:   Reason for Exam:     Answer:   thyroid nodule     Will obtain thyroid uptake scan  Will obtain thyroid labs from [facility]    The patient has a thyroid nodule. I explained the pathology of thyroid nodules including the risks of cancer. The options of surgery versus an observational course were discussed. Recommendation was made for a FINE NEEDLE ASPIRATION of the nodule/mass        Return in about 5 weeks (around 6/5/2024) for Recheck with Dr. Byers.       Patient Instructions   The patient has a thyroid nodule. I explained the pathology of thyroid nodules including the risks of cancer. The options of surgery versus an observational course were discussed. Recommendation was made for a FINE NEEDLE ASPIRATION of the nodule/mass

## 2024-05-01 ENCOUNTER — OFFICE VISIT (OUTPATIENT)
Dept: OTOLARYNGOLOGY | Facility: CLINIC | Age: 39
End: 2024-05-01
Payer: OTHER GOVERNMENT

## 2024-05-01 VITALS
TEMPERATURE: 98 F | SYSTOLIC BLOOD PRESSURE: 158 MMHG | HEIGHT: 64 IN | WEIGHT: 226 LBS | BODY MASS INDEX: 38.58 KG/M2 | HEART RATE: 100 BPM | DIASTOLIC BLOOD PRESSURE: 98 MMHG

## 2024-05-01 DIAGNOSIS — R49.0 HOARSENESS: ICD-10-CM

## 2024-05-01 DIAGNOSIS — E04.1 THYROID NODULE: Primary | ICD-10-CM

## 2024-05-01 DIAGNOSIS — E01.0 THYROMEGALY: ICD-10-CM

## 2024-05-01 RX ORDER — ESCITALOPRAM OXALATE 10 MG/1
1 TABLET ORAL DAILY
COMMUNITY

## 2024-05-01 RX ORDER — INSULIN HUMAN 100 [IU]/ML
3 INJECTION, SOLUTION PARENTERAL
COMMUNITY

## 2024-05-01 RX ORDER — LISINOPRIL 10 MG/1
TABLET ORAL
COMMUNITY

## 2024-05-06 ENCOUNTER — APPOINTMENT (OUTPATIENT)
Dept: OTHER | Facility: HOSPITAL | Age: 39
End: 2024-05-06
Payer: OTHER GOVERNMENT

## 2024-05-13 ENCOUNTER — HOSPITAL ENCOUNTER (OUTPATIENT)
Dept: ULTRASOUND IMAGING | Facility: HOSPITAL | Age: 39
Discharge: HOME | End: 2024-05-13
Admitting: STUDENT IN AN ORGANIZED HEALTH CARE EDUCATION/TRAINING PROGRAM
Payer: OTHER GOVERNMENT

## 2024-05-13 DIAGNOSIS — E04.1 THYROID NODULE: ICD-10-CM

## 2024-05-13 PROCEDURE — 76942 ECHO GUIDE FOR BIOPSY: CPT

## 2024-05-13 PROCEDURE — 88172 CYTP DX EVAL FNA 1ST EA SITE: CPT | Performed by: NURSE PRACTITIONER

## 2024-05-13 PROCEDURE — 88112 CYTOPATH CELL ENHANCE TECH: CPT | Performed by: NURSE PRACTITIONER

## 2024-05-13 PROCEDURE — 88177 CYTP FNA EVAL EA ADDL: CPT | Performed by: NURSE PRACTITIONER

## 2024-05-13 RX ORDER — LIDOCAINE HYDROCHLORIDE 10 MG/ML
5 INJECTION, SOLUTION INFILTRATION; PERINEURAL ONCE
Status: DISPENSED | OUTPATIENT
Start: 2024-05-13

## 2024-05-15 LAB
BEAKER LAB AP INTRAOPERATIVE CONSULTATION: NORMAL
CYTO UR: NORMAL
LAB AP CASE REPORT: NORMAL
LAB AP DIAGNOSIS COMMENT: NORMAL
Lab: NORMAL
PATH REPORT.FINAL DX SPEC: NORMAL
PATH REPORT.GROSS SPEC: NORMAL

## 2024-05-16 ENCOUNTER — HOSPITAL ENCOUNTER (OUTPATIENT)
Dept: NUCLEAR MEDICINE | Facility: HOSPITAL | Age: 39
Discharge: HOME | End: 2024-05-16
Payer: OTHER GOVERNMENT

## 2024-05-16 DIAGNOSIS — E04.1 THYROID NODULE: ICD-10-CM

## 2024-05-16 PROCEDURE — 78014 THYROID IMAGING W/BLOOD FLOW: CPT

## 2024-05-16 PROCEDURE — A9516 IODINE I-123 SOD IODIDE MIC: HCPCS | Performed by: NURSE PRACTITIONER

## 2024-05-16 PROCEDURE — 0 SODIUM IODIDE 3.7 MBQ CAPSULE: Performed by: NURSE PRACTITIONER

## 2024-05-16 RX ORDER — SODIUM IODIDE I 123 100 UCI/1
1 CAPSULE, GELATIN COATED ORAL
Status: COMPLETED | OUTPATIENT
Start: 2024-05-16 | End: 2024-05-16

## 2024-05-16 RX ADMIN — SODIUM IODIDE I 123 1 CAPSULE: 100 CAPSULE, GELATIN COATED ORAL at 07:24

## 2024-05-17 ENCOUNTER — HOSPITAL ENCOUNTER (OUTPATIENT)
Dept: NUCLEAR MEDICINE | Facility: HOSPITAL | Age: 39
Discharge: HOME | End: 2024-05-17
Payer: OTHER GOVERNMENT

## 2024-05-22 ENCOUNTER — TELEPHONE (OUTPATIENT)
Dept: OTOLARYNGOLOGY | Facility: CLINIC | Age: 39
End: 2024-05-22
Payer: OTHER GOVERNMENT

## 2024-05-24 ENCOUNTER — TELEPHONE (OUTPATIENT)
Dept: INTERVENTIONAL RADIOLOGY/VASCULAR | Facility: HOSPITAL | Age: 39
End: 2024-05-24

## 2024-06-10 ENCOUNTER — TELEPHONE (OUTPATIENT)
Dept: OTOLARYNGOLOGY | Facility: CLINIC | Age: 39
End: 2024-06-10
Payer: OTHER GOVERNMENT

## 2024-06-10 NOTE — TELEPHONE ENCOUNTER
----- Message from Zhang Tang sent at 6/10/2024 10:42 AM CDT -----  Less than 1 % risk of malignancy. Keep f/u with Dr. Byers

## 2024-06-10 NOTE — PROGRESS NOTES
YOB: 1985  Location: [facility]  Location Address: [address] [place] [address], [facility] [address], [address] [place] [address]  Location Phone: [phone]    Chief Complaint   Patient presents with    Thyroid Problem     Follow up after biopsy       History of Present Illness  Carey Medina is a 38 y.o. female.  Carey Medina is here for follow up of ENT complaints. The patient has had problems with thyroid nodule. She has had an FNA that resulted as [place] III with 1 % chance of malignancy. She admits intermittent hoarseness. She denies dysphagia and globus sensation. She is not currently taking reflux medication.    Reviewed:    Fine Needle Aspiration: [id number]  Order: [id number]  Status: Edited Result - FINAL       Visible to patient: Yes (seen)       Next appt: 2024 at 02:45 PM in Otolaryngology (Tiburcio Byers MD)    Specimen Information: Thyroid; Fine Needle Aspirate   2 Result Notes       1 Follow-up Encounter      Component    Note to Patients    This report may contain a detailed description of human tissue sent by a health care provider to the laboratory for pathologic evaluation. The content of this report is essential for diagnosis and may provide important critical findings. This information may be unfamiliar to patients to review without a medical professional present. It is advised that the patient review this report in the presence of a health care provider who can answer questions and explain the results.   Case Report   Medical Cytology Report                           Case: [id number]                                 Authorizing Provider:  Zhang Tang APRN      Collected:           2024 11:14 AM           Ordering Location:     [facility]  Received:            2024 11:51 AM           Pathologist:           Whit Osuna MD                                                         Specimen:    Thyroid, right                                                                             Final Diagnosis   Right thyroid nodule, ultrasound-guided fine-needle aspiration, smears (4) and ThinPrep preparation (1): Consistent with a follicular lesion of undetermined significance ([place] category III).       Comment: A representative slide will be submitted for molecular studies.  The results will be reported as an addendum upon receipt from the reference laboratory.   Addendum electronically signed by Dejah Israel on 6/7/2024 at 1316  Electronically signed by Whit Osuna MD on 5/15/2024 at 1502           Study Result    Narrative & Impression   NM THYROID UPTAKE AND SCAN-     HISTORY: Right thyroid nodule; E04.1-Nontoxic single thyroid nodule     COMPARISON: Thyroid ultrasound 4/12/2024     FINDINGS: Following oral ingestion of 540 uCi of I-123, delayed anterior  and anterior oblique views of the thyroid are obtained.     Right thyroid lobe measures approximately 7 cm in length. Left thyroid  lobe measuring 6.5 cm. There is decreased activity of the superior right  thyroid lobe at the site of the mixed cystic solid (primarily cystic)  nodule. Remaining tracer uptake appears homogeneous.     4-hour iodine uptake value measures 9.8% (normal range is from 5 to  15%). 24-hour iodine uptake value 20.7% (normal range is from 15 to  30%).     IMPRESSION:  1. There is decreased activity of the superior right thyroid lobe which  corresponds to the mixed cystic solid (primarily cystic) superior right  thyroid nodule. It is difficult to discern if the decreased activity  represents the fluid/cystic component of the nodule or if it represents  true decreased uptake/cold nodule.  2. Normal iodine uptake values.     This report was signed and finalized on 5/20/2024 3:10 PM by Dr. Yanira Carrizales MD.       Past Medical History:   Diagnosis Date    Eczema     Female infertility     Hypertension     Irregular periods     Obesity     Oligomenorrhea     Polycystic ovaries   "   Proteinuria     Type 1 diabetes mellitus     Upper respiratory infection     Visit for gynecologic examination 01/30/2015    Vitamin D deficiency        Past Surgical History:   Procedure Laterality Date    INCISION AND DRAINAGE ABSCESS      chin area    LAPAROSCOPY FOR ECTOPIC PREGNANCY      x 2    TOTAL LAPAROSCOPIC HYSTERECTOMY SALPINGO OOPHORECTOMY Bilateral 6/15/2020    Procedure: TOTAL LAPAROSCOPIC HYSTERECTOMY RIGHT SALPINGO OOPHORECTOMY, left salpingectomy;  Surgeon: Shaq Cortez DO;  Location: [facility];  Service: Obstetrics/Gynecology;  Laterality: Bilateral;       Outpatient Medications Marked as Taking for the 6/13/24 encounter (Office Visit) with Tiburcio Byers MD   Medication Sig Dispense Refill    Blood Glucose Monitoring Suppl (OneTouch Verio Reflect) w/Device kit 1 each 6 (Six) Times a Day. 1 kit 0    Continuous Blood Gluc Sensor (Dexcom G6 Sensor) 1 each As Needed (glucose control). Every 10 days 27 each 3    Continuous Blood Gluc Transmit (Dexcom G6 Transmitter) misc 1 each Every 3 (Three) Months. 1 each 3    escitalopram (LEXAPRO) 10 MG tablet Take 1 tablet by mouth Daily.      glucagon (GLUCAGON EMERGENCY) 1 MG injection Inject 1 mg under the skin into the appropriate area as directed 1 (One) Time As Needed for Low Blood Sugar for up to 1 dose. 1 kit 12    glucose blood (OneTouch Verio) test strip 1 each by Other route 6 (Six) Times a Day. Use as instructed 200 each 5    glucose monitor monitoring kit 1 each As Needed (to check bg). 1 each 0    hydroCHLOROthiazide (HYDRODIURIL) 25 MG tablet Take 1 tablet by mouth Daily.      Insulin Aspart (novoLOG) 100 UNIT/ML injection 120 units daily through pump , E10.65 120 mL 3    Insulin Pen Needle (PEN NEEDLES 5/16\") 31G X 8 MM misc       insulin regular (HumuLIN R) 100 UNIT/ML injection Inject 3 Units/hr under the skin into the appropriate area as directed.      Lancets 30G misc Testing 6 times daily  E11.9 540 each 3    lisinopril " (PRINIVIL,ZESTRIL) 10 MG tablet       LISINOPRIL-HYDROCHLOROTHIAZIDE PO        Current Facility-Administered Medications for the 6/13/24 encounter (Office Visit) with Tiburcio Byers MD   Medication Dose Route Frequency Provider Last Rate Last Admin    lidocaine (XYLOCAINE) 1 % injection 5 mL  5 mL Subcutaneous Once Alejo Martins MD           Percocet [oxycodone-acetaminophen]    Family History   Problem Relation Age of Onset    Diabetes Mother     Diabetes Maternal Aunt     Hypertension Maternal Grandmother     Diabetes Maternal Grandmother     Osteoporosis Other        Social History     Socioeconomic History    Marital status:    Tobacco Use    Smoking status: Never    Smokeless tobacco: Never   Substance and Sexual Activity    Alcohol use: No    Drug use: No    Sexual activity: Defer       Review of Systems   Constitutional: Negative.    HENT:  Positive for trouble swallowing.    Respiratory: Negative.         Vitals:    06/13/24 1341   BP: 142/100   Pulse: 108   Temp: 97.7 °F (36.5 °C)       Body mass index is 38.79 kg/m².    Objective     Physical Exam  Vitals reviewed.   Constitutional:       Appearance: She is obese.   HENT:      Head: Normocephalic.      Right Ear: Tympanic membrane, ear canal and external ear normal.      Left Ear: Tympanic membrane, ear canal and external ear normal.      Nose: Nose normal.      Mouth/Throat:      Lips: Pink.      Mouth: Mucous membranes are moist.      Pharynx: Oropharynx is clear.   Neck:      Thyroid: Thyromegaly present.      Comments: Palpable right thyroid nodule  Musculoskeletal:      Cervical back: Full passive range of motion without pain.   Neurological:      Mental Status: She is alert.   Psychiatric:         Behavior: Behavior is cooperative.         Assessment & Plan   Diagnoses and all orders for this visit:    1. Thyroid nodule (Primary)  -     US Thyroid; Future    2. Hoarseness    3. Thyromegaly    Other orders  -     omeprazole  (priLOSEC) 40 MG capsule; Take 1 capsule by mouth Daily for 30 days. Take 40 mg by mouth 30 minute-1 hour before the last large meal you would eat before going to bed  Dispense: 30 capsule; Refill: 3      * Surgery not found *  Orders Placed This Encounter   Procedures    US Thyroid     Standing Status:   Future     Standing Expiration Date:   6/13/2025     Order Specific Question:   Reason for Exam:     Answer:   thyroid nodule     Order Specific Question:   Release to patient     Answer:   Routine Release [[id number]]     Ultrasound in 6 months with Dr. Byers  Reflux medication as directed  Reflux precautions  Return for problems    Return in about 4 months (around 10/13/2024) for Recheck Dr. Byers, with ultrasound.       Patient Instructions   Gastroesophageal Reflux Disease (Laryngopharyngeal Reflux), Adult  Gastroesophageal reflux disease (GERD) and/or Laryngopharyngeal Reflux, (LPR) happens when acid from your stomach flows up into the esophagus and/or throat and voicebox or larynx. When acid comes in contact with the these organs, the acid can cause soreness (inflammation). Over time, GERD may create small holes (ulcers) in the lining of the esophagus and may lead to the development of hoarseness, difficulty swallowing,   feeling of something stuck in the throat, increased mucous or drainage and even predispose to the development of malignancies, (cancer).    CAUSES   Increased body weight. This puts pressure on the stomach, making acid rise from the stomach into the esophagus.  Smoking. This increases acid production in the stomach.  Drinking alcohol. This causes decreased pressure in the lower esophageal sphincter (valve or ring of muscle between the esophagus and stomach), allowing acid from the stomach into the esophagus.  Late evening meals and a full stomach. This increases pressure and acid production in the stomach.  A malformed lower esophageal sphincter  Diet which can include avoidance of gluten  and dairy products  Age  SYMPTOMS   Burning pain in the lower part of the mid-chest behind the breastbone and in the mid-stomach area. This may occur twice a week or more often.  Trouble swallowing.  Sore throat.  Dry cough.  Asthma-like symptoms including chest tightness, shortness of breath, or wheezing.  Globus sensation-something stuck in the throat/fullness  Hoarseness  DIAGNOSIS   Your caregiver may be able to diagnose GERD based on your symptoms. In some cases, X-rays and other tests may be done to check for complications or to check the condition of your stomach and esophagus.  You may need to see another doctor.  TREATMENT   Over-the-counter or prescription medicines to help decrease acid production.   Dietary and behavioral modifications or changes may be also recommended.  HOME CARE INSTRUCTIONS   Change the factors that you can control. Ask your caregiver for guidance concerning weight loss, quitting smoking, and alcohol consumption.  Avoid foods and drinks that make your symptoms worse, and MAY include such as:  Caffeine or alcoholic drinks.  Chocolate.  Gluten containing foods  Dairy  Peppermint or mint flavorings.  Garlic and onions.  Spicy foods.  Citrus fruits, such as oranges, shirley, or limes.  Tomato-based foods such as sauce, chili, salsa, and pizza.  Fried and fatty foods.  Avoid lying down for the 3 hours prior to your bedtime or prior to taking a nap.  Eat small, frequent meals instead of large meals.  Wear loose-fitting clothing. Do not wear anything tight around your waist that causes pressure on your stomach.  Raise the head of your bed 6 to 8 inches with wood blocks to help you sleep. Extra pillows will not help.  Only take over-the-counter or prescription medicines for pain, discomfort, or fever as directed by your caregiver.  Do not take aspirin, ibuprofen, or other nonsteroidal anti-inflammatory drugs if possible (NSAIDs).  SEEK IMMEDIATE MEDICAL CARE IF:   You have pain in your arms,  neck, jaw, teeth, or back.  Your pain increases or changes in intensity or duration.  You develop nausea, vomiting, or sweating (diaphoresis).  You develop shortness of breath, or you faint.  Your vomit is green, yellow, black, or looks like coffee grounds or blood.  Your stool is red, bloody, or black.  These symptoms could be signs of other problems, such as heart disease, gastric bleeding, or esophageal bleeding.  MAKE SURE YOU:   Understand these instructions.  Will watch your condition.  Will get help right away if you are not doing well or get worse.     This information is not intended to replace advice given to you by your physician. Make sure you discuss any questions you have with your health care provider.     Modified by Tiburcio Byers MD, FACS 2016.  Document Released: 2006 Document Revised: 2016 Document Reviewed: 2016  [employer] Interactive Patient Education © [employer]     CONTACT INFORMATION:  The main office phone number is [phone]. For emergencies after hours and on weekends, this number will convert over to our answering service and the on call provider will answer. Please try to keep non emergent phone calls/ questions to office hours 9am-5pm Monday through Friday.      [employer]  As an alternative, you can sign up and use the Epic MyChart system for more direct and quicker access for non emergent questions/ problems.  [employer] allows you to send messages to your doctor, view your test results, renew your prescriptions, schedule appointments, and more. To sign up, go to [employer] and click on the Sign Up Now link in the New User? box. Enter your [employer] Activation Code exactly as it appears below along with the last four digits of your Social Security Number and your Date of Birth () to complete the sign-up process. If you do not sign up before the expiration date, you must request a new code.     [employer] Activation Code:  Activation code not generated  Current [employer] Status: Active     If you have questions, you can email [email or web address] or call [phone] to talk to our MyChart staff. Remember, MyChart is NOT to be used for urgent needs. For medical emergencies, dial 911.     IF YOU SMOKE OR USE TOBACCO PLEASE READ THE FOLLOWING:  Why is smoking bad for me?  Smoking increases the risk of heart disease, lung disease, vascular disease, stroke, and cancer. If you smoke, STOP!        IF YOU SMOKE OR USE TOBACCO PLEASE READ THE FOLLOWING:  Why is smoking bad for me?  Smoking increases the risk of heart disease, lung disease, vascular disease, stroke, and cancer. If you smoke, STOP!     For more information:  Quit Now [facility]  1-800-QUIT-NOW  [email or web address]

## 2024-06-13 ENCOUNTER — OFFICE VISIT (OUTPATIENT)
Dept: OTOLARYNGOLOGY | Facility: CLINIC | Age: 39
End: 2024-06-13
Payer: OTHER GOVERNMENT

## 2024-06-13 VITALS
HEART RATE: 108 BPM | BODY MASS INDEX: 38.58 KG/M2 | WEIGHT: 226 LBS | TEMPERATURE: 97.7 F | HEIGHT: 64 IN | DIASTOLIC BLOOD PRESSURE: 100 MMHG | SYSTOLIC BLOOD PRESSURE: 142 MMHG

## 2024-06-13 DIAGNOSIS — E04.1 THYROID NODULE: Primary | ICD-10-CM

## 2024-06-13 DIAGNOSIS — E01.0 THYROMEGALY: ICD-10-CM

## 2024-06-13 DIAGNOSIS — R49.0 HOARSENESS: ICD-10-CM

## 2024-06-13 RX ORDER — OMEPRAZOLE 40 MG/1
40 CAPSULE, DELAYED RELEASE ORAL DAILY
Qty: 30 CAPSULE | Refills: 3 | Status: SHIPPED | OUTPATIENT
Start: 2024-06-13 | End: 2024-07-13

## 2024-06-13 NOTE — PATIENT INSTRUCTIONS
Gastroesophageal Reflux Disease (Laryngopharyngeal Reflux), Adult  Gastroesophageal reflux disease (GERD) and/or Laryngopharyngeal Reflux, (LPR) happens when acid from your stomach flows up into the esophagus and/or throat and voicebox or larynx. When acid comes in contact with the these organs, the acid can cause soreness (inflammation). Over time, GERD may create small holes (ulcers) in the lining of the esophagus and may lead to the development of hoarseness, difficulty swallowing,   feeling of something stuck in the throat, increased mucous or drainage and even predispose to the development of malignancies, (cancer).    CAUSES   Increased body weight. This puts pressure on the stomach, making acid rise from the stomach into the esophagus.  Smoking. This increases acid production in the stomach.  Drinking alcohol. This causes decreased pressure in the lower esophageal sphincter (valve or ring of muscle between the esophagus and stomach), allowing acid from the stomach into the esophagus.  Late evening meals and a full stomach. This increases pressure and acid production in the stomach.  A malformed lower esophageal sphincter  Diet which can include avoidance of gluten and dairy products  Age  SYMPTOMS   Burning pain in the lower part of the mid-chest behind the breastbone and in the mid-stomach area. This may occur twice a week or more often.  Trouble swallowing.  Sore throat.  Dry cough.  Asthma-like symptoms including chest tightness, shortness of breath, or wheezing.  Globus sensation-something stuck in the throat/fullness  Hoarseness  DIAGNOSIS   Your caregiver may be able to diagnose GERD based on your symptoms. In some cases, X-rays and other tests may be done to check for complications or to check the condition of your stomach and esophagus.  You may need to see another doctor.  TREATMENT   Over-the-counter or prescription medicines to help decrease acid production.   Dietary and behavioral modifications  or changes may be also recommended.  HOME CARE INSTRUCTIONS   Change the factors that you can control. Ask your caregiver for guidance concerning weight loss, quitting smoking, and alcohol consumption.  Avoid foods and drinks that make your symptoms worse, and MAY include such as:  Caffeine or alcoholic drinks.  Chocolate.  Gluten containing foods  Dairy  Peppermint or mint flavorings.  Garlic and onions.  Spicy foods.  Citrus fruits, such as oranges, shirley, or limes.  Tomato-based foods such as sauce, chili, salsa, and pizza.  Fried and fatty foods.  Avoid lying down for the 3 hours prior to your bedtime or prior to taking a nap.  Eat small, frequent meals instead of large meals.  Wear loose-fitting clothing. Do not wear anything tight around your waist that causes pressure on your stomach.  Raise the head of your bed 6 to 8 inches with wood blocks to help you sleep. Extra pillows will not help.  Only take over-the-counter or prescription medicines for pain, discomfort, or fever as directed by your caregiver.  Do not take aspirin, ibuprofen, or other nonsteroidal anti-inflammatory drugs if possible (NSAIDs).  SEEK IMMEDIATE MEDICAL CARE IF:   You have pain in your arms, neck, jaw, teeth, or back.  Your pain increases or changes in intensity or duration.  You develop nausea, vomiting, or sweating (diaphoresis).  You develop shortness of breath, or you faint.  Your vomit is green, yellow, black, or looks like coffee grounds or blood.  Your stool is red, bloody, or black.  These symptoms could be signs of other problems, such as heart disease, gastric bleeding, or esophageal bleeding.  MAKE SURE YOU:   Understand these instructions.  Will watch your condition.  Will get help right away if you are not doing well or get worse.     This information is not intended to replace advice given to you by your physician. Make sure you discuss any questions you have with your health care provider.     Modified by Tiburcio Byers,  MD, EVELIO 2016.  Document Released: 2006 Document Revised: 2016 Document Reviewed: 2016  [employer] Interactive Patient Education  [employer]     CONTACT INFORMATION:  The main office phone number is [phone]. For emergencies after hours and on weekends, this number will convert over to our answering service and the on call provider will answer. Please try to keep non emergent phone calls/ questions to office hours 9am-5pm Monday through Friday.      [facility]  As an alternative, you can sign up and use the Epic MyChart system for more direct and quicker access for non emergent questions/ problems.  [facility] allows you to send messages to your doctor, view your test results, renew your prescriptions, schedule appointments, and more. To sign up, go to [employer] and click on the Sign Up Now link in the New User? box. Enter your [facility] Activation Code exactly as it appears below along with the last four digits of your Social Security Number and your Date of Birth () to complete the sign-up process. If you do not sign up before the expiration date, you must request a new code.     [facility] Activation Code: Activation code not generated  Current [facility] Status: Active     If you have questions, you can email [email or web address] or call [phone] to talk to our [facility] staff. Remember, [facility] is NOT to be used for urgent needs. For medical emergencies, dial 911.     IF YOU SMOKE OR USE TOBACCO PLEASE READ THE FOLLOWING:  Why is smoking bad for me?  Smoking increases the risk of heart disease, lung disease, vascular disease, stroke, and cancer. If you smoke, STOP!        IF YOU SMOKE OR USE TOBACCO PLEASE READ THE FOLLOWING:  Why is smoking bad for me?  Smoking increases the risk of heart disease, lung disease, vascular disease, stroke, and cancer. If you smoke, STOP!     For more information:  Quit Now  [place]  1-800-QUIT-NOW  https://[place].quitlogix.org/en-[place]/

## 2024-09-03 ENCOUNTER — TELEPHONE (OUTPATIENT)
Dept: OTOLARYNGOLOGY | Facility: CLINIC | Age: 39
End: 2024-09-03

## 2024-09-03 NOTE — TELEPHONE ENCOUNTER
Provider: DR. EARL    Caller: Carey Medina    Relationship to patient: Self    Best call back number: [phone]    Chief complaint: Thyroid nodule     Type of visit: ULTRASOUND & FOLLOW-UP    Requested date: TBD     If rescheduling, when is the original appointment: 10/03/24     Additional notes: MS. MEDINA NEEDS TO RESCHEDULE HER ULTRASOUND AND FOLLOW-UP.     ---UNABLE TO WARM TRANSFER

## 2024-10-09 RX ORDER — OMEPRAZOLE 40 MG/1
CAPSULE, DELAYED RELEASE ORAL
Qty: 90 CAPSULE | Refills: 3 | Status: SHIPPED | OUTPATIENT
Start: 2024-10-09

## 2024-10-15 NOTE — PROGRESS NOTES
YOB: 1985  Location: [facility]  Location Address: [address] [place] [address], [facility], [address] [place] [address]  Location Phone: [phone]    Chief Complaint   Patient presents with    Thyroid Problem     Patient states she feels like the nodule is more noticeable and wants to get it out       History of Present Illness  Carey Medina is a 39 y.o. female.  Carey Medina is here for follow up of ENT complaints. The patient has had problems with thyroid nodule.  She has had an FNA that resulted as Benicia 3 with 1% chance of malignancy.  She feels the right neck has gotten larger and tender and that the nodule is visible.  She wishes for this to be removed.       Past Medical History:   Diagnosis Date    Eczema     Female infertility     Hypertension     Irregular periods     Obesity     Oligomenorrhea     Polycystic ovaries     Proteinuria     Type 1 diabetes mellitus     Upper respiratory infection     Visit for gynecologic examination 2015    Vitamin D deficiency        Past Surgical History:   Procedure Laterality Date    INCISION AND DRAINAGE ABSCESS      chin area    LAPAROSCOPY FOR ECTOPIC PREGNANCY      x 2    TOTAL LAPAROSCOPIC HYSTERECTOMY SALPINGO OOPHORECTOMY Bilateral 6/15/2020    Procedure: TOTAL LAPAROSCOPIC HYSTERECTOMY RIGHT SALPINGO OOPHORECTOMY, left salpingectomy;  Surgeon: Shaq Cortez DO;  Location: [facility];  Service: Obstetrics/Gynecology;  Laterality: Bilateral;       Outpatient Medications Marked as Taking for the 10/17/24 encounter (Office Visit) with Tiburcio Byers MD   Medication Sig Dispense Refill    Blood Glucose Monitoring Suppl (OneTouch Verio Reflect) w/Device kit 1 each 6 (Six) Times a Day. 1 kit 0    Continuous Blood Gluc Sensor (Dexcom G6 Sensor) 1 each As Needed (glucose control). Every 10 days 27 each 3    Continuous Blood Gluc Transmit (Dexcom G6 Transmitter) misc 1 each Every 3 (Three) Months. 1 each 3    escitalopram (LEXAPRO) 10  "MG tablet Take 1 tablet by mouth Daily.      glucagon (GLUCAGON EMERGENCY) 1 MG injection Inject 1 mg under the skin into the appropriate area as directed 1 (One) Time As Needed for Low Blood Sugar for up to 1 dose. 1 kit 12    glucose blood (OneTouch Verio) test strip 1 each by Other route 6 (Six) Times a Day. Use as instructed 200 each 5    glucose monitor monitoring kit 1 each As Needed (to check bg). 1 each 0    hydroCHLOROthiazide (HYDRODIURIL) 25 MG tablet Take 1 tablet by mouth Daily.      Insulin Aspart (novoLOG) 100 UNIT/ML injection 120 units daily through pump , E10.65 120 mL 3    Insulin Pen Needle (PEN NEEDLES 5/16\") 31G X 8 MM misc       insulin regular (HumuLIN R) 100 UNIT/ML injection Inject 3 Units/hr under the skin into the appropriate area as directed.      Lancets 30G misc Testing 6 times daily  E11.9 540 each 3    lisinopril (PRINIVIL,ZESTRIL) 10 MG tablet       LISINOPRIL-HYDROCHLOROTHIAZIDE PO       omeprazole (priLOSEC) 40 MG capsule TAKE 1 CAPSULE DAILY, 30 MINUTES TO 1 HOUR BEFORE THE LAST LARGE MEAL YOU WOULD EAT BEFORE GOING TO BED. 90 capsule 3     Current Facility-Administered Medications for the 10/17/24 encounter (Office Visit) with Tiburcio Byers MD   Medication Dose Route Frequency Provider Last Rate Last Admin    lidocaine (XYLOCAINE) 1 % injection 5 mL  5 mL Subcutaneous Once Alejo Martins MD           Percocet [oxycodone-acetaminophen]    Family History   Problem Relation Age of Onset    Diabetes Mother     Diabetes Maternal Aunt     Hypertension Maternal Grandmother     Diabetes Maternal Grandmother     Osteoporosis Other        Social History     Socioeconomic History    Marital status:    Tobacco Use    Smoking status: Never    Smokeless tobacco: Never   Vaping Use    Vaping status: Never Used   Substance and Sexual Activity    Alcohol use: No    Drug use: No    Sexual activity: Defer       Review of Systems   Constitutional: Negative.    HENT: Negative.  "        Vitals:    10/17/24 1521   BP: 146/91   Pulse: 100   Temp: 97.8 °F (36.6 °C)       Body mass index is 37.76 kg/m².    Objective     Physical Exam  Vitals reviewed.   Constitutional:       Appearance: She is obese.   HENT:      Head: Normocephalic.      Right Ear: Tympanic membrane, ear canal and external ear normal.      Left Ear: Tympanic membrane, ear canal and external ear normal.      Nose: Nose normal.      Mouth/Throat:      Lips: Pink.      Mouth: Mucous membranes are moist.      Pharynx: Oropharynx is clear.   Neck:      Thyroid: Thyromegaly present.      Comments: Palpable right thyroid nodule  Musculoskeletal:      Cervical back: Full passive range of motion without pain.   Neurological:      Mental Status: She is alert.   Psychiatric:         Behavior: Behavior is cooperative.         Assessment & Plan   Diagnoses and all orders for this visit:    1. Thyroid nodule (Primary)  -     Case Request; Standing  -     Comprehensive Metabolic Panel; Future  -     CBC and Differential; Future  -     ECG 12 Lead; Future  -     XR Chest 2 View; Future  -     Case Request    2. Thyromegaly  -     Case Request; Standing  -     Comprehensive Metabolic Panel; Future  -     CBC and Differential; Future  -     ECG 12 Lead; Future  -     XR Chest 2 View; Future  -     Case Request    Other orders  -     Follow Anesthesia Guidelines / Protocol; Future  -     Provide Patient With Instructions on NPO Status  -     Follow Anesthesia Guidelines / Protocol; Standing  -     Verify NPO Status; Standing  -     SCD (Sequential Compression Device) - To Be Placed on Patient in Pre-Op; Standing  -     Patient to Void Prior to Transfer to OR; Standing  -     Instructions for Nursing; Standing      RIGHT THYROIDECTOMY (Right)  Orders Placed This Encounter   Procedures    XR Chest 2 View     Standing Status:   Future     Standing Expiration Date:   10/17/2025     Order Specific Question:   Reason for Exam:     Answer:   preop  testing     Order Specific Question:   Patient Pregnant     Answer:   Unknown     Order Specific Question:   Release to patient     Answer:   Routine Release [[id number]]    Comprehensive Metabolic Panel     Standing Status:   Future     Standing Expiration Date:   10/17/2025     Order Specific Question:   Release to patient     Answer:   Routine Release [[id number]]    Provide Patient With Instructions on NPO Status    ECG 12 Lead     Standing Status:   Future     Standing Expiration Date:   10/17/2025     Order Specific Question:   Reason for Exam:     Answer:   preop testing     Order Specific Question:   Release to patient     Answer:   Routine Release [[id number]]    CBC and Differential     Standing Status:   Future     Standing Expiration Date:   10/17/2025     Order Specific Question:   Manual Differential     Answer:   No     Order Specific Question:   Release to patient     Answer:   Routine Release [[id number]]     Right THYROIDECTOMY: A right thyroidectomy was recommended. The risks and benefits were explained including but not limited to bleeding, infection, persistent and/or recurrent disease, risks of the general anesthesia, pain, recurrent laryngeal nerve injury with hoarseness and airway loss, parathyroid injury and hypocalcemia. Operative possibilities including hemithyroidectomy, total thyroidectomy and payam dissections were discussed. Possibilities for delayed need for total thyroidectomy pending pathologic diagnosis were also discussed. Alternatives were discussed. No guarantees were made or implied. Questions were asked appropriately answered.    Dr. Byers examined and discussed care with patient and agrees with treatment plan.      Return for post op.       Patient Instructions   THYROIDECTOMY: A thyroidectomy was recommended. The risks and benefits were explained including but not limited to bleeding, infection, persistent and/or recurrent disease, risks of the general anesthesia, pain,  recurrent laryngeal nerve injury with hoarseness and airway loss, parathyroid injury and hypocalcemia. Operative possibilities including hemithyroidectomy, total thyroidectomy and payam dissections were discussed. Possibilities for delayed need for total thyroidectomy pending pathologic diagnosis were also discussed. Alternatives were discussed. No guarantees were made or implied. Questions were asked appropriately answered.

## 2024-10-17 ENCOUNTER — OFFICE VISIT (OUTPATIENT)
Dept: OTOLARYNGOLOGY | Facility: CLINIC | Age: 39
End: 2024-10-17
Payer: OTHER GOVERNMENT

## 2024-10-17 VITALS
BODY MASS INDEX: 37.56 KG/M2 | HEIGHT: 64 IN | DIASTOLIC BLOOD PRESSURE: 91 MMHG | SYSTOLIC BLOOD PRESSURE: 146 MMHG | WEIGHT: 220 LBS | TEMPERATURE: 97.8 F | HEART RATE: 100 BPM

## 2024-10-17 DIAGNOSIS — E04.1 THYROID NODULE: Primary | ICD-10-CM

## 2024-10-17 DIAGNOSIS — E01.0 THYROMEGALY: ICD-10-CM

## 2024-10-17 NOTE — PATIENT INSTRUCTIONS
THYROIDECTOMY: A thyroidectomy was recommended. The risks and benefits were explained including but not limited to bleeding, infection, persistent and/or recurrent disease, risks of the general anesthesia, pain, recurrent laryngeal nerve injury with hoarseness and airway loss, parathyroid injury and hypocalcemia. Operative possibilities including hemithyroidectomy, total thyroidectomy and payam dissections were discussed. Possibilities for delayed need for total thyroidectomy pending pathologic diagnosis were also discussed. Alternatives were discussed. No guarantees were made or implied. Questions were asked appropriately answered.

## 2024-10-18 PROBLEM — E04.1 THYROID NODULE: Status: ACTIVE | Noted: 2024-10-17

## 2024-10-18 PROBLEM — E01.0 THYROMEGALY: Status: ACTIVE | Noted: 2024-10-17

## 2024-12-11 ENCOUNTER — HOSPITAL ENCOUNTER (OUTPATIENT)
Dept: GENERAL RADIOLOGY | Facility: HOSPITAL | Age: 39
Discharge: HOME | End: 2024-12-11
Payer: OTHER GOVERNMENT

## 2024-12-11 ENCOUNTER — PRE-ADMISSION TESTING (OUTPATIENT)
Dept: PREADMISSION TESTING | Facility: HOSPITAL | Age: 39
End: 2024-12-11
Payer: OTHER GOVERNMENT

## 2024-12-11 VITALS
HEIGHT: 63 IN | WEIGHT: 217.15 LBS | RESPIRATION RATE: 18 BRPM | SYSTOLIC BLOOD PRESSURE: 152 MMHG | BODY MASS INDEX: 38.48 KG/M2 | HEART RATE: 102 BPM | DIASTOLIC BLOOD PRESSURE: 108 MMHG | OXYGEN SATURATION: 98 %

## 2024-12-11 DIAGNOSIS — E04.1 THYROID NODULE: ICD-10-CM

## 2024-12-11 DIAGNOSIS — E01.0 THYROMEGALY: ICD-10-CM

## 2024-12-11 LAB
ALBUMIN SERPL-MCNC: 4.3 G/DL (ref 3.5–5.2)
ALBUMIN/GLOB SERPL: 1.2 G/DL
ALP SERPL-CCNC: 96 U/L (ref 39–117)
ALT SERPL W P-5'-P-CCNC: 23 U/L (ref 1–33)
ANION GAP SERPL CALCULATED.3IONS-SCNC: 9 MMOL/L (ref 5–15)
AST SERPL-CCNC: 20 U/L (ref 1–32)
BASOPHILS # BLD AUTO: 0.03 10*3/MM3 (ref 0–0.2)
BASOPHILS NFR BLD AUTO: 0.3 % (ref 0–1.5)
BILIRUB SERPL-MCNC: 0.4 MG/DL (ref 0–1.2)
BUN SERPL-MCNC: 11 MG/DL (ref 6–20)
BUN/CREAT SERPL: 18.6 (ref 7–25)
CALCIUM SPEC-SCNC: 9.5 MG/DL (ref 8.6–10.5)
CHLORIDE SERPL-SCNC: 96 MMOL/L (ref 98–107)
CO2 SERPL-SCNC: 26 MMOL/L (ref 22–29)
CREAT SERPL-MCNC: 0.59 MG/DL (ref 0.57–1)
DEPRECATED RDW RBC AUTO: 41.1 FL (ref 37–54)
EGFRCR SERPLBLD CKD-EPI 2021: 117.7 ML/MIN/1.73
EOSINOPHIL # BLD AUTO: 0.06 10*3/MM3 (ref 0–0.4)
EOSINOPHIL NFR BLD AUTO: 0.7 % (ref 0.3–6.2)
ERYTHROCYTE [DISTWIDTH] IN BLOOD BY AUTOMATED COUNT: 13.4 % (ref 12.3–15.4)
GLOBULIN UR ELPH-MCNC: 3.7 GM/DL
GLUCOSE SERPL-MCNC: 148 MG/DL (ref 65–99)
HCT VFR BLD AUTO: 42.2 % (ref 34–46.6)
HGB BLD-MCNC: 14.6 G/DL (ref 12–15.9)
IMM GRANULOCYTES # BLD AUTO: 0.02 10*3/MM3 (ref 0–0.05)
IMM GRANULOCYTES NFR BLD AUTO: 0.2 % (ref 0–0.5)
LYMPHOCYTES # BLD AUTO: 1.44 10*3/MM3 (ref 0.7–3.1)
LYMPHOCYTES NFR BLD AUTO: 16 % (ref 19.6–45.3)
MCH RBC QN AUTO: 29.1 PG (ref 26.6–33)
MCHC RBC AUTO-ENTMCNC: 34.6 G/DL (ref 31.5–35.7)
MCV RBC AUTO: 84.1 FL (ref 79–97)
MONOCYTES # BLD AUTO: 0.51 10*3/MM3 (ref 0.1–0.9)
MONOCYTES NFR BLD AUTO: 5.7 % (ref 5–12)
NEUTROPHILS NFR BLD AUTO: 6.95 10*3/MM3 (ref 1.7–7)
NEUTROPHILS NFR BLD AUTO: 77.1 % (ref 42.7–76)
NRBC BLD AUTO-RTO: 0 /100 WBC (ref 0–0.2)
PLATELET # BLD AUTO: 414 10*3/MM3 (ref 140–450)
PMV BLD AUTO: 9.4 FL (ref 6–12)
POTASSIUM SERPL-SCNC: 3.5 MMOL/L (ref 3.5–5.2)
PROT SERPL-MCNC: 8 G/DL (ref 6–8.5)
RBC # BLD AUTO: 5.02 10*6/MM3 (ref 3.77–5.28)
SODIUM SERPL-SCNC: 131 MMOL/L (ref 136–145)
WBC NRBC COR # BLD AUTO: 9.01 10*3/MM3 (ref 3.4–10.8)

## 2024-12-11 PROCEDURE — 93005 ELECTROCARDIOGRAM TRACING: CPT

## 2024-12-11 PROCEDURE — 71046 X-RAY EXAM CHEST 2 VIEWS: CPT

## 2024-12-11 PROCEDURE — 85025 COMPLETE CBC W/AUTO DIFF WBC: CPT

## 2024-12-11 PROCEDURE — 80053 COMPREHEN METABOLIC PANEL: CPT

## 2024-12-11 PROCEDURE — 36415 COLL VENOUS BLD VENIPUNCTURE: CPT

## 2024-12-11 NOTE — DISCHARGE INSTRUCTIONS
Preparing for Surgery  Follow these instructions before the procedure:  Several days or weeks before your procedure      Ask your health care provider about:  Changing or stopping your regular medicines. This is especially important if you are taking diabetes medicines or blood thinners.  Taking medicines such as aspirin and ibuprofen. These medicines can thin your blood. Do not take these medicines unless your health care provider tells you to take them.  Taking over-the-counter medicines, vitamins, herbs, and supplements.    Contact your surgeon if you:  Develop a fever of more than 100.4°F (38°C) or other feelings of illness during the 48 hours before your surgery.  Have symptoms that get worse.  Have questions or concerns about your surgery.  If you are going home the same day of your surgery you will need to arrange for a responsible adult, age 18 years old or older, to drive you home from the hospital and stay with you for 24 hours. Verification of the  will be made prior to any procedure requiring sedation. You may not go home in a taxi or any form of public transportation by yourself.     Day before your procedure  Medication(s) you need to stop the day before your surgery:lisinopril    24 hours before your procedure DO NOT drink alcoholic beverages or smoke.  24 hours before your procedure STOP taking Erectile Dysfunction medication (i.e.,Cialis, Viagra)   You may be asked to shower with a germ-killing soap.  Day of your procedure   You may take the following medication(s) the morning of surgery with a sip of water: as directed by your doctor      8 hours before your scheduled arrival time, STOP all food, any dairy products, and full liquids. This includes hard candy, chewing gum or mints. This is extremely important to prevent serious complications.     Up to 2 hours before your scheduled arrival time, you may have clear liquids no cream, powder, or pulp of any kind. Safe options are water, black  coffee, plain tea, soda, Gatorade/Powerade, clear broth, apple juice.    2 hours before your scheduled arrival time, STOP drinking clear liquids.    You may need to take another shower with a germ-killing soap before you leave home in the morning. Do not use perfumes, colognes, or body lotions.  Wear comfortable loose-fitting clothing.  Remove all jewelry including body piercing and rings, dark colored nail polish, and make up prior to arrival at the hospital. Leave all valuables at home.   Bring your hearing aids if you rely on them.  Do not wear contact lenses. If you wear eyeglasses remember to bring a case to store them in while you are in surgery.  Do not use denture adhesives since you will be asked to remove them during your surgery.    You do not need to bring your home medications into the hospital.   Bring your sleep apnea device with you on the day of your surgery (if this applies to you).  If you have an Inspire implant for sleep apnea, please bring the remote with you on the day of surgery.  If you wear portable oxygen, bring it with you.   If you are staying overnight, you may bring a bag of items you may need such as slippers, robe and a change of clothes for your discharge. You may want to leave these items in the car until you are ready for them since your family will take your belongings when you leave the pre-operative area.  Arrive at the hospital as scheduled by the office. You will be asked to arrive 2 hours prior to your surgery time in order to prepare for your procedure.  When you arrive at the hospital  Go to the registration desk located at the main entrance of the hospital.  After registration is completed, you will be given a beeper and a sticker sheet. Take the stickers to Outpatient Surgery and place in the tray at the end of the desk to notify the staff that you have arrived and registered.   Return to the lobby to wait. You are not always called back according to the time of arrival  but rather the time your doctor will be ready.  When your beeper lights up and vibrates proceed through the double doors, under the stairs, and a member of the Outpatient Surgery staff will escort you to your preoperative room.   How to Use Chlorhexidine Before Surgery  Chlorhexidine gluconate (CHG) is a germ-killing (antiseptic) solution that is used to clean the skin. It can get rid of the bacteria that normally live on the skin and can keep them away for about 24 hours. To clean your skin with CHG, you may be given:  A CHG solution to use in the shower or as part of a sponge bath.  A prepackaged cloth that contains CHG.  Cleaning your skin with CHG may help lower the risk for infection:  While you are staying in the intensive care unit of the hospital.  If you have a vascular access, such as a central line, to provide short-term or long-term access to your veins.  If you have a catheter to drain urine from your bladder.  If you are on a ventilator. A ventilator is a machine that helps you breathe by moving air in and out of your lungs.  After surgery.  What are the risks?  Risks of using CHG include:  A skin reaction.  Hearing loss, if CHG gets in your ears and you have a perforated eardrum.  Eye injury, if CHG gets in your eyes and is not rinsed out.  The CHG product catching fire.  Make sure that you avoid smoking and flames after applying CHG to your skin.  Do not use CHG:  If you have a chlorhexidine allergy or have previously reacted to chlorhexidine.  On babies younger than 2 months of age.  How to use CHG solution  Use CHG only as told by your health care provider, and follow the instructions on the label.  Use the full amount of CHG as directed. Usually, this is one bottle.  During a shower    Follow these steps when using CHG solution during a shower (unless your health care provider gives you different instructions):  Start the shower.  Use your normal soap and shampoo to wash your face and hair.  Turn  off the shower or move out of the shower stream.  Pour the CHG onto a clean washcloth. Do not use any type of brush or rough-edged sponge.  Starting at your neck, lather your body down to your toes. Make sure you follow these instructions:  If you will be having surgery, pay special attention to the part of your body where you will be having surgery. Scrub this area for at least 1 minute.  Do not use CHG on your head or face. If the solution gets into your ears or eyes, rinse them well with water.  Avoid your genital area.  Avoid any areas of skin that have broken skin, cuts, or scrapes.  Scrub your back and under your arms. Make sure to wash skin folds.  Let the lather sit on your skin for 1-2 minutes or as long as told by your health care provider.  Thoroughly rinse your entire body in the shower. Make sure that all body creases and crevices are rinsed well.  Dry off with a clean towel. Do not put any substances on your body afterward--such as powder, lotion, or perfume--unless you are told to do so by your health care provider. Only use lotions that are recommended by the .  Put on clean clothes or pajamas.  If it is the night before your surgery, sleep in clean sheets.     During a sponge bath  Follow these steps when using CHG solution during a sponge bath (unless your health care provider gives you different instructions):  Use your normal soap and shampoo to wash your face and hair.  Pour the CHG onto a clean washcloth.  Starting at your neck, lather your body down to your toes. Make sure you follow these instructions:  If you will be having surgery, pay special attention to the part of your body where you will be having surgery. Scrub this area for at least 1 minute.  Do not use CHG on your head or face. If the solution gets into your ears or eyes, rinse them well with water.  Avoid your genital area.  Avoid any areas of skin that have broken skin, cuts, or scrapes.  Scrub your back and under  your arms. Make sure to wash skin folds.  Let the lather sit on your skin for 1-2 minutes or as long as told by your health care provider.  Using a different clean, wet washcloth, thoroughly rinse your entire body. Make sure that all body creases and crevices are rinsed well.  Dry off with a clean towel. Do not put any substances on your body afterward--such as powder, lotion, or perfume--unless you are told to do so by your health care provider. Only use lotions that are recommended by the .  Put on clean clothes or pajamas.  If it is the night before your surgery, sleep in clean sheets.  How to use CHG prepackaged cloths  Only use CHG cloths as told by your health care provider, and follow the instructions on the label.  Use the CHG cloth on clean, dry skin.  Do not use the CHG cloth on your head or face unless your health care provider tells you to.  When washing with the CHG cloth:  Avoid your genital area.  Avoid any areas of skin that have broken skin, cuts, or scrapes.  Before surgery    Follow these steps when using a CHG cloth to clean before surgery (unless your health care provider gives you different instructions):  Using the CHG cloth, vigorously scrub the part of your body where you will be having surgery. Scrub using a back-and-forth motion for 3 minutes. The area on your body should be completely wet with CHG when you are done scrubbing.  Do not rinse. Discard the cloth and let the area air-dry. Do not put any substances on the area afterward, such as powder, lotion, or perfume.  Put on clean clothes or pajamas.  If it is the night before your surgery, sleep in clean sheets.     For general bathing  Follow these steps when using CHG cloths for general bathing (unless your health care provider gives you different instructions).  Use a separate CHG cloth for each area of your body. Make sure you wash between any folds of skin and between your fingers and toes. Wash your body in the  following order, switching to a new cloth after each step:  The front of your neck, shoulders, and chest.  Both of your arms, under your arms, and your hands.  Your stomach and groin area, avoiding the genitals.  Your right leg and foot.  Your left leg and foot.  The back of your neck, your back, and your buttocks.  Do not rinse. Discard the cloth and let the area air-dry. Do not put any substances on your body afterward--such as powder, lotion, or perfume--unless you are told to do so by your health care provider. Only use lotions that are recommended by the .  Put on clean clothes or pajamas.  Contact a health care provider if:  Your skin gets irritated after scrubbing.  You have questions about using your solution or cloth.  You swallow any chlorhexidine. Call your local poison control center (1-799.675.2722 in the [place]  Get help right away if:  Your eyes itch badly, or they become very red or swollen.  Your skin itches badly and is red or swollen.  Your hearing changes.  You have trouble seeing.  You have swelling or tingling in your mouth or throat.  You have trouble breathing.  These symptoms may represent a serious problem that is an emergency. Do not wait to see if the symptoms will go away. Get medical help right away. Call your local emergency services (630 in the [place] Do not drive yourself to the hospital.  Summary  Chlorhexidine gluconate (CHG) is a germ-killing (antiseptic) solution that is used to clean the skin. Cleaning your skin with CHG may help to lower your risk for infection.  You may be given CHG to use for bathing. It may be in a bottle or in a prepackaged cloth to use on your skin. Carefully follow your health care provider's instructions and the instructions on the product label.  Do not use CHG if you have a chlorhexidine allergy.  Contact your health care provider if your skin gets irritated after scrubbing.  This information is not intended to replace advice given to you  by your health care provider. Make sure you discuss any questions you have with your health care provider.  Document Revised: 04/17/2023 Document Reviewed: 02/28/2022  [employer]vier Patient Education © 2023 [employer]

## 2024-12-12 LAB
QT INTERVAL: 358 MS
QTC INTERVAL: 473 MS

## 2024-12-18 ENCOUNTER — HOSPITAL ENCOUNTER (OUTPATIENT)
Facility: HOSPITAL | Age: 39
Discharge: HOME | End: 2024-12-19
Attending: OTOLARYNGOLOGY | Admitting: OTOLARYNGOLOGY
Payer: OTHER GOVERNMENT

## 2024-12-18 ENCOUNTER — ANESTHESIA EVENT (OUTPATIENT)
Dept: PERIOP | Facility: HOSPITAL | Age: 39
End: 2024-12-18
Payer: OTHER GOVERNMENT

## 2024-12-18 ENCOUNTER — ANESTHESIA (OUTPATIENT)
Dept: PERIOP | Facility: HOSPITAL | Age: 39
End: 2024-12-18
Payer: OTHER GOVERNMENT

## 2024-12-18 DIAGNOSIS — E04.1 THYROID NODULE: ICD-10-CM

## 2024-12-18 DIAGNOSIS — E01.0 THYROMEGALY: ICD-10-CM

## 2024-12-18 PROBLEM — E07.9 THYROID MASS: Status: ACTIVE | Noted: 2024-12-18

## 2024-12-18 PROBLEM — E10.9 DIABETES MELLITUS TYPE 1: Status: ACTIVE | Noted: 2024-12-18

## 2024-12-18 PROBLEM — I10 ESSENTIAL HYPERTENSION: Status: ACTIVE | Noted: 2024-12-18

## 2024-12-18 LAB
ANION GAP SERPL CALCULATED.3IONS-SCNC: 9 MMOL/L (ref 5–15)
BUN SERPL-MCNC: 9 MG/DL (ref 6–20)
BUN/CREAT SERPL: 14.8 (ref 7–25)
CALCIUM SPEC-SCNC: 8.9 MG/DL (ref 8.6–10.5)
CHLORIDE SERPL-SCNC: 100 MMOL/L (ref 98–107)
CO2 SERPL-SCNC: 26 MMOL/L (ref 22–29)
CREAT SERPL-MCNC: 0.61 MG/DL (ref 0.57–1)
EGFRCR SERPLBLD CKD-EPI 2021: 116.8 ML/MIN/1.73
GLUCOSE BLDC GLUCOMTR-MCNC: 158 MG/DL (ref 70–130)
GLUCOSE BLDC GLUCOMTR-MCNC: 162 MG/DL (ref 70–130)
GLUCOSE BLDC GLUCOMTR-MCNC: 165 MG/DL (ref 70–130)
GLUCOSE BLDC GLUCOMTR-MCNC: 192 MG/DL (ref 70–130)
GLUCOSE SERPL-MCNC: 171 MG/DL (ref 65–99)
POTASSIUM SERPL-SCNC: 4 MMOL/L (ref 3.5–5.2)
SODIUM SERPL-SCNC: 135 MMOL/L (ref 136–145)

## 2024-12-18 PROCEDURE — 25010000002 PROPOFOL 10 MG/ML EMULSION: Performed by: NURSE ANESTHETIST, CERTIFIED REGISTERED

## 2024-12-18 PROCEDURE — 60220 PARTIAL REMOVAL OF THYROID: CPT | Performed by: OTOLARYNGOLOGY

## 2024-12-18 PROCEDURE — 25010000002 ONDANSETRON PER 1 MG: Performed by: NURSE ANESTHETIST, CERTIFIED REGISTERED

## 2024-12-18 PROCEDURE — 25010000002 ONDANSETRON PER 1 MG: Performed by: ANESTHESIOLOGY

## 2024-12-18 PROCEDURE — 25010000002 LIDOCAINE 1% - EPINEPHRINE 1:100000 1 %-1:100000 SOLUTION: Performed by: OTOLARYNGOLOGY

## 2024-12-18 PROCEDURE — 63710000001 INSULIN LISPRO (HUMAN) PER 5 UNITS: Performed by: NURSE PRACTITIONER

## 2024-12-18 PROCEDURE — 25010000002 MORPHINE PER 10 MG: Performed by: OTOLARYNGOLOGY

## 2024-12-18 PROCEDURE — 80048 BASIC METABOLIC PNL TOTAL CA: CPT | Performed by: NURSE PRACTITIONER

## 2024-12-18 PROCEDURE — 25010000002 POTASSIUM CHLORIDE PER 2 MEQ: Performed by: OTOLARYNGOLOGY

## 2024-12-18 PROCEDURE — 25010000002 FENTANYL CITRATE (PF) 50 MCG/ML SOLUTION: Performed by: NURSE ANESTHETIST, CERTIFIED REGISTERED

## 2024-12-18 PROCEDURE — 25010000002 MIDAZOLAM PER 1MG: Performed by: ANESTHESIOLOGY

## 2024-12-18 PROCEDURE — 82948 REAGENT STRIP/BLOOD GLUCOSE: CPT

## 2024-12-18 PROCEDURE — G0378 HOSPITAL OBSERVATION PER HR: HCPCS

## 2024-12-18 PROCEDURE — 25010000002 LIDOCAINE PF 2% 2 % SOLUTION: Performed by: NURSE ANESTHETIST, CERTIFIED REGISTERED

## 2024-12-18 PROCEDURE — 88307 TISSUE EXAM BY PATHOLOGIST: CPT | Performed by: OTOLARYNGOLOGY

## 2024-12-18 PROCEDURE — 25010000002 ONDANSETRON PER 1 MG: Performed by: OTOLARYNGOLOGY

## 2024-12-18 PROCEDURE — 25810000003 LACTATED RINGERS PER 1000 ML: Performed by: OTOLARYNGOLOGY

## 2024-12-18 DEVICE — LIGACLIP MCA MULTIPLE CLIP APPLIERS, 20 SMALL CLIPS
Type: IMPLANTABLE DEVICE | Site: NECK | Status: FUNCTIONAL
Brand: LIGACLIP

## 2024-12-18 RX ORDER — SODIUM CHLORIDE 0.9 % (FLUSH) 0.9 %
3 SYRINGE (ML) INJECTION AS NEEDED
Status: DISCONTINUED | OUTPATIENT
Start: 2024-12-18 | End: 2024-12-18 | Stop reason: HOSPADM

## 2024-12-18 RX ORDER — FENTANYL CITRATE 50 UG/ML
INJECTION, SOLUTION INTRAMUSCULAR; INTRAVENOUS AS NEEDED
Status: DISCONTINUED | OUTPATIENT
Start: 2024-12-18 | End: 2024-12-18 | Stop reason: SURG

## 2024-12-18 RX ORDER — ONDANSETRON 2 MG/ML
INJECTION INTRAMUSCULAR; INTRAVENOUS AS NEEDED
Status: DISCONTINUED | OUTPATIENT
Start: 2024-12-18 | End: 2024-12-18 | Stop reason: SURG

## 2024-12-18 RX ORDER — IBUPROFEN 600 MG/1
600 TABLET, FILM COATED ORAL EVERY 6 HOURS PRN
Status: DISCONTINUED | OUTPATIENT
Start: 2024-12-18 | End: 2024-12-18 | Stop reason: HOSPADM

## 2024-12-18 RX ORDER — HYDROCHLOROTHIAZIDE 25 MG/1
25 TABLET ORAL NIGHTLY
Status: DISCONTINUED | OUTPATIENT
Start: 2024-12-18 | End: 2024-12-19

## 2024-12-18 RX ORDER — ROCURONIUM BROMIDE 10 MG/ML
INJECTION, SOLUTION INTRAVENOUS AS NEEDED
Status: DISCONTINUED | OUTPATIENT
Start: 2024-12-18 | End: 2024-12-18 | Stop reason: SURG

## 2024-12-18 RX ORDER — ACETAMINOPHEN 500 MG
1000 TABLET ORAL ONCE
Status: COMPLETED | OUTPATIENT
Start: 2024-12-18 | End: 2024-12-18

## 2024-12-18 RX ORDER — LISINOPRIL 10 MG/1
10 TABLET ORAL DAILY
Status: DISCONTINUED | OUTPATIENT
Start: 2024-12-19 | End: 2024-12-19 | Stop reason: HOSPADM

## 2024-12-18 RX ORDER — SODIUM CHLORIDE, SODIUM LACTATE, POTASSIUM CHLORIDE, CALCIUM CHLORIDE 600; 310; 30; 20 MG/100ML; MG/100ML; MG/100ML; MG/100ML
1000 INJECTION, SOLUTION INTRAVENOUS CONTINUOUS
Status: DISCONTINUED | OUTPATIENT
Start: 2024-12-18 | End: 2024-12-18

## 2024-12-18 RX ORDER — IBUPROFEN 600 MG/1
1 TABLET ORAL ONCE AS NEEDED
Status: DISCONTINUED | OUTPATIENT
Start: 2024-12-18 | End: 2024-12-19 | Stop reason: HOSPADM

## 2024-12-18 RX ORDER — SODIUM CHLORIDE 0.9 % (FLUSH) 0.9 %
3-10 SYRINGE (ML) INJECTION AS NEEDED
Status: DISCONTINUED | OUTPATIENT
Start: 2024-12-18 | End: 2024-12-18 | Stop reason: HOSPADM

## 2024-12-18 RX ORDER — LIDOCAINE HYDROCHLORIDE 20 MG/ML
INJECTION, SOLUTION EPIDURAL; INFILTRATION; INTRACAUDAL; PERINEURAL AS NEEDED
Status: DISCONTINUED | OUTPATIENT
Start: 2024-12-18 | End: 2024-12-18 | Stop reason: SURG

## 2024-12-18 RX ORDER — SCOLOPAMINE TRANSDERMAL SYSTEM 1 MG/1
1 PATCH, EXTENDED RELEASE TRANSDERMAL ONCE
Status: DISCONTINUED | OUTPATIENT
Start: 2024-12-18 | End: 2024-12-18

## 2024-12-18 RX ORDER — SODIUM CHLORIDE AND POTASSIUM CHLORIDE 150; 450 MG/100ML; MG/100ML
100 INJECTION, SOLUTION INTRAVENOUS CONTINUOUS
Status: DISPENSED | OUTPATIENT
Start: 2024-12-18 | End: 2024-12-19

## 2024-12-18 RX ORDER — NALOXONE HCL 0.4 MG/ML
0.4 VIAL (ML) INJECTION
Status: DISCONTINUED | OUTPATIENT
Start: 2024-12-18 | End: 2024-12-19 | Stop reason: HOSPADM

## 2024-12-18 RX ORDER — LABETALOL HYDROCHLORIDE 5 MG/ML
5 INJECTION, SOLUTION INTRAVENOUS
Status: DISCONTINUED | OUTPATIENT
Start: 2024-12-18 | End: 2024-12-18 | Stop reason: HOSPADM

## 2024-12-18 RX ORDER — PANTOPRAZOLE SODIUM 40 MG/1
40 TABLET, DELAYED RELEASE ORAL
Status: DISCONTINUED | OUTPATIENT
Start: 2024-12-19 | End: 2024-12-19 | Stop reason: HOSPADM

## 2024-12-18 RX ORDER — HYDROCHLOROTHIAZIDE 25 MG/1
25 TABLET ORAL NIGHTLY
COMMUNITY

## 2024-12-18 RX ORDER — HYDROCODONE BITARTRATE AND ACETAMINOPHEN 5; 325 MG/1; MG/1
1 TABLET ORAL ONCE AS NEEDED
Status: DISCONTINUED | OUTPATIENT
Start: 2024-12-18 | End: 2024-12-18 | Stop reason: HOSPADM

## 2024-12-18 RX ORDER — PROPOFOL 10 MG/ML
VIAL (ML) INTRAVENOUS AS NEEDED
Status: DISCONTINUED | OUTPATIENT
Start: 2024-12-18 | End: 2024-12-18 | Stop reason: SURG

## 2024-12-18 RX ORDER — NALOXONE HCL 0.4 MG/ML
0.04 VIAL (ML) INJECTION AS NEEDED
Status: DISCONTINUED | OUTPATIENT
Start: 2024-12-18 | End: 2024-12-18 | Stop reason: HOSPADM

## 2024-12-18 RX ORDER — IBUPROFEN 600 MG/1
1 TABLET ORAL
Status: DISCONTINUED | OUTPATIENT
Start: 2024-12-18 | End: 2024-12-19 | Stop reason: HOSPADM

## 2024-12-18 RX ORDER — MAGNESIUM HYDROXIDE 1200 MG/15ML
LIQUID ORAL AS NEEDED
Status: DISCONTINUED | OUTPATIENT
Start: 2024-12-18 | End: 2024-12-18 | Stop reason: HOSPADM

## 2024-12-18 RX ORDER — LISINOPRIL 10 MG/1
10 TABLET ORAL DAILY
Status: DISCONTINUED | OUTPATIENT
Start: 2024-12-18 | End: 2024-12-18

## 2024-12-18 RX ORDER — HYDROCODONE BITARTRATE AND ACETAMINOPHEN 5; 325 MG/1; MG/1
1 TABLET ORAL EVERY 4 HOURS PRN
Status: DISCONTINUED | OUTPATIENT
Start: 2024-12-18 | End: 2024-12-19 | Stop reason: HOSPADM

## 2024-12-18 RX ORDER — MIDAZOLAM HYDROCHLORIDE 2 MG/2ML
2 INJECTION, SOLUTION INTRAMUSCULAR; INTRAVENOUS ONCE
Status: COMPLETED | OUTPATIENT
Start: 2024-12-18 | End: 2024-12-18

## 2024-12-18 RX ORDER — INSULIN LISPRO 100 [IU]/ML
2-7 INJECTION, SOLUTION INTRAVENOUS; SUBCUTANEOUS
Status: DISCONTINUED | OUTPATIENT
Start: 2024-12-18 | End: 2024-12-19 | Stop reason: HOSPADM

## 2024-12-18 RX ORDER — MUPIROCIN 20 MG/G
1 OINTMENT TOPICAL EVERY 8 HOURS SCHEDULED
Status: DISCONTINUED | OUTPATIENT
Start: 2024-12-18 | End: 2024-12-19 | Stop reason: HOSPADM

## 2024-12-18 RX ORDER — MUPIROCIN 20 MG/G
1 OINTMENT TOPICAL EVERY 8 HOURS SCHEDULED
Status: DISCONTINUED | OUTPATIENT
Start: 2024-12-18 | End: 2024-12-18

## 2024-12-18 RX ORDER — FENTANYL CITRATE 50 UG/ML
50 INJECTION, SOLUTION INTRAMUSCULAR; INTRAVENOUS
Status: DISCONTINUED | OUTPATIENT
Start: 2024-12-18 | End: 2024-12-18 | Stop reason: HOSPADM

## 2024-12-18 RX ORDER — LIDOCAINE HYDROCHLORIDE AND EPINEPHRINE 10; 10 MG/ML; UG/ML
INJECTION, SOLUTION INFILTRATION; PERINEURAL AS NEEDED
Status: DISCONTINUED | OUTPATIENT
Start: 2024-12-18 | End: 2024-12-18 | Stop reason: HOSPADM

## 2024-12-18 RX ORDER — SODIUM CHLORIDE 9 MG/ML
40 INJECTION, SOLUTION INTRAVENOUS AS NEEDED
Status: DISCONTINUED | OUTPATIENT
Start: 2024-12-18 | End: 2024-12-18 | Stop reason: HOSPADM

## 2024-12-18 RX ORDER — MIDAZOLAM HYDROCHLORIDE 2 MG/2ML
1 INJECTION, SOLUTION INTRAMUSCULAR; INTRAVENOUS
Status: DISCONTINUED | OUTPATIENT
Start: 2024-12-18 | End: 2024-12-18 | Stop reason: HOSPADM

## 2024-12-18 RX ORDER — ACETAMINOPHEN 500 MG
TABLET ORAL
Status: COMPLETED
Start: 2024-12-18 | End: 2024-12-18

## 2024-12-18 RX ORDER — NICOTINE POLACRILEX 4 MG
15 LOZENGE BUCCAL
Status: DISCONTINUED | OUTPATIENT
Start: 2024-12-18 | End: 2024-12-19 | Stop reason: HOSPADM

## 2024-12-18 RX ORDER — SODIUM CHLORIDE 0.9 % (FLUSH) 0.9 %
3 SYRINGE (ML) INJECTION EVERY 12 HOURS SCHEDULED
Status: DISCONTINUED | OUTPATIENT
Start: 2024-12-18 | End: 2024-12-18 | Stop reason: HOSPADM

## 2024-12-18 RX ORDER — FLUMAZENIL 0.1 MG/ML
0.2 INJECTION INTRAVENOUS AS NEEDED
Status: DISCONTINUED | OUTPATIENT
Start: 2024-12-18 | End: 2024-12-18 | Stop reason: HOSPADM

## 2024-12-18 RX ORDER — LIDOCAINE HYDROCHLORIDE 10 MG/ML
0.5 INJECTION, SOLUTION EPIDURAL; INFILTRATION; INTRACAUDAL; PERINEURAL ONCE AS NEEDED
Status: DISCONTINUED | OUTPATIENT
Start: 2024-12-18 | End: 2024-12-18 | Stop reason: HOSPADM

## 2024-12-18 RX ORDER — HYDROMORPHONE HYDROCHLORIDE 1 MG/ML
0.5 INJECTION, SOLUTION INTRAMUSCULAR; INTRAVENOUS; SUBCUTANEOUS
Status: DISCONTINUED | OUTPATIENT
Start: 2024-12-18 | End: 2024-12-18 | Stop reason: HOSPADM

## 2024-12-18 RX ORDER — ONDANSETRON 2 MG/ML
4 INJECTION INTRAMUSCULAR; INTRAVENOUS
Status: DISCONTINUED | OUTPATIENT
Start: 2024-12-18 | End: 2024-12-18 | Stop reason: HOSPADM

## 2024-12-18 RX ORDER — DEXTROSE MONOHYDRATE 25 G/50ML
25 INJECTION, SOLUTION INTRAVENOUS
Status: DISCONTINUED | OUTPATIENT
Start: 2024-12-18 | End: 2024-12-19 | Stop reason: HOSPADM

## 2024-12-18 RX ORDER — ONDANSETRON 2 MG/ML
4 INJECTION INTRAMUSCULAR; INTRAVENOUS ONCE AS NEEDED
Status: COMPLETED | OUTPATIENT
Start: 2024-12-18 | End: 2024-12-18

## 2024-12-18 RX ORDER — SODIUM CHLORIDE, SODIUM LACTATE, POTASSIUM CHLORIDE, CALCIUM CHLORIDE 600; 310; 30; 20 MG/100ML; MG/100ML; MG/100ML; MG/100ML
100 INJECTION, SOLUTION INTRAVENOUS CONTINUOUS
Status: DISCONTINUED | OUTPATIENT
Start: 2024-12-18 | End: 2024-12-18

## 2024-12-18 RX ORDER — MUPIROCIN 20 MG/G
OINTMENT TOPICAL AS NEEDED
Status: DISCONTINUED | OUTPATIENT
Start: 2024-12-18 | End: 2024-12-18 | Stop reason: HOSPADM

## 2024-12-18 RX ADMIN — MIDAZOLAM HYDROCHLORIDE 2 MG: 1 INJECTION, SOLUTION INTRAMUSCULAR; INTRAVENOUS at 12:11

## 2024-12-18 RX ADMIN — FENTANYL CITRATE 150 MCG: 50 INJECTION, SOLUTION INTRAMUSCULAR; INTRAVENOUS at 12:57

## 2024-12-18 RX ADMIN — ONDANSETRON 4 MG: 2 INJECTION INTRAMUSCULAR; INTRAVENOUS at 20:59

## 2024-12-18 RX ADMIN — ONDANSETRON 4 MG: 2 INJECTION INTRAMUSCULAR; INTRAVENOUS at 14:01

## 2024-12-18 RX ADMIN — Medication 1000 MG: at 12:08

## 2024-12-18 RX ADMIN — FENTANYL CITRATE 100 MCG: 50 INJECTION, SOLUTION INTRAMUSCULAR; INTRAVENOUS at 13:23

## 2024-12-18 RX ADMIN — HYDROCODONE BITARTRATE AND ACETAMINOPHEN 1 TABLET: 5; 325 TABLET ORAL at 20:56

## 2024-12-18 RX ADMIN — ACETAMINOPHEN TAB 500 MG 1000 MG: 500 TAB at 12:08

## 2024-12-18 RX ADMIN — SCOLOPAMINE TRANSDERMAL SYSTEM 1 PATCH: 1 PATCH, EXTENDED RELEASE TRANSDERMAL at 12:09

## 2024-12-18 RX ADMIN — HYDROCHLOROTHIAZIDE 25 MG: 25 TABLET ORAL at 20:56

## 2024-12-18 RX ADMIN — INSULIN LISPRO 2 UNITS: 100 INJECTION, SOLUTION INTRAVENOUS; SUBCUTANEOUS at 17:44

## 2024-12-18 RX ADMIN — HYDROCODONE BITARTRATE AND ACETAMINOPHEN 1 TABLET: 5; 325 TABLET ORAL at 16:49

## 2024-12-18 RX ADMIN — SODIUM CHLORIDE, POTASSIUM CHLORIDE, SODIUM LACTATE AND CALCIUM CHLORIDE 1000 ML: 600; 310; 30; 20 INJECTION, SOLUTION INTRAVENOUS at 09:45

## 2024-12-18 RX ADMIN — LISINOPRIL 10 MG: 10 TABLET ORAL at 16:50

## 2024-12-18 RX ADMIN — PROPOFOL 180 MG: 10 INJECTION, EMULSION INTRAVENOUS at 12:57

## 2024-12-18 RX ADMIN — PROPOFOL 70 MG: 10 INJECTION, EMULSION INTRAVENOUS at 13:23

## 2024-12-18 RX ADMIN — MORPHINE SULFATE 4 MG: 4 INJECTION, SOLUTION INTRAMUSCULAR; INTRAVENOUS at 22:44

## 2024-12-18 RX ADMIN — POTASSIUM CHLORIDE AND SODIUM CHLORIDE 100 ML/HR: 450; 150 INJECTION, SOLUTION INTRAVENOUS at 16:49

## 2024-12-18 RX ADMIN — ROCURONIUM BROMIDE 10 MG: 10 INJECTION, SOLUTION INTRAVENOUS at 12:57

## 2024-12-18 RX ADMIN — LIDOCAINE HYDROCHLORIDE 100 MG: 20 INJECTION, SOLUTION EPIDURAL; INFILTRATION; INTRACAUDAL; PERINEURAL at 12:57

## 2024-12-18 RX ADMIN — ONDANSETRON 4 MG: 2 INJECTION INTRAMUSCULAR; INTRAVENOUS at 14:37

## 2024-12-18 RX ADMIN — INSULIN LISPRO 2 UNITS: 100 INJECTION, SOLUTION INTRAVENOUS; SUBCUTANEOUS at 20:56

## 2024-12-18 NOTE — ANESTHESIA POSTPROCEDURE EVALUATION
Patient: Carey Medina    Procedure Summary       Date: 12/18/24 Room / Location:  [facility] OR 02 /  [facility]    Anesthesia Start: 1256 Anesthesia Stop: 1422    Procedure: RIGHT THYROIDECTOMY (Right: Neck) Diagnosis:       Thyroid nodule      Thyromegaly      (Thyroid nodule [E04.1])      (Thyromegaly [E01.0])    Surgeons: Tiburcio Byers MD Provider: Jazmin Jensen CRNA    Anesthesia Type: general ASA Status: 3            Anesthesia Type: general    Vitals  Vitals Value Taken Time   /78 12/18/24 1447   Temp 99.1 °F (37.3 °C) 12/18/24 1445   Pulse 95 12/18/24 1454   Resp 10 12/18/24 1445   SpO2 95 % 12/18/24 1454   Vitals shown include unfiled device data.        Post Anesthesia Care and Evaluation    Patient location during evaluation: PACU  Patient participation: complete - patient participated  Level of consciousness: awake and alert  Pain management: adequate    Airway patency: patent  Anesthetic complications: No anesthetic complications  PONV Status: none  Cardiovascular status: acceptable and hemodynamically stable  Respiratory status: acceptable  Hydration status: acceptable    Comments: Blood pressure 123/60, pulse 84, temperature 98.6 °F (37 °C), temperature source Oral, resp. rate 16, last menstrual period 03/18/2020, SpO2 96%.    Patient discharged from PACU based upon Pernell score. Please see RN notes for further details

## 2024-12-18 NOTE — PLAN OF CARE
Problem: Adult Inpatient Plan of Care  Goal: Plan of Care Review  Outcome: Progressing  Flowsheets (Taken 12/18/2024 1706)  Outcome Evaluation: Patient 1L NC O2. IV CDI, IVF infusing per order. C/O pain, see MAR. Up standby. Neck site with mepilex and SIVAKUMAR drain. VSS, safety maintained.

## 2024-12-18 NOTE — ANESTHESIA PROCEDURE NOTES
Airway  Urgency: elective    Date/Time: 12/18/2024 1:00 PM  Airway not difficult    General Information and Staff    Patient location during procedure: OR  CRNA/CAA: Jazmin Jensen CRNA    Indications and Patient Condition  Indications for airway management: airway protection    Preoxygenated: yes  Mask difficulty assessment: 1 - vent by mask    Final Airway Details  Final airway type: endotracheal airway      Successful airway: ETT and NIM tube  Cuffed: yes   Successful intubation technique: direct laryngoscopy  Facilitating devices/methods: intubating stylet  Endotracheal tube insertion site: oral  Blade: Nasreen  Blade size: 3.5.  ETT size (mm): 7.0  Cormack-Lehane Classification: grade I - full view of glottis  Placement verified by: chest auscultation and capnometry   Cuff volume (mL): 7  Measured from: teeth  ETT/EBT  to teeth (cm): 20  Number of attempts at approach: 1  Assessment: lips, teeth, and gum same as pre-op and atraumatic intubation

## 2024-12-18 NOTE — CONSULTS
[facility] Medicine Consult  Consults    Date of Admission: 12/18/2024  Date of Consult: 12/18/24    Primary Care Physician: Sidney Winter APRN  Referring Physician: Tiburcio Byers MD  Chief Complaint/Reason for Consultation: Assistance in management of diabetes    Subjective   History of Present Illness  Carey Medina is a 39-year-old female with a past medical history significant for diabetes mellitus type 1, hypertension, thyroid nodule with fine-needle aspiration resulting [place] 3.  She has been followed by Dr. Tiburcio Byers, ENT.  Patient reported right neck had gotten larger and more tender and nodule more visible.  She desired to proceed with surgery.  She was admitted/18/24 by Dr. Tiburcio Byers for right thyroid lobectomy and isthmusectomy.  [facility] for systems of diabetes and hypertension.    Review of Systems   Constitutional:  Positive for fatigue. Negative for activity change, appetite change and unexpected weight change.   HENT:  Negative for congestion and trouble swallowing.    Eyes:  Negative for photophobia and visual disturbance.   Respiratory:  Negative for cough, shortness of breath and wheezing.    Cardiovascular:  Negative for chest pain, palpitations and leg swelling.   Gastrointestinal:  Positive for nausea. Negative for constipation, diarrhea and vomiting.   Endocrine: Negative for cold intolerance, heat intolerance and polyuria.   Genitourinary:  Negative for dysuria, frequency and urgency.   Musculoskeletal:  Negative for gait problem and joint swelling.   Skin:  Positive for wound (Neck surgical incision).   Allergic/Immunologic: Negative for immunocompromised state.   Neurological:  Positive for weakness. Negative for light-headedness.   Hematological:  Negative for adenopathy. Does not bruise/bleed easily.   Psychiatric/Behavioral:  Negative for agitation, behavioral problems and confusion.       Otherwise complete ROS is negative  except as mentioned above.    Past Medical History:   Past Medical History:   Diagnosis Date    Eczema     Female infertility     Hypertension     Irregular periods     Obesity     Oligomenorrhea     Polycystic ovaries     PONV (postoperative nausea and vomiting)     Proteinuria     Type 1 diabetes mellitus     Upper respiratory infection     Visit for gynecologic examination 01/30/2015    Vitamin D deficiency      Past Surgical History:  Past Surgical History:   Procedure Laterality Date    INCISION AND DRAINAGE ABSCESS      chin area    LAPAROSCOPY FOR ECTOPIC PREGNANCY      x 2    TOTAL LAPAROSCOPIC HYSTERECTOMY SALPINGO OOPHORECTOMY Bilateral 6/15/2020    Procedure: TOTAL LAPAROSCOPIC HYSTERECTOMY RIGHT SALPINGO OOPHORECTOMY, left salpingectomy;  Surgeon: Shaq Cortez DO;  Location: [facility];  Service: Obstetrics/Gynecology;  Laterality: Bilateral;     Social History:  reports that she has never smoked. She has never used smokeless tobacco. She reports that she does not drink alcohol and does not use drugs.    Family History: family history includes Diabetes in her maternal aunt, maternal grandmother, and mother; Hypertension in her maternal grandmother; Osteoporosis in an other family member.     Allergies:   Allergies   Allergen Reactions    Percocet [Oxycodone-Acetaminophen] Hives and Itching     Medications: Scheduled Meds:hydroCHLOROthiazide, 25 mg, Oral, Nightly  insulin lispro, 2-7 Units, Subcutaneous, 4x Daily AC & at Bedtime  [START ON 12/19/2024] lisinopril, 10 mg, Oral, Daily  mupirocin, 1 Application, Topical, Q8H  [START ON 12/19/2024] pantoprazole, 40 mg, Oral, Q AM      Continuous Infusions:sodium chloride 0.45 % with KCl 20 mEq, 100 mL/hr, Last Rate: 100 mL/hr (12/18/24 1649)      PRN Meds:.  dextrose    dextrose    Glucagon    glucagon (human recombinant)    HYDROcodone-acetaminophen    Morphine **AND** naloxone    ondansetron    Facility-Administered Medications Prior to Admission    Medication Dose Route Frequency Provider Last Rate Last Admin    [DISCONTINUED] lidocaine (XYLOCAINE) 1 % injection 5 mL  5 mL Subcutaneous Once Alejo Martins MD         Medications Prior to Admission   Medication Sig Dispense Refill Last Dose/Taking    Continuous Blood Gluc Sensor (Dexcom G6 Sensor) 1 each As Needed (glucose control). Every 10 days 27 each 3 12/17/2024    Continuous Blood Gluc Transmit (Dexcom G6 Transmitter) misc 1 each Every 3 (Three) Months. 1 each 3 12/17/2024    escitalopram (LEXAPRO) 20 MG tablet Take 1 tablet by mouth Daily.   12/17/2024 at  9:00 PM    hydroCHLOROthiazide 25 MG tablet Take 1 tablet by mouth Every Night.   12/17/2024 at  9:00 PM    Insulin Aspart (novoLOG) 100 UNIT/ML injection 120 units daily through pump , E10.65 (Patient taking differently: 99 units daily through pump , E10.65) 120 mL 3 12/17/2024    lisinopril (PRINIVIL,ZESTRIL) 10 MG tablet Take 1 tablet by mouth Daily Before Supper. Patient takes at 1700   12/16/2024 at  5:00 PM    glucagon (GLUCAGON EMERGENCY) 1 MG injection Inject 1 mg under the skin into the appropriate area as directed 1 (One) Time As Needed for Low Blood Sugar for up to 1 dose. 1 kit 12 Unknown        I have utilized all available immediate resources to obtain, update, or review the patient's current medications (including all prescriptions, over-the-counter products, herbals, cannabis/cannabidiol products, and vitamin/mineral/dietary (nutritional) supplements).     Objective   Objective    Physical Exam:   Temp:  [97.7 °F (36.5 °C)-99.7 °F (37.6 °C)] 98.6 °F (37 °C)  Heart Rate:  [] 84  Resp:  [10-18] 16  BP: (121-176)/() 123/60  Physical Exam  Vitals and nursing note reviewed.   Constitutional:       Appearance: She is obese.      Comments: Lying in bed.  No oxygen in use.   in room.   HENT:      Head: Normocephalic and atraumatic.      Comments: Cervical neck incision. Michael Avalos drain in place.     Nose: No  congestion.      Mouth/Throat:      Pharynx: Oropharynx is clear. No oropharyngeal exudate or posterior oropharyngeal erythema.   Eyes:      Extraocular Movements: Extraocular movements intact.      Pupils: Pupils are equal, round, and reactive to light.   Cardiovascular:      Rate and Rhythm: Normal rate and regular rhythm.      Heart sounds: No murmur heard.  Pulmonary:      Breath sounds: No wheezing, rhonchi or rales.      Comments: No oxygen in use  Abdominal:      Palpations: Abdomen is soft.      Tenderness: There is no abdominal tenderness.   Genitourinary:     Comments: Voiding.  Musculoskeletal:         General: No swelling or tenderness.      Cervical back: Normal range of motion and neck supple.   Skin:     General: Skin is warm and dry.   Neurological:      General: No focal deficit present.      Mental Status: She is alert and oriented to person, place, and time.   Psychiatric:         Mood and Affect: Mood normal.         Behavior: Behavior normal.         Thought Content: Thought content normal.         Judgment: Judgment normal.         Results Reviewed:  I have personally reviewed current lab, radiology, and data and agree with results.  Lab Results (last 24 hours)       Procedure Component Value Units Date/Time    POC Glucose Once [[id number]]  (Abnormal) Collected: 12/18/24 1428    Specimen: Blood Updated: 12/18/24 1440     Glucose 165 mg/dL      Comment: : [id number] Janette SulaimanuaMeter ID: [id number]       POC Glucose Once [[id number]]  (Abnormal) Collected: 12/18/24 0943    Specimen: Blood Updated: 12/18/24 0955     Glucose 158 mg/dL      Comment: : [id number] Zeeshan Ramirez  DMeter ID: [id number]             Imaging Results (Last 24 Hours)       ** No results found for the last 24 hours. **            Assessment / Plan   Assessment:   Active Hospital Problems    Diagnosis     **Thyroid mass     Diabetes mellitus type 1     Essential hypertension     Thyroid nodule     Thyromegaly          Treatment Plan    1.  Right thyroid nodule with thyromegaly.  Admitted by Dr. Tiburcio Byers 12/18/2024 for right thyroid lobectomy and isthusectomy.  Michael-Avalos drain in place.    2.  Diabetes mellitus type 1.  Patient has Dexcom monitor and insulin pump.  Insulin pump off.  Accu-Cheks with sliding scale insulin coverage.  Diabetic diet.    3.  Primary hypertension.  Blood pressure 123/69, 121/69.  Room lisinopril and HCTZ.    4.  Postop pain.  Morphine IV for severe pain, Norco for moderate pain.  Zofran for nausea.    5.  SCDs for deep vein thrombosis prophylaxis.    Medical Decision Making  Number and Complexity of problems: 4  Right thyroid nodule with thyroidectomy: Acute, high complexity poses threat to life and bodily function, not at baseline  Diabetes mellitus type 1, insulin pump: Chronic, high complexity, stable  Primary hypertension: Chronic, moderate complexity, stable  Postop pain: Acute, moderate complexity, not at baseline    Differential Diagnosis: None    Conditions and Status        Condition is unchanged.     [facility] Data  External documents reviewed: Epic.  Reviewed ENT office visits  Cardiac tracing (EKG, telemetry) interpretation: Reviewed EKG from 12/12/2024.  Sinus rhythm 105.  PVCs.  Bigeminal rhythm.  Radiology interpretation: Reviewed radiology interpretation chest x-ray 1211/20024  Labs reviewed:   CMP 12/11/2024.  Check BMP today and in AM.  CBC 12/11/2024.  CBC in AM.  Glucose 165, 158, 148    Any tests that were considered but not ordered: None     Decision rules/scores evaluated (example [id number], Wells, etc): None     Discussed with: Dr. Jacques, patient, and  Rajesh.     Care Planning  Shared decision making: Dr. Jacques, patient, and  Rajesh.  Patient and  agreed to slim pump off, Accu-Cheks with sliding scale insulin coverage, resume blood pressure medications, check BMP today and in AM.  Pain medication for postop pain.   Code status and discussions: Full  code    Disposition  Social Determinants of Health that impact treatment or disposition: None  I expect the patient to be discharged by the primary service.     I confirmed that the patient's Advance Care Plan is present, code status is documented, or surrogate decision maker is listed in the patient's medical record.     The patient's surrogate decision maker is her , Rajesh.     Patient seen and examined by me on 12/18/2024 at 1635.    Electronically signed by HORACE Oliver, 12/18/24, 17:15 CST.    The above documentation resulted from a face-to-face encounter by leonardo VU, [facility].

## 2024-12-18 NOTE — ANESTHESIA PREPROCEDURE EVALUATION
Anesthesia Evaluation     Patient summary reviewed   history of anesthetic complications:  PONV  NPO Solid Status: > 8 hours             Airway   Mallampati: II  TM distance: <3 FB  Neck ROM: full  Large neck circumference  Dental    (+) poor dentition    Pulmonary    (+) ,recent URI resolved  Cardiovascular   Exercise tolerance: excellent (>7 METS)    ECG reviewed    (+) hypertension  (-) pacemaker, valvular problems/murmurs, past MI      Neuro/Psych  GI/Hepatic/Renal/Endo    (+) obesity, morbid obesity, GERD, diabetes mellitus type 1    Musculoskeletal     Abdominal   (+) obese   Substance History      OB/GYN          Other                          Anesthesia Plan    ASA 3     general     (Long discussion of EKG followup. No symptoms, now or prior. OK to proceed. Type 1 dm. Glucose check during procedure. Pump off )  intravenous induction     Anesthetic plan, risks, benefits, and alternatives have been provided, discussed and informed consent has been obtained with: patient.

## 2024-12-18 NOTE — OP NOTE
OPERATIVE NOTE  12/18/2024    NAME: Carey Medina    YOB: 1985  MRN: [id number]    PRE-OPERATIVE DIAGNOSIS:    Thyroid nodule [E04.1]  Thyromegaly [E01.0]    POST-OPERATIVE DIAGNOSIS:   Post-Op Diagnosis Codes:     * Thyroid nodule [E04.1]     * Thyromegaly [E01.0]    PROCEDURE PERFORMED:   Right thyroid lobectomy and isthmusectomy    SURGEON:   Tiburcio Byers MD    ASSISTANT(S):   None    ANESTHESIA:   General Anesthesia via Endotracheal Tube    INDICATIONS: The patient is a 39 y.o. female with Thyroid nodule [E04.1]  Thyromegaly [E01.0]    PROCEDURE:  The patient was brought to the operating room, given General Anesthesia via Endotracheal Tube, and prepped and draped in the usual manner.     The recurrent laryngeal nerve was monitored throughout the case utilizing technologist assisted nerve monitoring via Nuvasive.    Approximately 10 mL of 1% Xylocaine with epinephrine was injected subcutaneously and an incision made 2 fingerbreadths above the manubrium for approximately 6 cm utilizing a #15 blade.  The incision was carried down through the superficial layer of deep cervical fascia and the strap musculature identified in the midline. The strap muscles on the right were transected horizontally utilizing the Ethicon Harmonic scalpel. Minimal bleeding was encountered throughout the case which was controlled with a combination of electrocautery, as well as 2-0 and 3-0 silk ties and the Ethicon Harmonic scalpel.  The inferior aspect of the right thyroid lobe was approached first and dissected free from its surrounding fascial attachments with a Kitner dissector.  The inferior thyroid vasculature was identified and the artery and vein separately clamped cut and ligated with 3-0 silk near the capsule of the gland. The recurrent laryngeal nerve was subsequently identified in Siemens triangle.  Confirmation of identification was obtained was stimulation of the recurrent laryngeal nerve at 0.5 mA  utilizing the Xomed probe.      The right inferior parathyroid gland was identified and it's blood supply preserved during the dissection.  The recurrent nerve was then traced superiorly to its entrance into the larynx via the cricoarytenoid joint as the thyroid lobe was mobilized medially.  The middle thyroid vein was identified and ligated near the capsule of the gland utilizing 3-0 silk.    The superior thyroid vascular pedicle was subsequently identified and the superior thyroid artery and vein separately identified, clamped cut and ligated with 3-0 silk near the capsule of the gland.  The right superior parathyroid gland was then identified and it's blood supply preserved as well.   The isthmus of the thyroid gland was transected subsequently utilizing the Ethicon Harmonic scalpel and the right thyroid lobe was extirpated and submitted for permanent pathologic examination.  The nerve stimulated at the conclusion of the case at 0.5 mA.    The wound was irrigated with copious amounts of normal saline solution.  A # 10 Heriberto drain was placed through a separate stab wound inferiorly in the neck.  It was secured to the skin of the anterior neck utilizing a single stitch of 2-0 silk.  Reapproximation of the incision was accomplished with interrupted 4-0 Vicryl to reapproximate the strap musculature, the platysma as well as subcutaneous tissues.  The epidermis was reapproximated utilizing a running subcuticular stitch of 5-0 Vicryl.  Steri-Strips, Mastisol, Bactroban ointment and a Mepilex were applied to the incision.    The patient tolerated the procedure well without complications and was transported upon extubation to the postanesthesia care unit in stable condition        SPECIMENS:  Specimens       ID Source Type Tests Collected By Collected At Frozen?    A Thyroid Tissue TISSUE PATHOLOGY EXAM   Tiburcio Byers MD 12/18/24 6722 No    Description: THYROIDECTOMY RIGHT LOBE                COMPLICATIONS:  NONE    ESTIMATED BLOOD LOSS:  < 15 cc           Tiburcio Byers MD  12/18/2024

## 2024-12-18 NOTE — H&P
YOB: 1985  Location: [facility]  Location Address: [address] [place] [address], [facility], [address] [place] [address]  Location Phone: [phone]          Chief Complaint   Patient presents with    Thyroid Problem       Patient states she feels like the nodule is more noticeable and wants to get it out         History of Present Illness  Carey Medina is a 39 y.o. female.  Carey Medina is here for follow up of ENT complaints. The patient has had problems with thyroid nodule.  She has had an FNA that resulted as [place] 3 with 1% chance of malignancy.  She feels the right neck has gotten larger and tender and that the nodule is visible.  She wishes for this to be removed.        Medical History        Past Medical History:   Diagnosis Date    Eczema      Female infertility      Hypertension      Irregular periods      Obesity      Oligomenorrhea      Polycystic ovaries      Proteinuria      Type 1 diabetes mellitus      Upper respiratory infection      Visit for gynecologic examination 2015    Vitamin D deficiency              Surgical History         Past Surgical History:   Procedure Laterality Date    INCISION AND DRAINAGE ABSCESS         chin area    LAPAROSCOPY FOR ECTOPIC PREGNANCY         x 2    TOTAL LAPAROSCOPIC HYSTERECTOMY SALPINGO OOPHORECTOMY Bilateral 6/15/2020     Procedure: TOTAL LAPAROSCOPIC HYSTERECTOMY RIGHT SALPINGO OOPHORECTOMY, left salpingectomy;  Surgeon: Shaq Cortez DO;  Location: [facility];  Service: Obstetrics/Gynecology;  Laterality: Bilateral;            Medications Taking          Outpatient Medications Marked as Taking for the 10/17/24 encounter (Office Visit) with Tiburcio Byers MD   Medication Sig Dispense Refill    Blood Glucose Monitoring Suppl (OneTouch Verio Reflect) w/Device kit 1 each 6 (Six) Times a Day. 1 kit 0    Continuous Blood Gluc Sensor (Dexcom G6 Sensor) 1 each As Needed (glucose control). Every 10 days 27 each 3    Continuous  "Blood Gluc Transmit (Dexcom G6 Transmitter) misc 1 each Every 3 (Three) Months. 1 each 3    escitalopram (LEXAPRO) 10 MG tablet Take 1 tablet by mouth Daily.        glucagon (GLUCAGON EMERGENCY) 1 MG injection Inject 1 mg under the skin into the appropriate area as directed 1 (One) Time As Needed for Low Blood Sugar for up to 1 dose. 1 kit 12    glucose blood (OneTouch Verio) test strip 1 each by Other route 6 (Six) Times a Day. Use as instructed 200 each 5    glucose monitor monitoring kit 1 each As Needed (to check bg). 1 each 0    hydroCHLOROthiazide (HYDRODIURIL) 25 MG tablet Take 1 tablet by mouth Daily.        Insulin Aspart (novoLOG) 100 UNIT/ML injection 120 units daily through pump , E10.65 120 mL 3    Insulin Pen Needle (PEN NEEDLES 5/16\") 31G X 8 MM misc          insulin regular (HumuLIN R) 100 UNIT/ML injection Inject 3 Units/hr under the skin into the appropriate area as directed.        Lancets 30G misc Testing 6 times daily  E11.9 540 each 3    lisinopril (PRINIVIL,ZESTRIL) 10 MG tablet          LISINOPRIL-HYDROCHLOROTHIAZIDE PO          omeprazole (priLOSEC) 40 MG capsule TAKE 1 CAPSULE DAILY, 30 MINUTES TO 1 HOUR BEFORE THE LAST LARGE MEAL YOU WOULD EAT BEFORE GOING TO BED. 90 capsule 3                Current Facility-Administered Medications for the 10/17/24 encounter (Office Visit) with Tiburcio Byers MD   Medication Dose Route Frequency Provider Last Rate Last Admin    lidocaine (XYLOCAINE) 1 % injection 5 mL  5 mL Subcutaneous Once Alejo Martins MD                Percocet [oxycodone-acetaminophen]           Family History   Problem Relation Age of Onset    Diabetes Mother      Diabetes Maternal Aunt      Hypertension Maternal Grandmother      Diabetes Maternal Grandmother      Osteoporosis Other           Social History   Social History           Socioeconomic History    Marital status:    Tobacco Use    Smoking status: Never    Smokeless tobacco: Never   Vaping Use    " Vaping status: Never Used   Substance and Sexual Activity    Alcohol use: No    Drug use: No    Sexual activity: Defer            Review of Systems   Constitutional: Negative.    HENT: Negative.               Vitals:     10/17/24 1521   BP: 146/91   Pulse: 100   Temp: 97.8 °F (36.6 °C)         Body mass index is 37.76 kg/m².        Objective  Physical Exam  Vitals reviewed.   Constitutional:       Appearance: She is obese.   HENT:      Head: Normocephalic.      Right Ear: Tympanic membrane, ear canal and external ear normal.      Left Ear: Tympanic membrane, ear canal and external ear normal.      Nose: Nose normal.      Mouth/Throat:      Lips: Pink.      Mouth: Mucous membranes are moist.      Pharynx: Oropharynx is clear.   Neck:      Thyroid: Thyromegaly present.      Comments: Palpable right thyroid nodule  Musculoskeletal:      Cervical back: Full passive range of motion without pain.   Neurological:      Mental Status: She is alert.   Psychiatric:         Behavior: Behavior is cooperative.               Assessment & Plan  Diagnoses and all orders for this visit:     1. Thyroid nodule (Primary)  -     Case Request; Standing  -     Comprehensive Metabolic Panel; Future  -     CBC and Differential; Future  -     ECG 12 Lead; Future  -     XR Chest 2 View; Future  -     Case Request     2. Thyromegaly  -     Case Request; Standing  -     Comprehensive Metabolic Panel; Future  -     CBC and Differential; Future  -     ECG 12 Lead; Future  -     XR Chest 2 View; Future  -     Case Request     Other orders  -     Follow Anesthesia Guidelines / Protocol; Future  -     Provide Patient With Instructions on NPO Status  -     Follow Anesthesia Guidelines / Protocol; Standing  -     Verify NPO Status; Standing  -     SCD (Sequential Compression Device) - To Be Placed on Patient in Pre-Op; Standing  -     Patient to Void Prior to Transfer to OR; Standing  -     Instructions for Nursing; Standing        RIGHT THYROIDECTOMY  (Right)        Orders Placed This Encounter   Procedures    XR Chest 2 View       Standing Status:   Future       Standing Expiration Date:   10/17/2025       Order Specific Question:   Reason for Exam:       Answer:   preop testing       Order Specific Question:   Patient Pregnant       Answer:   Unknown       Order Specific Question:   Release to patient       Answer:   Routine Release [[id number]]    Comprehensive Metabolic Panel       Standing Status:   Future       Standing Expiration Date:   10/17/2025       Order Specific Question:   Release to patient       Answer:   Routine Release [[id number]]    Provide Patient With Instructions on NPO Status    ECG 12 Lead       Standing Status:   Future       Standing Expiration Date:   10/17/2025       Order Specific Question:   Reason for Exam:       Answer:   preop testing       Order Specific Question:   Release to patient       Answer:   Routine Release [[id number]]    CBC and Differential       Standing Status:   Future       Standing Expiration Date:   10/17/2025       Order Specific Question:   Manual Differential       Answer:   No       Order Specific Question:   Release to patient       Answer:   Routine Release [[id number]]      Right THYROIDECTOMY: A right thyroidectomy was recommended. The risks and benefits were explained including but not limited to bleeding, infection, persistent and/or recurrent disease, risks of the general anesthesia, pain, recurrent laryngeal nerve injury with hoarseness and airway loss, parathyroid injury and hypocalcemia. Operative possibilities including hemithyroidectomy, total thyroidectomy and payam dissections were discussed. Possibilities for delayed need for total thyroidectomy pending pathologic diagnosis were also discussed. Alternatives were discussed. No guarantees were made or implied. Questions were asked appropriately answered.

## 2024-12-19 VITALS
SYSTOLIC BLOOD PRESSURE: 112 MMHG | HEART RATE: 65 BPM | BODY MASS INDEX: 38.48 KG/M2 | TEMPERATURE: 98.3 F | WEIGHT: 217.15 LBS | RESPIRATION RATE: 16 BRPM | HEIGHT: 63 IN | OXYGEN SATURATION: 94 % | DIASTOLIC BLOOD PRESSURE: 58 MMHG

## 2024-12-19 DIAGNOSIS — E89.0 S/P PARTIAL THYROIDECTOMY: Primary | ICD-10-CM

## 2024-12-19 LAB
ALBUMIN SERPL-MCNC: 3.3 G/DL (ref 3.5–5.2)
ALBUMIN/GLOB SERPL: 1.1 G/DL
ALP SERPL-CCNC: 84 U/L (ref 39–117)
ALT SERPL W P-5'-P-CCNC: 12 U/L (ref 1–33)
ANION GAP SERPL CALCULATED.3IONS-SCNC: 9 MMOL/L (ref 5–15)
AST SERPL-CCNC: 15 U/L (ref 1–32)
BILIRUB SERPL-MCNC: 0.4 MG/DL (ref 0–1.2)
BUN SERPL-MCNC: 8 MG/DL (ref 6–20)
BUN/CREAT SERPL: 11.8 (ref 7–25)
CALCIUM SPEC-SCNC: 8.3 MG/DL (ref 8.6–10.5)
CHLORIDE SERPL-SCNC: 98 MMOL/L (ref 98–107)
CO2 SERPL-SCNC: 25 MMOL/L (ref 22–29)
CREAT SERPL-MCNC: 0.68 MG/DL (ref 0.57–1)
DEPRECATED RDW RBC AUTO: 44.1 FL (ref 37–54)
EGFRCR SERPLBLD CKD-EPI 2021: 113.8 ML/MIN/1.73
ERYTHROCYTE [DISTWIDTH] IN BLOOD BY AUTOMATED COUNT: 13.4 % (ref 12.3–15.4)
GLOBULIN UR ELPH-MCNC: 2.9 GM/DL
GLUCOSE BLDC GLUCOMTR-MCNC: 223 MG/DL (ref 70–130)
GLUCOSE SERPL-MCNC: 234 MG/DL (ref 65–99)
HCT VFR BLD AUTO: 36.1 % (ref 34–46.6)
HGB BLD-MCNC: 11.8 G/DL (ref 12–15.9)
MCH RBC QN AUTO: 29.1 PG (ref 26.6–33)
MCHC RBC AUTO-ENTMCNC: 32.7 G/DL (ref 31.5–35.7)
MCV RBC AUTO: 88.9 FL (ref 79–97)
PLATELET # BLD AUTO: 322 10*3/MM3 (ref 140–450)
PMV BLD AUTO: 9.7 FL (ref 6–12)
POTASSIUM SERPL-SCNC: 4.3 MMOL/L (ref 3.5–5.2)
PROT SERPL-MCNC: 6.2 G/DL (ref 6–8.5)
RBC # BLD AUTO: 4.06 10*6/MM3 (ref 3.77–5.28)
SODIUM SERPL-SCNC: 132 MMOL/L (ref 136–145)
WBC NRBC COR # BLD AUTO: 7.12 10*3/MM3 (ref 3.4–10.8)

## 2024-12-19 PROCEDURE — 80053 COMPREHEN METABOLIC PANEL: CPT | Performed by: NURSE PRACTITIONER

## 2024-12-19 PROCEDURE — 85027 COMPLETE CBC AUTOMATED: CPT | Performed by: NURSE PRACTITIONER

## 2024-12-19 PROCEDURE — G0378 HOSPITAL OBSERVATION PER HR: HCPCS

## 2024-12-19 PROCEDURE — 99024 POSTOP FOLLOW-UP VISIT: CPT | Performed by: NURSE PRACTITIONER

## 2024-12-19 PROCEDURE — 63710000001 INSULIN LISPRO (HUMAN) PER 5 UNITS: Performed by: NURSE PRACTITIONER

## 2024-12-19 PROCEDURE — 25010000002 ONDANSETRON PER 1 MG: Performed by: OTOLARYNGOLOGY

## 2024-12-19 PROCEDURE — 82948 REAGENT STRIP/BLOOD GLUCOSE: CPT

## 2024-12-19 RX ORDER — ONDANSETRON 2 MG/ML
4 INJECTION INTRAMUSCULAR; INTRAVENOUS EVERY 6 HOURS PRN
Status: DISCONTINUED | OUTPATIENT
Start: 2024-12-19 | End: 2024-12-19 | Stop reason: HOSPADM

## 2024-12-19 RX ORDER — HYDROCODONE BITARTRATE AND ACETAMINOPHEN 5; 325 MG/1; MG/1
1 TABLET ORAL EVERY 6 HOURS PRN
Qty: 6 TABLET | Refills: 0 | Status: SHIPPED | OUTPATIENT
Start: 2024-12-19 | End: 2024-12-25

## 2024-12-19 RX ORDER — PHENOL 1.4 %
600 AEROSOL, SPRAY (ML) MUCOUS MEMBRANE DAILY
Qty: 30 TABLET | Refills: 0 | Status: SHIPPED | OUTPATIENT
Start: 2024-12-19 | End: 2025-01-18

## 2024-12-19 RX ORDER — IBUPROFEN 600 MG/1
1 TABLET ORAL
Status: CANCELLED | OUTPATIENT
Start: 2024-12-19

## 2024-12-19 RX ORDER — NICOTINE POLACRILEX 4 MG
15 LOZENGE BUCCAL
Status: CANCELLED | OUTPATIENT
Start: 2024-12-19

## 2024-12-19 RX ORDER — HYDROCHLOROTHIAZIDE 25 MG/1
12.5 TABLET ORAL NIGHTLY
Status: DISCONTINUED | OUTPATIENT
Start: 2024-12-19 | End: 2024-12-19 | Stop reason: HOSPADM

## 2024-12-19 RX ORDER — DEXTROSE MONOHYDRATE 25 G/50ML
25 INJECTION, SOLUTION INTRAVENOUS
Status: CANCELLED | OUTPATIENT
Start: 2024-12-19

## 2024-12-19 RX ADMIN — HYDROCODONE BITARTRATE AND ACETAMINOPHEN 1 TABLET: 5; 325 TABLET ORAL at 04:31

## 2024-12-19 RX ADMIN — ONDANSETRON 4 MG: 2 INJECTION INTRAMUSCULAR; INTRAVENOUS at 08:44

## 2024-12-19 RX ADMIN — HYDROCODONE BITARTRATE AND ACETAMINOPHEN 1 TABLET: 5; 325 TABLET ORAL at 09:43

## 2024-12-19 RX ADMIN — INSULIN LISPRO 3 UNITS: 100 INJECTION, SOLUTION INTRAVENOUS; SUBCUTANEOUS at 08:05

## 2024-12-19 RX ADMIN — PANTOPRAZOLE SODIUM 40 MG: 40 TABLET, DELAYED RELEASE ORAL at 05:44

## 2024-12-19 NOTE — PLAN OF CARE
Goal Outcome Evaluation:  Plan of Care Reviewed With: patient        Progress: improving  Outcome Evaluation: Patient stable overnight. One episode of vomiting, nausea reduced with zofran. c/o pain, See mar. FARIDEH ATKINS+OX4 up ad reggie voiding. Neck site with mepilex and SIVAKUMAR drain with small out put.  at bedside. call light within reach and safety maintained

## 2024-12-19 NOTE — PLAN OF CARE
Goal Outcome Evaluation:  Plan of Care Reviewed With: patient, spouse        Progress: improving  Outcome Evaluation: A&OX4, VSS. Right side neck incision, Nicko removed per peter VU changed CDI. Up ad reggie, voiding, on room air. ACHS accuchecks with SSI. D/c home this shift. Call light in reach, safety maintained.

## 2024-12-19 NOTE — DISCHARGE SUMMARY
[facility]         DISCHARGE SUMMARY    Patient Name: Carey Medina  : 1985  MRN: [id number]    Date of Admission: 2024  Date of Discharge:  2024  Primary Care Physician: Sidney Winter APRN    Consults       Date and Time Order Name Status Description    2024  2:17 PM Inpatient Hospitalist Consult              Surgical Procedures Since Admission:  Procedure(s):  RIGHT THYROIDECTOMY  Surgeon:  Tiburcio Byers MD  Status:  1 Day Post-Op  -------------------      Presenting Problem:   Thyroid nodule [E04.1]  Thyromegaly [E01.0]  Thyroid mass [E07.9]    Active and Resolved Hospital Problems:  Active Hospital Problems    Diagnosis POA    **Thyroid mass [E07.9] Yes    Diabetes mellitus type 1 [E10.9] Yes    Essential hypertension [I10] Yes    Thyroid nodule [E04.1] Unknown    Thyromegaly [E01.0] Unknown      Resolved Hospital Problems   No resolved problems to display.         Hospital Course     Hospital Course:  Carey Medina is a 39 y.o. female who is post op day 1 right zaynab thyroidectomy   She has had a relatively normal postoperative course  Pain has been relatively well controlled and she is eating/drinking without difficulty       Day of Discharge     Vital Signs:  Temp:  [97.7 °F (36.5 °C)-99.7 °F (37.6 °C)] 98.3 °F (36.8 °C)  Heart Rate:  [] 65  Resp:  [10-18] 16  BP: ()/() 112/58  Flow (L/min) (Oxygen Therapy):  [1-8] 1  Output by Drain (mL) 24 0701 - 24 1900 24 1901 - 24 0700 24 0701 - 24 0906 Range Total   Closed/Suction Drain Anterior Neck Bulb 10 Fr.  40  40     Physical Exam:  Physical Exam  Vitals reviewed.   Constitutional:       Appearance: She is obese.   HENT:      Head: Normocephalic.      Right Ear: External ear normal.      Left Ear: External ear normal.      Nose: Nose normal.      Mouth/Throat:      Lips: Pink.   Neck:     Neurological:      Mental Status: She is alert.            Pertinent  and/or Most Recent Results     LAB RESULTS:      Lab 12/19/24  0323   WBC 7.12   HEMOGLOBIN 11.8*   HEMATOCRIT 36.1   PLATELETS 322   MCV 88.9         Lab 12/19/24  0323 12/18/24  1744   SODIUM 132* 135*   POTASSIUM 4.3 4.0   CHLORIDE 98 100   CO2 25.0 26.0   ANION GAP 9.0 9.0   BUN 8 9   CREATININE 0.68 0.61   EGFR 113.8 116.8   GLUCOSE 234* 171*   CALCIUM 8.3* 8.9         Lab 12/19/24  0323   TOTAL PROTEIN 6.2   ALBUMIN 3.3*   GLOBULIN 2.9   ALT (SGPT) 12   AST (SGOT) 15   BILIRUBIN 0.4   ALK PHOS 84             Brief Urine Lab Results       None          Microbiology Results (last 10 days)       ** No results found for the last 240 hours. **        XR Chest 2 View    Result Date: 12/11/2024  Impression: 1. No acute disease.      This report was signed and finalized on 12/11/2024 3:10 PM by Dr. Latrell Beasley MD.       Tests Pending at Discharge:  Pending Results       Procedure [Order ID] Specimen - Date/Time    Tissue Pathology Exam [[id number]] Collected: 12/18/24 1335    Specimen: Tissue from Thyroid              Discharge Details        Discharge Medications        Changes to Medications        Instructions Start Date   Insulin Aspart 100 UNIT/ML injection  Commonly known as: novoLOG  What changed: additional instructions   120 units daily through pump , E10.65             Continue These Medications        Instructions Start Date   Dexcom G6 Sensor   1 each, Not Applicable, As Needed, Every 10 days      Dexcom G6 Transmitter misc   1 each, Not Applicable, Every 3 Months      escitalopram 20 MG tablet  Commonly known as: LEXAPRO   20 mg, Oral, Daily      glucagon 1 MG injection  Commonly known as: GLUCAGEN   1 mg, Subcutaneous, Once As Needed      hydroCHLOROthiazide 25 MG tablet   25 mg, Oral, Nightly      lisinopril 10 MG tablet  Commonly known as: PRINIVIL,ZESTRIL   Take 1 tablet by mouth Daily Before Supper. Patient takes at 1700               Allergies   Allergen Reactions    Percocet  [Oxycodone-Acetaminophen] Hives and Itching         Discharge Disposition:  Home or Self Care    Diet:  Hospital:  Diet Order   Procedures    Diet: Diabetic; Consistent Carbohydrate; Fluid Consistency: Thin (IDDSI 0)       Discharge Activity:   activity as tolerated   S/s hypocalcemia discussed       Future Appointments   Date Time Provider Department Center   1/28/2025  1:15 PM Kitty Sheikh APRN [facility]           Time spent on Discharge including face to face service:  15 minutes    HORACE Case

## 2024-12-20 LAB
CYTO UR: NORMAL
LAB AP CASE REPORT: NORMAL
Lab: NORMAL
PATH REPORT.FINAL DX SPEC: NORMAL
PATH REPORT.GROSS SPEC: NORMAL

## 2024-12-23 ENCOUNTER — TELEPHONE (OUTPATIENT)
Dept: OTOLARYNGOLOGY | Facility: CLINIC | Age: 39
End: 2024-12-23
Payer: OTHER GOVERNMENT

## 2024-12-23 DIAGNOSIS — E89.0 S/P PARTIAL THYROIDECTOMY: Primary | ICD-10-CM

## 2024-12-23 NOTE — TELEPHONE ENCOUNTER
----- Message from Tiburcio Byers sent at 12/23/2024 10:25 AM CST -----  Please call the patient regarding her benign right follicular nodule and findings consistent with multinodular goiter.  Make sure she has a TSH prior to follow-up.  This increases her chances of needing Synthroid within the next 5 to 7 years.  This is good for her to know so that she can make sure she gets a TSH at least once a year

## 2024-12-27 ENCOUNTER — TELEPHONE (OUTPATIENT)
Dept: OTOLARYNGOLOGY | Facility: CLINIC | Age: 39
End: 2024-12-27

## 2024-12-27 NOTE — TELEPHONE ENCOUNTER
Caller: TIGIST    Relationship to patient: SELF    Best call back number: [phone]    Patient is needing: PT HAS POST OP APPT 1/28/25. PT NEEDS APPT ON A FRIDAY DUE TO WORK CONFLICTS.  PLEASE CALL PT TO R/S

## 2025-01-14 ENCOUNTER — TELEPHONE (OUTPATIENT)
Dept: OTOLARYNGOLOGY | Facility: CLINIC | Age: 40
End: 2025-01-14
Payer: OTHER GOVERNMENT

## 2025-01-14 NOTE — TELEPHONE ENCOUNTER
Caller: TIGIST    Relationship to patient: SELF    Best call back number:     [phone]       Patient is needing: PT NEEDS TO R.S POST OP APPT TO EITHER FIRST THING IN MORNING OR END OF DAY,  DUE TO WORK.    PT WOULD ALSO LIKE FOR HER LAB ORDER TO BE FAXED TO [facility] AT FAX #[phone]

## 2025-01-21 ENCOUNTER — TELEPHONE (OUTPATIENT)
Dept: OTOLARYNGOLOGY | Facility: CLINIC | Age: 40
End: 2025-01-21

## 2025-01-21 NOTE — TELEPHONE ENCOUNTER
Caller: Carey Medina    Relationship: Self    Best call back number: [phone] (home)      What is the best time to reach you: ANYTIME, OK TO LVM      What was the call regarding: PATIENT CALLED TO SEE IF SHE COULD COME IN AT 3 PM OR LATER FOR HER POST OP APPT ON 01/30/2025.  DUE TO WORK CONSTRAINTS AND TRAVEL TIME, SHE IS NOT SURE THAT SHE WILL BE ABLE TO MAKE THE 9 AM APPT.  HUB NEXT AVAIL IS NOT UNTIL MARCH.  PLEASE CALL PT TO ADVISE/SCHEDULE.  THANK YOU.      Is it okay if the provider responds through "[employer]hart: YES

## 2025-01-22 ENCOUNTER — TELEPHONE (OUTPATIENT)
Dept: OTOLARYNGOLOGY | Facility: CLINIC | Age: 40
End: 2025-01-22
Payer: OTHER GOVERNMENT

## 2025-01-30 ENCOUNTER — TELEPHONE (OUTPATIENT)
Dept: OTOLARYNGOLOGY | Facility: CLINIC | Age: 40
End: 2025-01-30

## 2025-01-30 ENCOUNTER — OFFICE VISIT (OUTPATIENT)
Dept: OTOLARYNGOLOGY | Facility: CLINIC | Age: 40
End: 2025-01-30
Payer: OTHER GOVERNMENT

## 2025-01-30 VITALS — DIASTOLIC BLOOD PRESSURE: 100 MMHG | SYSTOLIC BLOOD PRESSURE: 137 MMHG | HEART RATE: 113 BPM | TEMPERATURE: 97.8 F

## 2025-01-30 DIAGNOSIS — R49.0 HOARSENESS: Primary | ICD-10-CM

## 2025-01-30 DIAGNOSIS — E89.0 S/P PARTIAL THYROIDECTOMY: ICD-10-CM

## 2025-01-30 NOTE — TELEPHONE ENCOUNTER
[facility] received a fax that requires your attention. The document has been indexed to the patient’s chart for your review.      Reason for sending:  RCVD AND INDEXED LAB RESULTS.     Documents Description: CHEMISTRY_ [facility] HOSP_ 01-27-25    Name of Sender:  [facility]    Date Indexed: 01/30/25    Notes (if needed):

## 2025-01-30 NOTE — PROGRESS NOTES
YOB: 1985  Location: [facility]  Location Address: [address] [place] [address], [facility] [address], [address] [place] [address]  Location Phone: [phone]    Chief Complaint   Patient presents with    Follow-up     Follow up on Thyroid       History of Present Illness  Carey Medina is a 39 y.o. female.  Carey Medina is here for follow up of ENT complaints. The patient is status post right thyroidectomy 2024.  Pathology demonstrated follicular nodule Patient has had a relatively normal postoperative course except she has issues with her voice. She states she will be speaking and her voice will completely leave her. She has an irritation in her throat. She is not on reflux medication.      Tissue Pathology Exam (2024 13:35)       Past Medical History:   Diagnosis Date    Eczema     Female infertility     Hypertension     Irregular periods     Obesity     Oligomenorrhea     Polycystic ovaries     PONV (postoperative nausea and vomiting)     Proteinuria     Type 1 diabetes mellitus     Upper respiratory infection     Visit for gynecologic examination 2015    Vitamin D deficiency        Past Surgical History:   Procedure Laterality Date    INCISION AND DRAINAGE ABSCESS      chin area    LAPAROSCOPY FOR ECTOPIC PREGNANCY      x 2    THYROIDECTOMY Right 2024    Procedure: RIGHT THYROIDECTOMY;  Surgeon: Tiburcio Byers MD;  Location: [facility];  Service: ENT;  Laterality: Right;    TOTAL LAPAROSCOPIC HYSTERECTOMY SALPINGO OOPHORECTOMY Bilateral 6/15/2020    Procedure: TOTAL LAPAROSCOPIC HYSTERECTOMY RIGHT SALPINGO OOPHORECTOMY, left salpingectomy;  Surgeon: Shaq Cortez DO;  Location: [facility];  Service: Obstetrics/Gynecology;  Laterality: Bilateral;       Outpatient Medications Marked as Taking for the 25 encounter (Office Visit) with Kitty Sheikh APRN   Medication Sig Dispense Refill    Continuous Blood Gluc Sensor (Dexcom G6 Sensor) 1 each As Needed (glucose  control). Every 10 days 27 each 3    Continuous Blood Gluc Transmit (Dexcom G6 Transmitter) misc 1 each Every 3 (Three) Months. 1 each 3    escitalopram (LEXAPRO) 20 MG tablet Take 1 tablet by mouth Daily.      glucagon (GLUCAGON EMERGENCY) 1 MG injection Inject 1 mg under the skin into the appropriate area as directed 1 (One) Time As Needed for Low Blood Sugar for up to 1 dose. 1 kit 12    hydroCHLOROthiazide 25 MG tablet Take 1 tablet by mouth Every Night.      Insulin Aspart (novoLOG) 100 UNIT/ML injection 120 units daily through pump , E10.65 (Patient taking differently: 99 units daily through pump , E10.65) 120 mL 3    lisinopril (PRINIVIL,ZESTRIL) 10 MG tablet Take 1 tablet by mouth Daily Before Supper. Patient takes at 1700         Percocet [oxycodone-acetaminophen]    Family History   Problem Relation Age of Onset    Diabetes Mother     Diabetes Maternal Aunt     Hypertension Maternal Grandmother     Diabetes Maternal Grandmother     Osteoporosis Other        Social History     Socioeconomic History    Marital status:    Tobacco Use    Smoking status: Never    Smokeless tobacco: Never   Vaping Use    Vaping status: Never Used   Substance and Sexual Activity    Alcohol use: No    Drug use: No    Sexual activity: Defer       Review of Systems    Vitals:    01/30/25 1521   BP: 137/100   Pulse: 113   Temp: 97.8 °F (36.6 °C)       There is no height or weight on file to calculate BMI.    Objective     Physical Exam  HENT:      Head: Normocephalic.      Right Ear: Tympanic membrane, ear canal and external ear normal.      Left Ear: Tympanic membrane, ear canal and external ear normal.      Nose: Congestion present.      Mouth/Throat:      Mouth: Mucous membranes are moist.   Neck:      Comments: Thyroidectomy scar well-healed. Suture removed from incision line  Cardiovascular:      Rate and Rhythm: Normal rate.   Pulmonary:      Effort: Pulmonary effort is normal.   Musculoskeletal:         General: Normal  range of motion.      Cervical back: Normal range of motion and neck supple.   Skin:     General: Skin is warm.   Neurological:      Mental Status: She is alert and oriented to person, place, and time.   Psychiatric:         Mood and Affect: Mood normal.         Behavior: Behavior normal.         Assessment & Plan   Problems Addressed this Visit          ENT    Hoarseness - Primary       Endocrine and Metabolic    S/P partial thyroidectomy    Relevant Orders    TSH     Diagnoses         Codes Comments    Hoarseness    -  Primary ICD-10-CM: R49.0  ICD-9-CM: 784.42     S/P partial thyroidectomy     ICD-10-CM: E89.0  ICD-9-CM: 246.8           * Surgery not found *  Orders Placed This Encounter   Procedures    TSH     Standing Status:   Future     Standing Expiration Date:   1/30/2026     Order Specific Question:   Release to patient     Answer:   Routine Release [[id number]]    $ Laryngoscopy     This order was created via procedure documentation     Order Specific Question:   Release to patient     Answer:   Routine Release [[id number]]     Return in about 4 weeks (around 2/27/2025) for with Dr. Byers. Patient to have repeat TSH in 2 months. Signs and symptoms of hypothyroidism discussed and she will call with any issues.        There are no Patient Instructions on file for this visit.

## 2025-01-30 NOTE — PROGRESS NOTES
Flexible laryngoscopy    Date/Time: 1/30/2025 4:18 PM    Performed by: Kitty Sheikh APRN  Authorized by: Kitty Sheikh APRN    Procedure details:     Indications: hoarseness, dysphagia, or aspiration      Medication:  Afrin and Paco-Synephrine 1%    Instrument: flexible fiberoptic laryngoscope      Scope location: right nare    Oropharynx/ Supraglottis:     Posterior pharyngeal wall: inflamed      Epiglottis: normal    Post-procedure details:     Patient tolerance of procedure:  Tolerated well  Comments:      Prominent right sided arytenoid cartilage with prolapse noted possibly impacting true vocal cord   Right vocal cord movement affected     Mild cobblestone appearance noted of posterior op wall     Patient instructed to restart reflux medications twice daily

## 2025-02-25 ENCOUNTER — OFFICE VISIT (OUTPATIENT)
Dept: OTOLARYNGOLOGY | Facility: CLINIC | Age: 40
End: 2025-02-25
Payer: OTHER GOVERNMENT

## 2025-02-25 VITALS — BODY MASS INDEX: 38.45 KG/M2 | HEIGHT: 63 IN | TEMPERATURE: 97.7 F | WEIGHT: 217 LBS

## 2025-02-25 DIAGNOSIS — E04.1 THYROID NODULE: ICD-10-CM

## 2025-02-25 DIAGNOSIS — R49.0 HOARSENESS: ICD-10-CM

## 2025-02-25 DIAGNOSIS — E89.0 S/P PARTIAL THYROIDECTOMY: Primary | ICD-10-CM

## 2025-02-25 RX ORDER — OMEPRAZOLE 40 MG/1
CAPSULE, DELAYED RELEASE ORAL
COMMUNITY
Start: 2025-01-26

## 2025-02-25 NOTE — PROGRESS NOTES
YOB: 1985  Location: [facility]  Location Address: [address] [place] [address], [facility] [address], [address] [place] [address]  Location Phone: [phone]    Chief Complaint   Patient presents with    Thyroid Problem    Hoarse       History of Present Illness  Carey Medina is a 39 y.o. female.  Carey Medina is here for follow up of ENT complaints. The patient is s/p right thyroidectomy 2024  Patient has had a relatively normal postoperative course, but has continued to have some vocal weakness  She states the voice is worse if she talks a lot throughout the day and tends to be worse in the afternoon   She has been taking omeprazole twice daily          Past Medical History:   Diagnosis Date    Eczema     Female infertility     Hypertension     Irregular periods     Obesity     Oligomenorrhea     Polycystic ovaries     PONV (postoperative nausea and vomiting)     Proteinuria     Type 1 diabetes mellitus     Upper respiratory infection     Visit for gynecologic examination 2015    Vitamin D deficiency        Past Surgical History:   Procedure Laterality Date    INCISION AND DRAINAGE ABSCESS      chin area    LAPAROSCOPY FOR ECTOPIC PREGNANCY      x 2    THYROIDECTOMY Right 2024    Procedure: RIGHT THYROIDECTOMY;  Surgeon: Tiburcio Byers MD;  Location: Health system;  Service: ENT;  Laterality: Right;    TOTAL LAPAROSCOPIC HYSTERECTOMY SALPINGO OOPHORECTOMY Bilateral 6/15/2020    Procedure: TOTAL LAPAROSCOPIC HYSTERECTOMY RIGHT SALPINGO OOPHORECTOMY, left salpingectomy;  Surgeon: Shaq Cortez DO;  Location: [facility];  Service: Obstetrics/Gynecology;  Laterality: Bilateral;       Outpatient Medications Marked as Taking for the 25 encounter (Office Visit) with Tiburcio Byers MD   Medication Sig Dispense Refill    Continuous Blood Gluc Sensor (Dexcom G6 Sensor) 1 each As Needed (glucose control). Every 10 days 27 each 3    Continuous Blood Gluc Transmit (Dexcom G6  Transmitter) misc 1 each Every 3 (Three) Months. 1 each 3    escitalopram (LEXAPRO) 20 MG tablet Take 1 tablet by mouth Daily.      hydroCHLOROthiazide 25 MG tablet Take 1 tablet by mouth Every Night.      Insulin Aspart (novoLOG) 100 UNIT/ML injection 120 units daily through pump , E10.65 (Patient taking differently: 99 units daily through pump , E10.65) 120 mL 3    lisinopril (PRINIVIL,ZESTRIL) 10 MG tablet Take 1 tablet by mouth Daily Before Supper. Patient takes at 1700      omeprazole (priLOSEC) 40 MG capsule          Percocet [oxycodone-acetaminophen]    Family History   Problem Relation Age of Onset    Diabetes Mother     Diabetes Maternal Aunt     Hypertension Maternal Grandmother     Diabetes Maternal Grandmother     Osteoporosis Other        Social History     Socioeconomic History    Marital status:    Tobacco Use    Smoking status: Never    Smokeless tobacco: Never   Vaping Use    Vaping status: Never Used   Substance and Sexual Activity    Alcohol use: No    Drug use: No    Sexual activity: Defer       Review of Systems   Constitutional: Negative.    HENT:  Positive for voice change.        Vitals:    02/25/25 1521   Temp: 97.7 °F (36.5 °C)       Body mass index is 38.93 kg/m².    Objective     Physical Exam  Vitals reviewed.   Constitutional:       Appearance: She is obese.   HENT:      Head: Normocephalic.      Right Ear: Tympanic membrane, ear canal and external ear normal.      Left Ear: Tympanic membrane, ear canal and external ear normal.      Nose: Nose normal.      Mouth/Throat:      Lips: Pink.      Mouth: Mucous membranes are moist.      Pharynx: Uvula midline.   Neck:     Neurological:      Mental Status: She is alert.       Dr. Byers has examined and assessed the patient and agrees with current treatment plan        Assessment & Plan   Diagnoses and all orders for this visit:    1. S/P partial thyroidectomy (Primary)    2. Hoarseness  Comments:  improving    3. Thyroid nodule      *  Surgery not found *  No orders of the defined types were placed in this encounter.    Return in about 3 months (around 5/25/2025).     Continue reflux medications and precautions   F/u in 2 months  Can consider referral to [facility] for lack of improvement     There are no Patient Instructions on file for this visit.

## 2025-02-25 NOTE — PROGRESS NOTES
OPERATIVE NOTE:  Carey Medina    DATE OF PROCEDURE: 2/25/2025    PROCEDURE:   Flexible Fiberoptic Laryngoscopy    ANESTHESIA:  None    REASON FOR PROCEDURE:  Procedure was recommended for intermittent fatigue with hoarseness status post right thyroidectomy  Risks, benefits and alternatives were discussed.      DETAILS of OPERATION:  The patient was seated in the exam chair.  A flexible fiberoptic laryngoscopy was performed through the oral cavity.  The scope was introduced into the oral cavity and directed to the level of the glottis, examining the structures of the oropharynx, base of tongue, vallecula, supraglottic larynx, glottic larynx, and hypopharynx.      FINDINGS:  Mucosal surfaces:   The mucosal surfaces demonstrated normal mucosa surfaces with mild inflammation    Base of tongue:  The base of tongue was found to have no mass or lesion.    Epiglottis:  The epiglottis was found to have no mass or lesion.    Aryepiglottic fold:  The AE folds were found to have no mass or lesion.  No significant erythema or edema is noted and no prolapse or collapse of the arytenoid on the right is appreciated.    False Vocal Fold:  The false cords were found to have no mass or lesion.    True Vocal Cord:  The true vocal cords were found to have no mass or lesion. Both true vocal cords adduct and abduct normally    Arytenoid:   The arytenoids were found to have no mass or lesion.    Hypopharynx:  The hypopharynx was found to have no mass or lesion.    The patient tolerated procedure well.

## 2025-06-24 ENCOUNTER — RESULTS FOLLOW-UP (OUTPATIENT)
Dept: OTOLARYNGOLOGY | Facility: CLINIC | Age: 40
End: 2025-06-24
Payer: OTHER GOVERNMENT

## 2025-06-24 NOTE — TELEPHONE ENCOUNTER
Left message for patient     ----- Message from Malinda Ross sent at 6/24/2025 11:57 AM CDT -----  Stay on same dose and recheck in 3 months  ----- Message -----  From: Nia, Cristina Incoming  Sent: 6/24/2025  11:00 AM CDT  To: HORACE Jackman

## 2025-06-25 ENCOUNTER — TELEPHONE (OUTPATIENT)
Dept: OTOLARYNGOLOGY | Facility: CLINIC | Age: 40
End: 2025-06-25
Payer: OTHER GOVERNMENT

## 2025-06-25 ENCOUNTER — OFFICE VISIT (OUTPATIENT)
Dept: OTOLARYNGOLOGY | Facility: CLINIC | Age: 40
End: 2025-06-25
Payer: OTHER GOVERNMENT

## 2025-06-25 VITALS
SYSTOLIC BLOOD PRESSURE: 124 MMHG | DIASTOLIC BLOOD PRESSURE: 94 MMHG | BODY MASS INDEX: 38.45 KG/M2 | WEIGHT: 217 LBS | HEIGHT: 63 IN

## 2025-06-25 DIAGNOSIS — R40.0 DAYTIME SOMNOLENCE: ICD-10-CM

## 2025-06-25 DIAGNOSIS — E89.0 S/P PARTIAL THYROIDECTOMY: Primary | ICD-10-CM

## 2025-06-25 DIAGNOSIS — R53.83 FATIGUE, UNSPECIFIED TYPE: ICD-10-CM

## 2025-06-25 DIAGNOSIS — R06.83 SNORING: ICD-10-CM

## 2025-06-25 PROCEDURE — 99214 OFFICE O/P EST MOD 30 MIN: CPT | Performed by: NURSE PRACTITIONER

## 2025-06-25 RX ORDER — ACYCLOVIR 400 MG/1
TABLET ORAL
COMMUNITY
Start: 2025-04-17

## 2025-06-25 RX ORDER — LEVOTHYROXINE SODIUM 25 MCG
25 TABLET ORAL DAILY
Qty: 30 TABLET | Refills: 3 | Status: SHIPPED | OUTPATIENT
Start: 2025-06-25 | End: 2025-06-26 | Stop reason: SDUPTHER

## 2025-06-25 RX ORDER — HYDROXYZINE PAMOATE 50 MG/1
CAPSULE ORAL
COMMUNITY
Start: 2025-06-19

## 2025-06-25 NOTE — PROGRESS NOTES
YOB: 1985  Location: [facility]  Location Address: [address] [place] [address], [facility] [address], [address] [place] [address]  Location Phone: [phone]    Chief Complaint   Patient presents with    Thyroid Problem       History of Present Illness  Carey Medina is a 39 y.o. female.  Carey Medina is here for follow up of ENT complaints. The patient  is s/p right thyroidectomy 2024  Pathology revealed multinodular goiter   She complains of significant fatigue today   She is not currently taking thyroid replacement therapy  She does report snoring at night and has not had a sleep study at this time          Past Medical History:   Diagnosis Date    Eczema     Female infertility     Hypertension     Irregular periods     Obesity     Oligomenorrhea     Polycystic ovaries     PONV (postoperative nausea and vomiting)     Proteinuria     Type 1 diabetes mellitus     Upper respiratory infection     Visit for gynecologic examination 2015    Vitamin D deficiency        Past Surgical History:   Procedure Laterality Date    INCISION AND DRAINAGE ABSCESS      chin area    LAPAROSCOPY FOR ECTOPIC PREGNANCY      x 2    THYROIDECTOMY Right 2024    Procedure: RIGHT THYROIDECTOMY;  Surgeon: Tiburcio Byers MD;  Location: [facility];  Service: ENT;  Laterality: Right;    TOTAL LAPAROSCOPIC HYSTERECTOMY SALPINGO OOPHORECTOMY Bilateral 6/15/2020    Procedure: TOTAL LAPAROSCOPIC HYSTERECTOMY RIGHT SALPINGO OOPHORECTOMY, left salpingectomy;  Surgeon: Shaq Cortez DO;  Location: [facility];  Service: Obstetrics/Gynecology;  Laterality: Bilateral;       Outpatient Medications Marked as Taking for the 25 encounter (Office Visit) with Kitty Sheikh APRN   Medication Sig Dispense Refill    Continuous Blood Gluc Sensor (Dexcom G6 Sensor) 1 each As Needed (glucose control). Every 10 days 27 each 3    Continuous Blood Gluc Transmit (Dexcom G6 Transmitter) misc 1 each Every 3 (Three) Months. 1 each 3     Continuous Glucose  (Dexcom G7 ) device       escitalopram (LEXAPRO) 20 MG tablet Take 1 tablet by mouth Daily.      glucagon (GLUCAGON EMERGENCY) 1 MG injection Inject 1 mg under the skin into the appropriate area as directed 1 (One) Time As Needed for Low Blood Sugar for up to 1 dose. 1 kit 12    hydroCHLOROthiazide 25 MG tablet Take 1 tablet by mouth Every Night.      hydrOXYzine pamoate (VISTARIL) 50 MG capsule       Insulin Aspart (novoLOG) 100 UNIT/ML injection 120 units daily through pump , E10.65 (Patient taking differently: 99 units daily through pump , E10.65) 120 mL 3    lisinopril (PRINIVIL,ZESTRIL) 10 MG tablet Take 1 tablet by mouth Daily Before Supper. Patient takes at 1700      omeprazole (priLOSEC) 40 MG capsule          Percocet [oxycodone-acetaminophen]    Family History   Problem Relation Age of Onset    Diabetes Mother     Diabetes Maternal Aunt     Hypertension Maternal Grandmother     Diabetes Maternal Grandmother     Osteoporosis Other        Social History     Socioeconomic History    Marital status:    Tobacco Use    Smoking status: Never    Smokeless tobacco: Never   Vaping Use    Vaping status: Never Used   Substance and Sexual Activity    Alcohol use: No    Drug use: No    Sexual activity: Defer       Review of Systems   Constitutional:  Positive for fatigue.   Psychiatric/Behavioral:  Positive for sleep disturbance.        Vitals:    06/25/25 1415   BP: 124/94       Body mass index is 38.93 kg/m².    Objective     Physical Exam  Vitals reviewed.   Constitutional:       Appearance: She is obese.   HENT:      Head: Normocephalic.      Right Ear: Tympanic membrane, ear canal and external ear normal.      Left Ear: Tympanic membrane, ear canal and external ear normal.      Nose: Nose normal.      Mouth/Throat:      Lips: Pink.      Mouth: Mucous membranes are moist.      Pharynx: Uvula midline.   Neck:     Neurological:      Mental Status: She is alert.          Assessment & Plan   Diagnoses and all orders for this visit:    1. S/P partial thyroidectomy (Primary)  -     US Thyroid  -     TSH; Future    2. Fatigue, unspecified type  -     Home Sleep Study; Future    3. Daytime somnolence  -     Home Sleep Study; Future    4. Snoring  -     Home Sleep Study; Future    Other orders  -     Discontinue: Synthroid 25 MCG tablet; Take 1 tablet by mouth Daily for 30 days.  Dispense: 30 tablet; Refill: 3      * Surgery not found *  Orders Placed This Encounter   Procedures    US Thyroid     Reason for Exam::   Thyroid disease     Release to patient:   Routine Release [[id number]]    TSH     Standing Status:   Future     Expected Date:   7/25/2025     Expiration Date:   6/25/2026     Release to patient:   Routine Release [[id number]]    Home Sleep Study     Standing Status:   Future     Expected Date:   6/30/2025     Expiration Date:   6/25/2026     May take own meds:   No     If any contraindications for HST are checked, patient may be recommended for in-lab testing. HST is indicated for patients in whom MONO is  suspected diagnosis.:   Does not apply     Release to patient:   Routine Release [[id number]]     No follow-ups on file.     Start synthroid 25 mcg   Recheck tsh in 4 weeks   Sleep study     There are no Patient Instructions on file for this visit.

## 2025-06-26 DIAGNOSIS — E04.1 THYROID NODULE: ICD-10-CM

## 2025-06-26 DIAGNOSIS — R53.83 FATIGUE, UNSPECIFIED TYPE: ICD-10-CM

## 2025-06-26 DIAGNOSIS — E89.0 S/P PARTIAL THYROIDECTOMY: Primary | ICD-10-CM

## 2025-06-26 RX ORDER — LEVOTHYROXINE SODIUM 25 MCG
25 TABLET ORAL DAILY
Qty: 30 TABLET | Refills: 3 | Status: SHIPPED | OUTPATIENT
Start: 2025-06-26 | End: 2025-07-26

## 2025-06-26 NOTE — TELEPHONE ENCOUNTER
Insurance will not cover brand name without trying generic first.  Patient is ok with sending script to [facility].

## 2025-08-11 ENCOUNTER — LAB (OUTPATIENT)
Dept: LAB | Facility: HOSPITAL | Age: 40
End: 2025-08-11
Payer: OTHER GOVERNMENT

## 2025-08-11 ENCOUNTER — OFFICE VISIT (OUTPATIENT)
Dept: OTOLARYNGOLOGY | Facility: CLINIC | Age: 40
End: 2025-08-11
Payer: OTHER GOVERNMENT

## 2025-08-11 VITALS
BODY MASS INDEX: 38.45 KG/M2 | SYSTOLIC BLOOD PRESSURE: 135 MMHG | DIASTOLIC BLOOD PRESSURE: 89 MMHG | HEIGHT: 63 IN | WEIGHT: 217 LBS

## 2025-08-11 DIAGNOSIS — E89.0 S/P PARTIAL THYROIDECTOMY: Primary | ICD-10-CM

## 2025-08-11 DIAGNOSIS — E89.0 S/P PARTIAL THYROIDECTOMY: ICD-10-CM

## 2025-08-11 LAB — TSH SERPL DL<=0.05 MIU/L-ACNC: 1.94 UIU/ML (ref 0.27–4.2)

## 2025-08-11 PROCEDURE — 84443 ASSAY THYROID STIM HORMONE: CPT

## 2025-08-11 PROCEDURE — 36415 COLL VENOUS BLD VENIPUNCTURE: CPT

## 2025-08-11 PROCEDURE — 99213 OFFICE O/P EST LOW 20 MIN: CPT | Performed by: NURSE PRACTITIONER

## 2025-08-11 RX ORDER — FLUTICASONE PROPIONATE 50 MCG
SPRAY, SUSPENSION (ML) NASAL
COMMUNITY
Start: 2025-07-21

## 2025-08-11 RX ORDER — EPINEPHRINE 0.3 MG/.3ML
INJECTION SUBCUTANEOUS
COMMUNITY
Start: 2025-07-07

## 2025-08-11 RX ORDER — CETIRIZINE HYDROCHLORIDE 10 MG/1
TABLET ORAL
COMMUNITY
Start: 2025-07-22

## 2025-08-11 RX ORDER — ALBUTEROL SULFATE AND BUDESONIDE 90; 80 UG/1; UG/1
AEROSOL, METERED RESPIRATORY (INHALATION)
COMMUNITY
Start: 2025-07-08

## (undated) DEVICE — BANDAGE,GAUZE,CONFORMING,4"X75",STRL,LF: Brand: MEDLINE

## (undated) DEVICE — HARMONIC FOCUS SHEARS 9CM LENGTH + ADAPTIVE TISSUE TECHNOLOGY FOR USE WITH BLUE HAND PIECE ONLY: Brand: HARMONIC FOCUS

## (undated) DEVICE — SUT SILK 2/0 FS BLK 18IN 685G

## (undated) DEVICE — VAGINAL PREP TRAY: Brand: MEDLINE INDUSTRIES, INC.

## (undated) DEVICE — ENDOPATH XCEL BLADELESS TROCARS WITH STABILITY SLEEVES: Brand: ENDOPATH XCEL

## (undated) DEVICE — SYS CLS PORTSITE CT CLOSESURE 5AND10/12

## (undated) DEVICE — Z INACTIVE NO SUPPLIER GELFOAM 12-7MM SPNG

## (undated) DEVICE — GAUZE,SPONGE,2"X2",8PLY,STERILE,LF,2'S: Brand: MEDLINE

## (undated) DEVICE — Z DISCONTINUED PER MEDLINE USE 2425483 TAPE UMB L30IN DIA1/8IN WHT COT NONABSORBABLE W/O NDL FOR

## (undated) DEVICE — PK LAP GYN 60

## (undated) DEVICE — SUT SILK 4/0 SUTUPAK TIES 24IN SA73H

## (undated) DEVICE — BLADE LARYNSCP HNDL MAC 3 DISP CURAVIEW LED

## (undated) DEVICE — SUTURING DEVICE: Brand: ENDO STITCH

## (undated) DEVICE — RESERVOIR,SUCTION,100CC,SILICONE: Brand: MEDLINE

## (undated) DEVICE — SOL IRR NACL 0.9PCT BT 1000ML

## (undated) DEVICE — MINOR CDS: Brand: MEDLINE INDUSTRIES, INC.

## (undated) DEVICE — SUT MNCRYL 3/0 Y936H

## (undated) DEVICE — SUT SILK 3/0 SH CR8 18IN C013D

## (undated) DEVICE — GLV SURG NEOLON 2G PF LF 6.5 STRL

## (undated) DEVICE — METER,URINE,400ML,DRAIN BAG,L/F,LL,SLIDE: Brand: MEDLINE

## (undated) DEVICE — TOWEL,OR,DSP,ST,BLUE,DLX,4/PK,20PK/CS: Brand: MEDLINE

## (undated) DEVICE — PAD,EYE,1-5/8X2 5/8,STERILE,LF,1/PK: Brand: MEDLINE

## (undated) DEVICE — EMG TUBE 8229707 NIM TRIVANTAGE 7.0MM ID: Brand: NIM TRIVANTAGE™

## (undated) DEVICE — RETR STAY HK ELAS SHRP 5MM

## (undated) DEVICE — SHEET,DRAPE,53X77,STERILE: Brand: MEDLINE

## (undated) DEVICE — ELECTRD BLD EZ CLN MOD XLNG 2.75IN

## (undated) DEVICE — SOLUTION IV IRRIG POUR BRL 0.9% SODIUM CHL 2F7124

## (undated) DEVICE — CHLORAPREP 26ML ORANGE

## (undated) DEVICE — T-MAX DISPOSABLE FACE MASK 8 PER BOX

## (undated) DEVICE — SUTURE CHROMIC GUT SZ 3-0 L27IN ABSRB BRN L19MM PS-2 3/8 1638H

## (undated) DEVICE — SUT VIC 3/0 SH 27IN J416H

## (undated) DEVICE — CYSTO/BLADDER IRRIGATION SET, REGULATING CLAMP

## (undated) DEVICE — INTEGRATED CASSETTE TUBING, DO NOT USE IF PACKAGE IS DAMAGED: Brand: CROSSFLOW

## (undated) DEVICE — ENSEAL LAPAROSCOPIC TISSUE SEALER G2 ARTICULATING  CURVED JAW FOR USE WITH G2 GENERATOR 5MM DIAMETER 35CM SHAFT LENGTH: Brand: ENSEAL

## (undated) DEVICE — DRAPE,SHOULDER,BEACH CHAIR,STERILE: Brand: MEDLINE

## (undated) DEVICE — ADULT SPO2 SENSOR,REMANUFACTURED,REPROCESSED DEVICE FOR SINGLE USE; REPROCESSED BY COVIDIEN LLC: Brand: NELLCOR

## (undated) DEVICE — VISUALIZATION SYSTEM: Brand: CLEARIFY

## (undated) DEVICE — [AGGRESSIVE PLUS CUTTER, ARTHROSCOPIC SHAVER BLADE,  DO NOT RESTERILIZE,  DO NOT USE IF PACKAGE IS DAMAGED,  KEEP DRY,  KEEP AWAY FROM SUNLIGHT]: Brand: FORMULA

## (undated) DEVICE — CONMED GOLDLINE ELECTROSURGICAL HANDPIECE, HAND CONTROLLED WITH BLADE ELECTRODE, BUTTON SWITCH, SAFETY HOLSTER AND 10 FT (3 M) CABLE: Brand: CONMED GOLDLINE

## (undated) DEVICE — GLV SURG SIGNATURE ESSENTIAL PF LTX SZ7.5

## (undated) DEVICE — STERILE POLYISOPRENE POWDER-FREE SURGICAL GLOVES WITH EMOLLIENT COATING: Brand: PROTEXIS

## (undated) DEVICE — SOLUTION IRRIG 3000ML 0.9% SOD CHL USP UROMATIC PLAS CONT

## (undated) DEVICE — GLOVE SURG SZ 8 CRM LTX FREE POLYISOPRENE POLYMER BEAD ANTI

## (undated) DEVICE — ADHS LIQ MASTISOL 2/3ML

## (undated) DEVICE — 3M™ IOBAN™ 2 ANTIMICROBIAL INCISE DRAPE 6650EZ: Brand: IOBAN™ 2

## (undated) DEVICE — GLV SURG BIOGEL M LTX PF 7 1/2

## (undated) DEVICE — SUT MNCRYL 4/0 P3 18IN UD MCP494G

## (undated) DEVICE — DRSNG BRDR MEPILEX FLX/LITE FM 4X5CM

## (undated) DEVICE — BNDG ADHS CURAD FLX/FABRC 2X4IN STRL LF

## (undated) DEVICE — NDL HYPO SFTY GLD 22G 1 1/2IN

## (undated) DEVICE — SOL IRRIG NACL 1000ML

## (undated) DEVICE — NEEDLE 14GA 3IN DISP EAR SUCT W/ BYPS

## (undated) DEVICE — PAD,ARMBOARD,CONV,FOAM,2X8X20",12PR/CS: Brand: MEDLINE

## (undated) DEVICE — POSITIONER,HEAD,MULTIRING,36CS: Brand: MEDLINE

## (undated) DEVICE — SUTURE ETHLN SZ 3-0 L18IN NONABSORBABLE BLK FS-1 L24MM 3/8 663H

## (undated) DEVICE — GELFOAM SZ 100 SPNG

## (undated) DEVICE — SYRINGE MED 50ML LUERLOCK TIP

## (undated) DEVICE — DRP SURG U/BUTT W/PCH BACK STRL

## (undated) DEVICE — APPL HEMOS FOR DELIVERY FLOSEAL

## (undated) DEVICE — STRIP CLS WND CURAD MEDI/STRIP HYPOALLERG 0.25X4IN PK/10

## (undated) DEVICE — SPONGE,DISSECTOR,K,XRAY,9/16"X1/4",STRL: Brand: MEDLINE

## (undated) DEVICE — CATH FOL LUBRISIL IC 3WY 18F 5CC

## (undated) DEVICE — GOWN,PREVENTION PLUS,XLNG/XXLARGE,STRL: Brand: MEDLINE

## (undated) DEVICE — PROBE 8225101E PRASS STD PROT HANDLE

## (undated) DEVICE — Device

## (undated) DEVICE — SUT SILK 3/0 SUTUPAK TIES 24IN SA74H

## (undated) DEVICE — SUT MNCRYL 5/0 P3 18IN UD MCP493G

## (undated) DEVICE — TUBE ET 7MM NSL ORAL BASIC CUF INTMED MURPHY EYE RADPQ MRK

## (undated) DEVICE — GOWN,AURORA,NOREINF,RAGLAN,XL,STERILE: Brand: MEDLINE

## (undated) DEVICE — PK ENT HD AND NK 30

## (undated) DEVICE — BNDG ADHS CURAD FLX/FABRC 1X3IN STRL LF

## (undated) DEVICE — SUT MERSILENE 5MM ETHD9212

## (undated) DEVICE — SUPER TURBOVAC 90 WITH INTEGRATED FINGER SWITCHES IFS: Brand: COBLATION

## (undated) DEVICE — VCARE MEDIUM, UTERINE MANIPULATOR, VAGINAL-CERVICAL-AHLUWALIA'S-RETRACTOR-ELEVATOR: Brand: VCARE

## (undated) DEVICE — DRESSING TRNSPAR W5XL4.5IN FLM SHT SEMIPERMEABLE WIND

## (undated) DEVICE — [AGGRESSIVE 6-FLUTE BARREL BUR, ARTHROSCOPIC SHAVER BLADE,  DO NOT RESTERILIZE,  DO NOT USE IF PACKAGE IS DAMAGED,  KEEP DRY,  KEEP AWAY FROM SUNLIGHT]: Brand: FORMULA

## (undated) DEVICE — SHOULDER STABILIZATION KIT,                                    DISPOSABLE 12 PER BOX

## (undated) DEVICE — GLV SURG TRIUMPH LT PF LTX 7.5 STRL

## (undated) DEVICE — MONOPOLAR METZENBAUM SCISSOR TIP, DISPOSABLE: Brand: MONOPOLAR METZENBAUM SCISSOR TIP, DISPOSABLE

## (undated) DEVICE — YANKAUER,TAPERED BULBOUS TIP,W/O VENT: Brand: MEDLINE

## (undated) DEVICE — SKIN AFFIX SURG ADHESIVE 72/CS 0.55ML: Brand: MEDLINE

## (undated) DEVICE — SHOULDER CDS

## (undated) DEVICE — SUTURE MCRYL SZ 3 0 L18IN ABSRB UD PS 2 3 8 CIR REV CUT NDL MCP497G

## (undated) DEVICE — KIT COLL REG FLOCKED SWAB VIABILITY FLEXIBILITY EASE OF USE

## (undated) DEVICE — TRAP FLD MINIVAC MEGADYNE 100ML

## (undated) DEVICE — 3M™ STERI-STRIP™ REINFORCED ADHESIVE SKIN CLOSURES, R1547, 1/2 IN X 4 IN (12 MM X 100 MM), 6 STRIPS/ENVELOPE: Brand: 3M™ STERI-STRIP™

## (undated) DEVICE — CORE TRUMPET FOR SINGLE SOLUTION BAG: Brand: CORE DYNAMICS

## (undated) DEVICE — BANDAGE GZ W2XL75IN ST RAYON POLY CNFRM STRTCH LTWT

## (undated) DEVICE — 4-PORT MANIFOLD: Brand: NEPTUNE 2